# Patient Record
Sex: MALE | Race: BLACK OR AFRICAN AMERICAN | NOT HISPANIC OR LATINO | Employment: FULL TIME | URBAN - METROPOLITAN AREA
[De-identification: names, ages, dates, MRNs, and addresses within clinical notes are randomized per-mention and may not be internally consistent; named-entity substitution may affect disease eponyms.]

---

## 2022-09-20 ENCOUNTER — ANESTHESIA EVENT (OUTPATIENT)
Dept: SURGERY | Facility: HOSPITAL | Age: 32
DRG: 025 | End: 2022-09-20
Payer: COMMERCIAL

## 2022-09-20 ENCOUNTER — ANESTHESIA (OUTPATIENT)
Dept: SURGERY | Facility: HOSPITAL | Age: 32
DRG: 025 | End: 2022-09-20
Payer: COMMERCIAL

## 2022-09-20 ENCOUNTER — HOSPITAL ENCOUNTER (INPATIENT)
Facility: HOSPITAL | Age: 32
LOS: 4 days | Discharge: HOME | DRG: 025 | End: 2022-09-24
Attending: EMERGENCY MEDICINE | Admitting: INTERNAL MEDICINE
Payer: COMMERCIAL

## 2022-09-20 DIAGNOSIS — I61.9 BRAIN BLEED: ICD-10-CM

## 2022-09-20 DIAGNOSIS — G93.89 BRAIN MASS: Primary | ICD-10-CM

## 2022-09-20 DIAGNOSIS — I61.9 INTRAPARENCHYMAL HEMORRHAGE OF BRAIN: ICD-10-CM

## 2022-09-20 LAB
ALBUMIN SERPL-MCNC: 4.4 GM/DL (ref 3.5–5)
ALBUMIN/GLOB SERPL: 1.3 RATIO (ref 1.1–2)
ALP SERPL-CCNC: 49 UNIT/L (ref 40–150)
ALT SERPL-CCNC: 11 UNIT/L (ref 0–55)
APPEARANCE UR: CLEAR
APTT PPP: 29.9 SECONDS (ref 23.2–33.7)
AST SERPL-CCNC: 16 UNIT/L (ref 5–34)
BACTERIA #/AREA URNS AUTO: NORMAL /HPF
BASOPHILS # BLD AUTO: 0.03 X10(3)/MCL (ref 0–0.2)
BASOPHILS NFR BLD AUTO: 0.2 %
BILIRUB UR QL STRIP.AUTO: NEGATIVE MG/DL
BILIRUBIN DIRECT+TOT PNL SERPL-MCNC: 1 MG/DL
BUN SERPL-MCNC: 12.5 MG/DL (ref 8.9–20.6)
CALCIUM SERPL-MCNC: 10.4 MG/DL (ref 8.4–10.2)
CHLORIDE SERPL-SCNC: 107 MMOL/L (ref 98–107)
CO2 SERPL-SCNC: 23 MMOL/L (ref 22–29)
COLOR UR AUTO: YELLOW
CREAT SERPL-MCNC: 0.78 MG/DL (ref 0.73–1.18)
EOSINOPHIL # BLD AUTO: 0 X10(3)/MCL (ref 0–0.9)
EOSINOPHIL NFR BLD AUTO: 0 %
ERYTHROCYTE [DISTWIDTH] IN BLOOD BY AUTOMATED COUNT: 12.9 % (ref 11.5–17)
GFR SERPLBLD CREATININE-BSD FMLA CKD-EPI: >60 MLS/MIN/1.73/M2
GLOBULIN SER-MCNC: 3.4 GM/DL (ref 2.4–3.5)
GLUCOSE SERPL-MCNC: 142 MG/DL (ref 74–100)
GLUCOSE UR QL STRIP.AUTO: NEGATIVE MG/DL
GROUP & RH: NORMAL
HCT VFR BLD AUTO: 42.8 % (ref 42–52)
HGB BLD-MCNC: 13.8 GM/DL (ref 14–18)
IMM GRANULOCYTES # BLD AUTO: 0.07 X10(3)/MCL (ref 0–0.04)
IMM GRANULOCYTES NFR BLD AUTO: 0.5 %
INDIRECT COOMBS GEL: NORMAL
INR BLD: 1.09 (ref 0–1.3)
KETONES UR QL STRIP.AUTO: NEGATIVE MG/DL
LEUKOCYTE ESTERASE UR QL STRIP.AUTO: NEGATIVE UNIT/L
LYMPHOCYTES # BLD AUTO: 0.5 X10(3)/MCL (ref 0.6–4.6)
LYMPHOCYTES NFR BLD AUTO: 3.2 %
MCH RBC QN AUTO: 27.5 PG (ref 27–31)
MCHC RBC AUTO-ENTMCNC: 32.2 MG/DL (ref 33–36)
MCV RBC AUTO: 85.3 FL (ref 80–94)
MONOCYTES # BLD AUTO: 0.25 X10(3)/MCL (ref 0.1–1.3)
MONOCYTES NFR BLD AUTO: 1.6 %
NEUTROPHILS # BLD AUTO: 14.7 X10(3)/MCL (ref 2.1–9.2)
NEUTROPHILS NFR BLD AUTO: 94.5 %
NITRITE UR QL STRIP.AUTO: NEGATIVE
NRBC BLD AUTO-RTO: 0 %
PCO2 BLDA: 35 MMHG
PCO2 BLDA: 35 MMHG
PH SMN: 7.5 [PH] (ref 7.35–7.45)
PH SMN: 7.51 [PH] (ref 7.35–7.45)
PH UR STRIP.AUTO: 7.5 [PH]
PLATELET # BLD AUTO: 409 X10(3)/MCL (ref 130–400)
PMV BLD AUTO: 10.8 FL (ref 7.4–10.4)
PO2 BLDA: 185 MMHG
PO2 BLDA: 205 MMHG
POC BASE DEFICIT: 4.2 MMOL/L
POC BASE DEFICIT: 4.9 MMOL/L
POC HCO3: 27.3 MMOL/L
POC HCO3: 27.9 MMOL/L
POC IONIZED CALCIUM: 1.15 MMOL/L
POC IONIZED CALCIUM: 1.16 MMOL/L
POC SATURATED O2: 100 %
POC SATURATED O2: 100 %
POC TEMPERATURE: 37 C
POC TEMPERATURE: 37 C
POTASSIUM BLD-SCNC: 3.4 MMOL/L
POTASSIUM BLD-SCNC: 3.5 MMOL/L
POTASSIUM SERPL-SCNC: 4.3 MMOL/L (ref 3.5–5.1)
PROT SERPL-MCNC: 7.8 GM/DL (ref 6.4–8.3)
PROT UR QL STRIP.AUTO: NEGATIVE MG/DL
PROTHROMBIN TIME: 14 SECONDS (ref 12.5–14.5)
RBC # BLD AUTO: 5.02 X10(6)/MCL (ref 4.7–6.1)
RBC #/AREA URNS AUTO: <5 /HPF
RBC UR QL AUTO: NEGATIVE UNIT/L
SODIUM BLD-SCNC: 140 MMOL/L
SODIUM BLD-SCNC: 140 MMOL/L
SODIUM SERPL-SCNC: 142 MMOL/L (ref 136–145)
SP GR UR STRIP.AUTO: 1.02 (ref 1–1.03)
SPECIMEN SOURCE: ABNORMAL
SPECIMEN SOURCE: ABNORMAL
SQUAMOUS #/AREA URNS AUTO: <5 /HPF
UROBILINOGEN UR STRIP-ACNC: 1 MG/DL
WBC # SPEC AUTO: 15.5 X10(3)/MCL (ref 4.5–11.5)
WBC #/AREA URNS AUTO: <5 /HPF

## 2022-09-20 PROCEDURE — 37000009 HC ANESTHESIA EA ADD 15 MINS: Performed by: NEUROLOGICAL SURGERY

## 2022-09-20 PROCEDURE — 27000221 HC OXYGEN, UP TO 24 HOURS

## 2022-09-20 PROCEDURE — 96375 TX/PRO/DX INJ NEW DRUG ADDON: CPT

## 2022-09-20 PROCEDURE — 36000713 HC OR TIME LEV V EA ADD 15 MIN: Performed by: NEUROLOGICAL SURGERY

## 2022-09-20 PROCEDURE — 96374 THER/PROPH/DIAG INJ IV PUSH: CPT

## 2022-09-20 PROCEDURE — 25000003 PHARM REV CODE 250: Performed by: NURSE PRACTITIONER

## 2022-09-20 PROCEDURE — 61510 PR EXCIS SUPRATENT BRAIN TUMOR: ICD-10-PCS | Mod: ,,, | Performed by: NEUROLOGICAL SURGERY

## 2022-09-20 PROCEDURE — 25000003 PHARM REV CODE 250

## 2022-09-20 PROCEDURE — 85025 COMPLETE CBC W/AUTO DIFF WBC: CPT | Performed by: EMERGENCY MEDICINE

## 2022-09-20 PROCEDURE — 61510 CRNEC TREPH EXC BRN TUM STTL: CPT | Mod: AS,,, | Performed by: NURSE PRACTITIONER

## 2022-09-20 PROCEDURE — 36415 COLL VENOUS BLD VENIPUNCTURE: CPT | Performed by: NEUROLOGICAL SURGERY

## 2022-09-20 PROCEDURE — 81287 MGMT GENE PRMTR MTHYLTN ALYS: CPT

## 2022-09-20 PROCEDURE — 86901 BLOOD TYPING SEROLOGIC RH(D): CPT | Performed by: NEUROLOGICAL SURGERY

## 2022-09-20 PROCEDURE — 63600175 PHARM REV CODE 636 W HCPCS: Performed by: NEUROLOGICAL SURGERY

## 2022-09-20 PROCEDURE — C9248 INJ, CLEVIDIPINE BUTYRATE: HCPCS | Mod: JG

## 2022-09-20 PROCEDURE — 63600175 PHARM REV CODE 636 W HCPCS: Mod: JG

## 2022-09-20 PROCEDURE — 94761 N-INVAS EAR/PLS OXIMETRY MLT: CPT

## 2022-09-20 PROCEDURE — 82803 BLOOD GASES ANY COMBINATION: CPT

## 2022-09-20 PROCEDURE — 81001 URINALYSIS AUTO W/SCOPE: CPT | Performed by: EMERGENCY MEDICINE

## 2022-09-20 PROCEDURE — 37000008 HC ANESTHESIA 1ST 15 MINUTES: Performed by: NEUROLOGICAL SURGERY

## 2022-09-20 PROCEDURE — 80053 COMPREHEN METABOLIC PANEL: CPT | Performed by: EMERGENCY MEDICINE

## 2022-09-20 PROCEDURE — 36415 COLL VENOUS BLD VENIPUNCTURE: CPT | Performed by: EMERGENCY MEDICINE

## 2022-09-20 PROCEDURE — 25000003 PHARM REV CODE 250: Performed by: INTERNAL MEDICINE

## 2022-09-20 PROCEDURE — 85730 THROMBOPLASTIN TIME PARTIAL: CPT | Performed by: EMERGENCY MEDICINE

## 2022-09-20 PROCEDURE — 27201423 OPTIME MED/SURG SUP & DEVICES STERILE SUPPLY: Performed by: NEUROLOGICAL SURGERY

## 2022-09-20 PROCEDURE — 27200966 HC CLOSED SUCTION SYSTEM

## 2022-09-20 PROCEDURE — 63600175 PHARM REV CODE 636 W HCPCS: Performed by: EMERGENCY MEDICINE

## 2022-09-20 PROCEDURE — 69990 PR MICROSURG TECHNIQUES,REQ OPER MICROSCOPE: ICD-10-PCS | Mod: 59,,, | Performed by: NEUROLOGICAL SURGERY

## 2022-09-20 PROCEDURE — 85610 PROTHROMBIN TIME: CPT | Performed by: EMERGENCY MEDICINE

## 2022-09-20 PROCEDURE — 37799 UNLISTED PX VASCULAR SURGERY: CPT

## 2022-09-20 PROCEDURE — 99900031 HC PATIENT EDUCATION (STAT)

## 2022-09-20 PROCEDURE — 81455 SO/HL 51/>GSAP DNA/DNA&RNA: CPT

## 2022-09-20 PROCEDURE — 99900035 HC TECH TIME PER 15 MIN (STAT)

## 2022-09-20 PROCEDURE — C1729 CATH, DRAINAGE: HCPCS | Performed by: NEUROLOGICAL SURGERY

## 2022-09-20 PROCEDURE — 61510 PR EXCIS SUPRATENT BRAIN TUMOR: ICD-10-PCS | Mod: AS,,, | Performed by: NURSE PRACTITIONER

## 2022-09-20 PROCEDURE — 36000712 HC OR TIME LEV V 1ST 15 MIN: Performed by: NEUROLOGICAL SURGERY

## 2022-09-20 PROCEDURE — 25000003 PHARM REV CODE 250: Performed by: NEUROLOGICAL SURGERY

## 2022-09-20 PROCEDURE — 88381 MICRODISSECTION MANUAL: CPT

## 2022-09-20 PROCEDURE — 61781 PR STEREOTACTIC COMP ASSIST PROC,CRANIAL,INTRADURAL: ICD-10-PCS | Mod: ,,, | Performed by: NEUROLOGICAL SURGERY

## 2022-09-20 PROCEDURE — 63600175 PHARM REV CODE 636 W HCPCS: Performed by: STUDENT IN AN ORGANIZED HEALTH CARE EDUCATION/TRAINING PROGRAM

## 2022-09-20 PROCEDURE — 20000000 HC ICU ROOM

## 2022-09-20 PROCEDURE — 61781 SCAN PROC CRANIAL INTRA: CPT | Mod: ,,, | Performed by: NEUROLOGICAL SURGERY

## 2022-09-20 PROCEDURE — 63600175 PHARM REV CODE 636 W HCPCS: Performed by: INTERNAL MEDICINE

## 2022-09-20 PROCEDURE — 96365 THER/PROPH/DIAG IV INF INIT: CPT

## 2022-09-20 PROCEDURE — 25000003 PHARM REV CODE 250: Performed by: STUDENT IN AN ORGANIZED HEALTH CARE EDUCATION/TRAINING PROGRAM

## 2022-09-20 PROCEDURE — C1713 ANCHOR/SCREW BN/BN,TIS/BN: HCPCS | Performed by: NEUROLOGICAL SURGERY

## 2022-09-20 PROCEDURE — 94002 VENT MGMT INPAT INIT DAY: CPT

## 2022-09-20 PROCEDURE — 99233 PR SUBSEQUENT HOSPITAL CARE,LEVL III: ICD-10-PCS | Mod: FS,57,, | Performed by: NEUROLOGICAL SURGERY

## 2022-09-20 PROCEDURE — 61510 CRNEC TREPH EXC BRN TUM STTL: CPT | Mod: ,,, | Performed by: NEUROLOGICAL SURGERY

## 2022-09-20 PROCEDURE — 99233 SBSQ HOSP IP/OBS HIGH 50: CPT | Mod: FS,57,, | Performed by: NEUROLOGICAL SURGERY

## 2022-09-20 PROCEDURE — 69990 MICROSURGERY ADD-ON: CPT | Mod: 59,,, | Performed by: NEUROLOGICAL SURGERY

## 2022-09-20 PROCEDURE — 63600175 PHARM REV CODE 636 W HCPCS: Performed by: NURSE PRACTITIONER

## 2022-09-20 PROCEDURE — 99291 CRITICAL CARE FIRST HOUR: CPT | Mod: 25

## 2022-09-20 DEVICE — COVER UN3 BURRHOLE 14MM TAB: Type: IMPLANTABLE DEVICE | Site: SCALP | Status: FUNCTIONAL

## 2022-09-20 DEVICE — SCREW SD 1.5X4MM TI CROSS PIN: Type: IMPLANTABLE DEVICE | Site: SCALP | Status: FUNCTIONAL

## 2022-09-20 RX ORDER — FUROSEMIDE 10 MG/ML
40 INJECTION INTRAMUSCULAR; INTRAVENOUS
Status: COMPLETED | OUTPATIENT
Start: 2022-09-20 | End: 2022-09-20

## 2022-09-20 RX ORDER — SUCCINYLCHOLINE CHLORIDE 20 MG/ML
INJECTION INTRAMUSCULAR; INTRAVENOUS
Status: DISCONTINUED | OUTPATIENT
Start: 2022-09-20 | End: 2022-09-20

## 2022-09-20 RX ORDER — ATROPINE SULFATE 0.1 MG/ML
INJECTION INTRAVENOUS
Status: DISPENSED
Start: 2022-09-20 | End: 2022-09-21

## 2022-09-20 RX ORDER — HYDROCODONE BITARTRATE AND ACETAMINOPHEN 5; 325 MG/1; MG/1
1 TABLET ORAL EVERY 4 HOURS PRN
Status: DISCONTINUED | OUTPATIENT
Start: 2022-09-20 | End: 2022-09-24 | Stop reason: HOSPADM

## 2022-09-20 RX ORDER — HYDRALAZINE HYDROCHLORIDE 20 MG/ML
10 INJECTION INTRAMUSCULAR; INTRAVENOUS EVERY 4 HOURS PRN
Status: DISCONTINUED | OUTPATIENT
Start: 2022-09-20 | End: 2022-09-20

## 2022-09-20 RX ORDER — MIDAZOLAM HYDROCHLORIDE 1 MG/ML
INJECTION INTRAMUSCULAR; INTRAVENOUS
Status: DISCONTINUED
Start: 2022-09-20 | End: 2022-09-20 | Stop reason: WASHOUT

## 2022-09-20 RX ORDER — LEVETIRACETAM 500 MG/5ML
INJECTION, SOLUTION, CONCENTRATE INTRAVENOUS
Status: DISCONTINUED | OUTPATIENT
Start: 2022-09-20 | End: 2022-09-20

## 2022-09-20 RX ORDER — PROPOFOL 10 MG/ML
VIAL (ML) INTRAVENOUS
Status: DISCONTINUED | OUTPATIENT
Start: 2022-09-20 | End: 2022-09-20

## 2022-09-20 RX ORDER — HYDRALAZINE HYDROCHLORIDE 20 MG/ML
10 INJECTION INTRAMUSCULAR; INTRAVENOUS EVERY 6 HOURS PRN
Status: DISCONTINUED | OUTPATIENT
Start: 2022-09-20 | End: 2022-09-24 | Stop reason: HOSPADM

## 2022-09-20 RX ORDER — FENTANYL CITRATE 50 UG/ML
INJECTION, SOLUTION INTRAMUSCULAR; INTRAVENOUS
Status: DISCONTINUED | OUTPATIENT
Start: 2022-09-20 | End: 2022-09-20

## 2022-09-20 RX ORDER — DEXMEDETOMIDINE HYDROCHLORIDE 4 UG/ML
0-1.4 INJECTION, SOLUTION INTRAVENOUS CONTINUOUS
Status: DISCONTINUED | OUTPATIENT
Start: 2022-09-20 | End: 2022-09-24 | Stop reason: HOSPADM

## 2022-09-20 RX ORDER — GLYCOPYRROLATE 0.2 MG/ML
INJECTION INTRAMUSCULAR; INTRAVENOUS
Status: DISCONTINUED | OUTPATIENT
Start: 2022-09-20 | End: 2022-09-20

## 2022-09-20 RX ORDER — PAPAVERINE HYDROCHLORIDE 30 MG/ML
INJECTION INTRAMUSCULAR; INTRAVENOUS
Status: DISCONTINUED | OUTPATIENT
Start: 2022-09-20 | End: 2022-09-20 | Stop reason: HOSPADM

## 2022-09-20 RX ORDER — BACITRACIN ZINC 500 [USP'U]/G
OINTMENT TOPICAL
Status: DISCONTINUED | OUTPATIENT
Start: 2022-09-20 | End: 2022-09-20 | Stop reason: HOSPADM

## 2022-09-20 RX ORDER — ROCURONIUM BROMIDE 10 MG/ML
INJECTION, SOLUTION INTRAVENOUS
Status: DISCONTINUED | OUTPATIENT
Start: 2022-09-20 | End: 2022-09-20

## 2022-09-20 RX ORDER — DEXAMETHASONE SODIUM PHOSPHATE 4 MG/ML
INJECTION, SOLUTION INTRA-ARTICULAR; INTRALESIONAL; INTRAMUSCULAR; INTRAVENOUS; SOFT TISSUE
Status: DISCONTINUED | OUTPATIENT
Start: 2022-09-20 | End: 2022-09-20

## 2022-09-20 RX ORDER — MORPHINE SULFATE 4 MG/ML
INJECTION, SOLUTION INTRAMUSCULAR; INTRAVENOUS
Status: DISCONTINUED
Start: 2022-09-20 | End: 2022-09-20 | Stop reason: WASHOUT

## 2022-09-20 RX ORDER — SODIUM CHLORIDE 9 MG/ML
INJECTION, SOLUTION INTRAVENOUS CONTINUOUS
Status: DISCONTINUED | OUTPATIENT
Start: 2022-09-20 | End: 2022-09-24 | Stop reason: HOSPADM

## 2022-09-20 RX ORDER — ONDANSETRON 2 MG/ML
4 INJECTION INTRAMUSCULAR; INTRAVENOUS EVERY 4 HOURS PRN
Status: DISCONTINUED | OUTPATIENT
Start: 2022-09-20 | End: 2022-09-24 | Stop reason: HOSPADM

## 2022-09-20 RX ORDER — HYDRALAZINE HYDROCHLORIDE 20 MG/ML
10 INJECTION INTRAMUSCULAR; INTRAVENOUS
Status: COMPLETED | OUTPATIENT
Start: 2022-09-20 | End: 2022-09-20

## 2022-09-20 RX ORDER — MANNITOL 250 MG/ML
50 INJECTION, SOLUTION INTRAVENOUS
Status: COMPLETED | OUTPATIENT
Start: 2022-09-20 | End: 2022-09-20

## 2022-09-20 RX ORDER — MANNITOL 250 MG/ML
INJECTION, SOLUTION INTRAVENOUS
Status: DISPENSED
Start: 2022-09-20 | End: 2022-09-20

## 2022-09-20 RX ORDER — CEFAZOLIN SODIUM 1 G/3ML
INJECTION, POWDER, FOR SOLUTION INTRAMUSCULAR; INTRAVENOUS
Status: DISCONTINUED | OUTPATIENT
Start: 2022-09-20 | End: 2022-09-20 | Stop reason: HOSPADM

## 2022-09-20 RX ORDER — ACETAMINOPHEN 325 MG/1
650 TABLET ORAL EVERY 4 HOURS PRN
Status: DISCONTINUED | OUTPATIENT
Start: 2022-09-20 | End: 2022-09-24 | Stop reason: HOSPADM

## 2022-09-20 RX ORDER — FENTANYL CITRATE 50 UG/ML
50 INJECTION, SOLUTION INTRAMUSCULAR; INTRAVENOUS
Status: DISCONTINUED | OUTPATIENT
Start: 2022-09-20 | End: 2022-09-22

## 2022-09-20 RX ORDER — LABETALOL HYDROCHLORIDE 5 MG/ML
10 INJECTION, SOLUTION INTRAVENOUS EVERY 4 HOURS PRN
Status: DISCONTINUED | OUTPATIENT
Start: 2022-09-20 | End: 2022-09-24 | Stop reason: HOSPADM

## 2022-09-20 RX ORDER — BUPIVACAINE HYDROCHLORIDE AND EPINEPHRINE 5; 5 MG/ML; UG/ML
INJECTION, SOLUTION EPIDURAL; INTRACAUDAL; PERINEURAL
Status: DISCONTINUED | OUTPATIENT
Start: 2022-09-20 | End: 2022-09-20 | Stop reason: HOSPADM

## 2022-09-20 RX ORDER — LIDOCAINE HYDROCHLORIDE AND EPINEPHRINE 5; 5 MG/ML; UG/ML
INJECTION, SOLUTION INFILTRATION; PERINEURAL
Status: DISCONTINUED | OUTPATIENT
Start: 2022-09-20 | End: 2022-09-20 | Stop reason: HOSPADM

## 2022-09-20 RX ORDER — ONDANSETRON 2 MG/ML
INJECTION INTRAMUSCULAR; INTRAVENOUS
Status: DISCONTINUED | OUTPATIENT
Start: 2022-09-20 | End: 2022-09-20

## 2022-09-20 RX ORDER — LEVETIRACETAM 500 MG/5ML
500 INJECTION, SOLUTION, CONCENTRATE INTRAVENOUS EVERY 12 HOURS
Status: DISCONTINUED | OUTPATIENT
Start: 2022-09-20 | End: 2022-09-20

## 2022-09-20 RX ORDER — ACETAMINOPHEN 10 MG/ML
INJECTION, SOLUTION INTRAVENOUS
Status: DISCONTINUED | OUTPATIENT
Start: 2022-09-20 | End: 2022-09-20

## 2022-09-20 RX ORDER — PHENYLEPHRINE HYDROCHLORIDE 10 MG/ML
INJECTION INTRAVENOUS
Status: DISCONTINUED | OUTPATIENT
Start: 2022-09-20 | End: 2022-09-20

## 2022-09-20 RX ORDER — LEVETIRACETAM 500 MG/1
500 TABLET ORAL EVERY 12 HOURS
Status: DISCONTINUED | OUTPATIENT
Start: 2022-09-20 | End: 2022-09-20

## 2022-09-20 RX ORDER — DEXAMETHASONE SODIUM PHOSPHATE 4 MG/ML
4 INJECTION, SOLUTION INTRA-ARTICULAR; INTRALESIONAL; INTRAMUSCULAR; INTRAVENOUS; SOFT TISSUE EVERY 6 HOURS
Status: DISCONTINUED | OUTPATIENT
Start: 2022-09-20 | End: 2022-09-22

## 2022-09-20 RX ORDER — DEXMEDETOMIDINE HYDROCHLORIDE 4 UG/ML
INJECTION, SOLUTION INTRAVENOUS
Status: COMPLETED
Start: 2022-09-20 | End: 2022-09-20

## 2022-09-20 RX ORDER — CEFAZOLIN SODIUM IN 0.9 % NACL 2 G/100 ML
PLASTIC BAG, INJECTION (ML) INTRAVENOUS
Status: DISCONTINUED | OUTPATIENT
Start: 2022-09-20 | End: 2022-09-20

## 2022-09-20 RX ADMIN — MANNITOL 50 G: 12.5 INJECTION, SOLUTION INTRAVENOUS at 06:09

## 2022-09-20 RX ADMIN — PHENYLEPHRINE HYDROCHLORIDE 0.1 MCG/KG/MIN: 10 INJECTION INTRAVENOUS at 09:09

## 2022-09-20 RX ADMIN — PHENYLEPHRINE HYDROCHLORIDE 100 MCG: 10 INJECTION INTRAVENOUS at 09:09

## 2022-09-20 RX ADMIN — PHENYLEPHRINE HYDROCHLORIDE 100 MCG: 10 INJECTION INTRAVENOUS at 08:09

## 2022-09-20 RX ADMIN — DEXAMETHASONE SODIUM PHOSPHATE 10 MG: 4 INJECTION, SOLUTION INTRA-ARTICULAR; INTRALESIONAL; INTRAMUSCULAR; INTRAVENOUS; SOFT TISSUE at 08:09

## 2022-09-20 RX ADMIN — FUROSEMIDE 40 MG: 10 INJECTION, SOLUTION INTRAMUSCULAR; INTRAVENOUS at 07:09

## 2022-09-20 RX ADMIN — PROPOFOL 160 MG: 10 INJECTION, EMULSION INTRAVENOUS at 07:09

## 2022-09-20 RX ADMIN — LEVETIRACETAM 500 MG: 100 INJECTION, SOLUTION INTRAVENOUS at 08:09

## 2022-09-20 RX ADMIN — ROCURONIUM BROMIDE 30 MG: 10 INJECTION, SOLUTION INTRAVENOUS at 08:09

## 2022-09-20 RX ADMIN — PROPOFOL 50 MG: 10 INJECTION, EMULSION INTRAVENOUS at 08:09

## 2022-09-20 RX ADMIN — HYDRALAZINE HYDROCHLORIDE 10 MG: 20 INJECTION INTRAMUSCULAR; INTRAVENOUS at 06:09

## 2022-09-20 RX ADMIN — ROCURONIUM BROMIDE 45 MG: 10 INJECTION, SOLUTION INTRAVENOUS at 07:09

## 2022-09-20 RX ADMIN — ONDANSETRON 4 MG: 2 INJECTION INTRAMUSCULAR; INTRAVENOUS at 10:09

## 2022-09-20 RX ADMIN — Medication 2 G: at 08:09

## 2022-09-20 RX ADMIN — SUCCINYLCHOLINE CHLORIDE 170 MG: 20 INJECTION, SOLUTION INTRAMUSCULAR; INTRAVENOUS at 07:09

## 2022-09-20 RX ADMIN — DEXMEDETOMIDINE HYDROCHLORIDE 0.2 MCG/KG/HR: 400 INJECTION INTRAVENOUS at 01:09

## 2022-09-20 RX ADMIN — ROCURONIUM BROMIDE 20 MG: 10 INJECTION, SOLUTION INTRAVENOUS at 09:09

## 2022-09-20 RX ADMIN — PHENYLEPHRINE HYDROCHLORIDE 200 MCG: 10 INJECTION INTRAVENOUS at 08:09

## 2022-09-20 RX ADMIN — PROPOFOL 100 MG: 10 INJECTION, EMULSION INTRAVENOUS at 08:09

## 2022-09-20 RX ADMIN — ACETAMINOPHEN 1000 MG: 10 INJECTION, SOLUTION INTRAVENOUS at 11:09

## 2022-09-20 RX ADMIN — GLYCOPYRROLATE 0.2 MG: 0.2 INJECTION INTRAMUSCULAR; INTRAVENOUS at 08:09

## 2022-09-20 RX ADMIN — DEXTROSE MONOHYDRATE 1 G: 5 INJECTION INTRAVENOUS at 12:09

## 2022-09-20 RX ADMIN — DEXAMETHASONE SODIUM PHOSPHATE 4 MG: 4 INJECTION, SOLUTION INTRA-ARTICULAR; INTRALESIONAL; INTRAMUSCULAR; INTRAVENOUS; SOFT TISSUE at 06:09

## 2022-09-20 RX ADMIN — CLEVIPIDINE 4 MG/HR: 0.5 EMULSION INTRAVENOUS at 06:09

## 2022-09-20 RX ADMIN — FENTANYL CITRATE 100 MCG: 50 INJECTION, SOLUTION INTRAMUSCULAR; INTRAVENOUS at 08:09

## 2022-09-20 RX ADMIN — LABETALOL HYDROCHLORIDE 10 MG: 5 INJECTION, SOLUTION INTRAVENOUS at 12:09

## 2022-09-20 RX ADMIN — FENTANYL CITRATE 100 MCG: 50 INJECTION, SOLUTION INTRAMUSCULAR; INTRAVENOUS at 07:09

## 2022-09-20 RX ADMIN — CLEVIPIDINE 25 MG: 0.5 EMULSION INTRAVENOUS at 06:09

## 2022-09-20 RX ADMIN — DEXAMETHASONE SODIUM PHOSPHATE 4 MG: 4 INJECTION, SOLUTION INTRA-ARTICULAR; INTRALESIONAL; INTRAMUSCULAR; INTRAVENOUS; SOFT TISSUE at 12:09

## 2022-09-20 RX ADMIN — DEXTROSE MONOHYDRATE 1 G: 5 INJECTION INTRAVENOUS at 08:09

## 2022-09-20 RX ADMIN — DEXMEDETOMIDINE HYDROCHLORIDE 0.2 MCG: 400 INJECTION INTRAVENOUS at 01:09

## 2022-09-20 RX ADMIN — ROCURONIUM BROMIDE 5 MG: 10 INJECTION, SOLUTION INTRAVENOUS at 07:09

## 2022-09-20 RX ADMIN — LEVETIRACETAM 500 MG: 100 INJECTION, SOLUTION INTRAVENOUS at 10:09

## 2022-09-20 RX ADMIN — PROPOFOL 40 MG: 10 INJECTION, EMULSION INTRAVENOUS at 10:09

## 2022-09-20 RX ADMIN — SODIUM CHLORIDE: 9 INJECTION, SOLUTION INTRAVENOUS at 07:09

## 2022-09-20 RX ADMIN — CLEVIPIDINE 25 MG: 0.5 EMULSION INTRAVENOUS at 12:09

## 2022-09-20 RX ADMIN — SODIUM CHLORIDE: 9 INJECTION, SOLUTION INTRAVENOUS at 12:09

## 2022-09-20 NOTE — BRIEF OP NOTE
[facility] - 7th Floor ICU  Brief Operative Note    SUMMARY     Surgery Date: 9/20/2022     Surgeon(s) and Role:     * Duglas Fowler MD - Primary    Assistant: Cecily Duffy NP    Pre-op Diagnosis:  Hemorrhagic brain mass    Post-op Diagnosis:  Post-Op Diagnosis Codes:     * Brain bleed [I61.9]  Hemorrhagic brain mass    Procedure(s) (LRB):  CRANIOTOMY, WITH NEOPLASM EXCISION USING COMPUTER-ASSISTED NAVIGATION (Right)  VENTRICULOSTOMY (Left)  -right craniotomy, partial frontal lobectomy and excision of hemorrhagic mass  -stealth    Anesthesia: General    Operative Findings: Patient tolerated procedure well and was transferred to PACU.     Estimated Blood Loss: 400ml    Estimated Blood Loss has been documented.         Specimens:   Specimen (24h ago, onward)       Start     Ordered    09/20/22 0929  Specimen to Pathology  RELEASE UPON ORDERING        References:    Click here for ordering Quick Tip   Question:  Release to patient  Answer:  Immediate    09/20/22 0924                    [id number]

## 2022-09-20 NOTE — H&P
[facility] - 7th Floor ICU  Pulmonary Critical Care Note    Patient Name: Michael Anthony  MRN: [id number]  Admission Date: 9/20/2022  Hospital Length of Stay: 0 days  Code Status: No Order  Attending Provider: Byron Maki MD  Primary Care Provider: No primary care provider on file.     Subjective:     HPI:   32-year-old male transfer from [facility] for ICH with possible mass.   Patient presented to ED with complaints of headaches.  He decompensated en route to MRI and was taken to OR. Craniotomy with excision performed by Dr. Fowler. Patient intubated, then transferred to ICU for q1h neuro checks and blood pressure monitoring.    No personal or family history of migraines. No previous TBI.      History reviewed. No pertinent past medical history.    Social History     Socioeconomic History    Marital status: Unknown           Objective:   No current outpatient medications    Current Inpatient Medications   ceFAZolin (ANCEF) IVPB  1 g Intravenous Q8H    dexamethasone  4 mg Intravenous Q6H    levETIRAcetam  500 mg Oral Q12H    mannitol 25%               Intake/Output Summary (Last 24 hours) at 9/20/2022 1327  Last data filed at 9/20/2022 1314  Gross per 24 hour   Intake 1000 ml   Output 2700 ml   Net -1700 ml       Review of Systems   Unable to perform ROS: Intubated      Vital Signs (Most Recent):  Temp: 97.5 °F (36.4 °C) (09/20/22 1230)  Pulse: 90 (09/20/22 1315)  Resp: 20 (09/20/22 1315)  BP: 121/72 (09/20/22 1300)  SpO2: 100 % (09/20/22 1315)  Body mass index is 19.43 kg/m².  Weight: 63.2 kg (139 lb 5.3 oz) Vital Signs (24h Range):  Temp:  [97.5 °F (36.4 °C)] 97.5 °F (36.4 °C)  Pulse:  [] 90  Resp:  [20-32] 20  SpO2:  [80 %-100 %] 100 %  BP: (116-169)/() 121/72  Arterial Line BP: (123-176)/() 138/58     Physical Exam  Constitutional:       Interventions: He is intubated.   Eyes:      Pupils: Pupils are equal, round, and reactive to light.      Comments: Corneal  reflex normal.     Cardiovascular:      Rate and Rhythm: Normal rate and regular rhythm.      Pulses:           Radial pulses are 2+ on the right side and 2+ on the left side.        Dorsalis pedis pulses are 2+ on the right side and 2+ on the left side.        Posterior tibial pulses are 2+ on the right side and 2+ on the left side.      Heart sounds: Normal heart sounds.   Pulmonary:      Effort: Pulmonary effort is normal. He is intubated.      Breath sounds: Normal breath sounds.   Abdominal:      General: Abdomen is flat. Bowel sounds are normal.      Palpations: Abdomen is soft.   Musculoskeletal:      Right lower leg: No edema.      Left lower leg: No edema.   Neurological:      Sensory: Sensation is intact.      Comments: Patient intubated. Unable to assess strength or gait.         Mechanical ventilation support:  Vent Mode: SIMV (09/20/22 1230)  Ventilator Initiated: Yes (09/20/22 1150)  Set Rate: 12 BPM (09/20/22 1230)  Vt Set: 500 mL (09/20/22 1230)  PEEP/CPAP: 5 cmH20 (09/20/22 1230)  Oxygen Concentration (%): 100 (09/20/22 1150)  Peak Airway Pressure: 16 cmH2O (09/20/22 1150)  Total Ve: 11.6 mL (09/20/22 1150)  F/VT Ratio<105 (RSBI): (!) 36.56 (09/20/22 1150)    Lines/Drains/Airways       Drain  Duration                  Urethral Catheter 09/20/22 0710 Silicone 16 Fr. <1 day              Airway  Duration                  Airway - Non-Surgical 09/20/22 0749 <1 day              Arterial Line  Duration             Arterial Line 09/20/22 0756 Left Radial <1 day              Peripheral Intravenous Line  Duration                  Peripheral IV - Single Lumen 09/20/22 0649 18 G Anterior;Left;Proximal Forearm <1 day         Peripheral IV - Single Lumen 09/20/22 0709 20 G Anterior;Right Forearm <1 day                    Significant Labs:    Lab Results   Component Value Date    WBC 15.5 (H) 09/20/2022    HGB 13.8 (L) 09/20/2022    HCT 42.8 09/20/2022    MCV 85.3 09/20/2022     (H) 09/20/2022          BMP  Lab Results   Component Value Date     09/20/2022    K 4.3 09/20/2022    CO2 23 09/20/2022    BUN 12.5 09/20/2022    CREATININE 0.78 09/20/2022    CALCIUM 10.4 (H) 09/20/2022       ABG  No results for input(s): PH, PO2, PCO2, HCO3, BE in the last 168 hours.        Significant Imaging:  CT HEAD STEALTH W/O CONTRAST     CLINICAL HISTORY:  ich with mass;     TECHNIQUE:  Stealth protocol axial scans were obtained from skull base to the vertex with contrast.     Coronal and sagittal reconstructions obtained from the axial data.     Automatic exposure control was utilized to limit radiation dose.     Radiation Dose:     Total DLP: 1061 mGy*cm     COMPARISON:  Outside head CT dated 09/20/2022    My read:  Large right frontal lobe mass with surrounding edema.  Leftward midline shift with ventricular obliteration.      Assessment/Plan:     Assessment  Hemorrhagic Brain Mass s/p craniotomy and excision      Plan  Continue vent management. Wean as tolerated.  Maintain systolic blood pressure <150, per surgery recommendations. Continue Cleviprex.  Q1h neuro checks.  Precedex for cough suppression.    DVT Prophylaxis:  GI Prophylaxis:     Greater than 30 minutes of critical care was time spent personally by me on the following activities: development of treatment plan with patient or surrogate and bedside caregivers, discussions with consultants, evaluation of patient's response to treatment, examination of patient, ordering and performing treatments and interventions, ordering and review of laboratory studies, ordering and review of radiographic studies, pulse oximetry, re-evaluation of patient's condition.  This critical care time did not overlap with that of any other provider or involve time for any procedures.     David Lucas MD  Pulmonary Critical Care Medicine  [facility] - 7th Floor ICU

## 2022-09-20 NOTE — ANESTHESIA PREPROCEDURE EVALUATION
"                                                                                                             09/20/2022  Michael Anthony is a 32 y.o., male.    "32-year-old male transfer from [facility] for ICH with possible mass.  The initial evaluating EDs note is not yet available and has not yet been sent.  Nor has the CT report.  The outside image was loaded on a disc it was performed at 4:40 a.m. this morning.  It was sent with the patient.  Patient states headache has not been chronic may be a few months with some nausea.  His head hurts now it hurts behind the right eye.  Denies a history of migraines.  He does not take any regular medications.  His father arrived shortly after the patient states that he has not told him about any headaches prior to last night.  He says his son has no known medical problems and takes no medications."    Pre-op Assessment    I have reviewed the Patient Summary Reports.     I have reviewed the Nursing Notes. I have reviewed the NPO Status.   I have reviewed the Medications.     Review of Systems  Anesthesia Hx:   Denies Personal Hx of Anesthesia complications.   Cardiovascular:  Cardiovascular Normal     Pulmonary:  Pulmonary Normal    Renal/:  Renal/ Normal     Hepatic/GI:  Hepatic/GI Normal    Neurological:   CVA intraparenchymal hemorrhage       Physical Exam  General: Unconscious        Anesthesia Plan  Type of Anesthesia, risks & benefits discussed:    Anesthesia Type: Gen ETT  Intra-op Monitoring Plan: Standard ASA Monitors and Art Line  Post Op Pain Control Plan: multimodal analgesia  Induction:  IV  Informed Consent: Informed consent signed with the Patient and all parties understand the risks and agree with anesthesia plan.  All questions answered.   ASA Score: 5 Emergent  Day of Surgery Review of History & Physical: H&P Update referred to the surgeon/provider.  Anesthesia Plan Notes:   Emergent case for acute intraparenchymal hemorrhage of brain   Pt " has received mannitol, keppra, and decadron  Discussed patient's history with father  No significant medical hx; no drug allergies  13.8/42/409k  Other labs pending; type and screen sent   K 4.3    Radha ALBRIGHT       Ready For Surgery From Anesthesia Perspective.     .

## 2022-09-20 NOTE — NURSING
Nurses Note -- 4 Eyes      9/20/2022   2:10 PM      Skin assessed during: Admit      [x] No Pressure Injuries Present    []Prevention Measures Documented      [] Yes- Altered Skin Integrity Present or Discovered   [] LDA Added if Not in Epic (Describe Wound)   [] New Altered Skin Integrity was Present on Admit and Documented in LDA   [] Wound Image Taken    Wound Care Consulted? No    Attending Nurse:  Yeny Alvarado RN     Second RN/Staff Member:  YANDY Lieberman

## 2022-09-20 NOTE — ANESTHESIA POSTPROCEDURE EVALUATION
Anesthesia Post Evaluation    Patient: Michael Anthony    Procedure(s) Performed: Procedure(s) (LRB):  CRANIOTOMY, WITH NEOPLASM EXCISION USING COMPUTER-ASSISTED NAVIGATION (Right)  VENTRICULOSTOMY (Left)    Final Anesthesia Type: general      Patient location during evaluation: ICU  Patient participation: No - Unable to Participate, Sedation  Level of consciousness: sedated  Post-procedure vital signs: reviewed and stable  Pain management: adequate  Airway patency: patent    PONV status at discharge: No PONV  Anesthetic complications: no      Cardiovascular status: hemodynamically stable  Respiratory status: ETT  Hydration status: euvolemic  Follow-up not needed.          Vitals Value Taken Time   /94 09/20/22 1215   Temp 36.4 °C (97.5 °F) 09/20/22 1230   Pulse 88 09/20/22 1300   Resp 27 09/20/22 1300   SpO2 100 % 09/20/22 1300   Vitals shown include unvalidated device data.      No case tracking events are documented in the log.      Pain/Doyle Score: No data recorded

## 2022-09-20 NOTE — ANESTHESIA PROCEDURE NOTES
Intubation    Date/Time: 9/20/2022 7:49 AM  Performed by: Aguila Rosario CRNA  Authorized by: Demarcus Garcia MD     Intubation:     Induction:  Intravenous    Intubated:  Postinduction    Mask Ventilation:  Easy mask    Attempts:  1    Attempted By:  CRNA    Method of Intubation:  Video laryngoscopy    Blade:  Pineda 3    Laryngeal View Grade: Grade I - full view of cords      Difficult Airway Encountered?: No      Complications:  None    Airway Device:  Oral endotracheal tube    Airway Device Size:  7.5    Style/Cuff Inflation:  Cuffed (inflated to minimal occlusive pressure)    Tube secured:  22    Secured at:  The lips    Placement Verified By:  Capnometry    Complicating Factors:  None    Findings Post-Intubation:  BS equal bilateral and atraumatic/condition of teeth unchanged

## 2022-09-20 NOTE — ED PROVIDER NOTES
Encounter Date: 9/20/2022       History     Chief Complaint   Patient presents with    transfer     [facility] [place] d/t R parietal IPH. with severe right sided HA and N/V. Gcs 14     32-year-old male transfer from [facility] for ICH with possible mass.  The initial evaluating EDs note is not yet available and has not yet been sent.  Nor has the CT report.  The outside image was loaded on a disc it was performed at 4:40 a.m. this morning.  It was sent with the patient.  Patient states headache has not been chronic may be a few months with some nausea.  His head hurts now it hurts behind the right eye.  Denies a history of migraines.  He does not take any regular medications.  His father arrived shortly after the patient states that he has not told him about any headaches prior to last night.  He says his son has no known medical problems and takes no medications      Headache   This is a new problem. The current episode started yesterday. The problem occurs constantly. The problem has been unchanged. Associated symptoms include nausea and vomiting. Pertinent negatives include no abdominal pain, coughing, fever or neck pain.   Review of patient's allergies indicates:  No Known Allergies  No past medical history on file.  No past surgical history on file.  No family history on file.     Review of Systems   Constitutional:  Negative for fever.   Respiratory:  Negative for cough, chest tightness and shortness of breath.    Cardiovascular:  Negative for chest pain.   Gastrointestinal:  Positive for nausea and vomiting. Negative for abdominal pain.   Musculoskeletal:  Negative for myalgias and neck pain.   Neurological:  Positive for headaches.   All other systems reviewed and are negative.    Physical Exam     Initial Vitals [09/20/22 0539]   BP Pulse Resp Temp SpO2   (!) 147/82 84 (!) 24 -- 97 %      MAP       --         Physical Exam    Nursing note and vitals reviewed.  Constitutional:  He appears well-developed and well-nourished. He appears distressed.   HENT:   Head: Atraumatic.   Eyes: Conjunctivae are normal.   Left pupil is reactive right pupil is sluggish.  Extraocular movements are intact except he is unable to gaze to the right and does have a nystagmus with a fast which to the left when attempting a rightward gaze   Neck: Neck supple.   Cardiovascular:  Normal rate.           Pulmonary/Chest: No respiratory distress. He has no wheezes. He has no rhonchi. He exhibits no tenderness.   Abdominal: Abdomen is soft. He exhibits no distension. There is no abdominal tenderness. There is no rebound and no guarding.   Musculoskeletal:         General: Normal range of motion.      Cervical back: Neck supple.     Neurological: He has normal strength.   Patient is lethargic but answers questions and follows commands occasionally requires some redirection.  He has ataxia in the bilateral upper extremities.  There is no hyperreflexia in the upper extremities.  He elevates each leg independently Face smiles symmetrically eye exam is documented above   Skin: Skin is warm and dry.       ED Course   Critical Care    Date/Time: 9/20/2022 6:17 AM  Performed by: Graham Mendez MD  Authorized by: Graham Mendez MD   Total critical care time (exclusive of procedural time) : 34 minutes  Critical care was necessary to treat or prevent imminent or life-threatening deterioration of the following conditions: CNS failure or compromise.  Critical care was time spent personally by me on the following activities: discussions with consultants, evaluation of patient's response to treatment, examination of patient, obtaining history from patient or surrogate, ordering and performing treatments and interventions, ordering and review of laboratory studies, ordering and review of radiographic studies and re-evaluation of patient's condition.      Labs Reviewed   APTT   PROTIME-INR   CBC W/ AUTO DIFFERENTIAL    Narrative:      The following orders were created for panel order CBC auto differential.  Procedure                               Abnormality         Status                     ---------                               -----------         ------                     CBC with Differential[[id number]]                                                         Please view results for these tests on the individual orders.   COMPREHENSIVE METABOLIC PANEL   URINALYSIS, REFLEX TO URINE CULTURE   CBC WITH DIFFERENTIAL          Imaging Results    None          Medications   clevidipine (CLEVIPREX) 25 mg/50 mL infusion (has no administration in time range)   mannitol 25% injection 50 g (50 g Intravenous New Bag 9/20/22 0615)   mannitol 25% 25 % injection (has no administration in time range)   hydrALAZINE injection 10 mg (10 mg Intravenous Given 9/20/22 0602)   clevidipine (CLEVIPREX) 25 mg/50 mL infusion (25 mg  Given 9/20/22 0618)                 ED Course as of 09/20/22 0627   Tue Sep 20, 2022   0581 Dr Malcolm squires, reviewed CT while on the phone with me.  He recommends mannitol 50 g.  Keppra and Decadron have already been given.  He recommends ICU admission patient will require surgical intervention.  He is ordering MRI for further evaluation [LF]   0618 Paged ICU [MM]   0621 Discussed with ICU, will admit [LF]      ED Course User Index  [LF] Graham Mendez MD  [MM] Kierra Moss                   Clinical Impression:   Final diagnoses:  [I61.9] Intraparenchymal hemorrhage of brain      ED Disposition Condition    Admit Stable                Graham Mendez MD  09/20/22 0627

## 2022-09-20 NOTE — ANESTHESIA PROCEDURE NOTES
Arterial    Diagnosis: intraparenchymal hemorrhage    Patient location during procedure: done in OR  Procedure start time: 9/20/2022 7:56 AM  Timeout: 9/20/2022 7:55 AM  Procedure end time: 9/20/2022 7:56 AM    Staffing  Authorizing Provider: Demarcus Garcia MD  Performing Provider: Aguila Rosario CRNA    Anesthesiologist was present at the time of the procedure.    Preanesthetic Checklist  Completed: patient identified, IV checked, site marked, risks and benefits discussed, surgical consent, monitors and equipment checked, pre-op evaluation, timeout performed and anesthesia consent givenArterial  Skin Prep: alcohol swabs  Orientation: left  Location: radial    Catheter Size: 20 G  Catheter placement by Ultrasound guidance. Heme positive aspiration all ports.   Vessel Caliber: medium, not patent, patent, compressibility normal  Vascular Doppler:  flow caudad, not done  Needle advanced into vessel with real time Ultrasound guidance.  Guidewire confirmed in vessel.Insertion Attempts: 1  Assessment  Dressing: secured with tape and tegaderm

## 2022-09-21 LAB
ANION GAP SERPL CALC-SCNC: 8 MEQ/L
BASOPHILS # BLD AUTO: 0.02 X10(3)/MCL (ref 0–0.2)
BASOPHILS NFR BLD AUTO: 0.1 %
BUN SERPL-MCNC: 14.1 MG/DL (ref 8.9–20.6)
CALCIUM SERPL-MCNC: 9.5 MG/DL (ref 8.4–10.2)
CHLORIDE SERPL-SCNC: 111 MMOL/L (ref 98–107)
CO2 SERPL-SCNC: 25 MMOL/L (ref 22–29)
CREAT SERPL-MCNC: 0.94 MG/DL (ref 0.73–1.18)
CREAT/UREA NIT SERPL: 15
EOSINOPHIL # BLD AUTO: 0 X10(3)/MCL (ref 0–0.9)
EOSINOPHIL NFR BLD AUTO: 0 %
ERYTHROCYTE [DISTWIDTH] IN BLOOD BY AUTOMATED COUNT: 13 % (ref 11.5–17)
GFR SERPLBLD CREATININE-BSD FMLA CKD-EPI: >60 MLS/MIN/1.73/M2
GLUCOSE SERPL-MCNC: 161 MG/DL (ref 74–100)
HCT VFR BLD AUTO: 36.8 % (ref 42–52)
HGB BLD-MCNC: 11.8 GM/DL (ref 14–18)
IMM GRANULOCYTES # BLD AUTO: 0.09 X10(3)/MCL (ref 0–0.04)
IMM GRANULOCYTES NFR BLD AUTO: 0.5 %
LYMPHOCYTES # BLD AUTO: 0.75 X10(3)/MCL (ref 0.6–4.6)
LYMPHOCYTES NFR BLD AUTO: 4.2 %
MCH RBC QN AUTO: 27.3 PG (ref 27–31)
MCHC RBC AUTO-ENTMCNC: 32.1 MG/DL (ref 33–36)
MCV RBC AUTO: 85.2 FL (ref 80–94)
MONOCYTES # BLD AUTO: 1.85 X10(3)/MCL (ref 0.1–1.3)
MONOCYTES NFR BLD AUTO: 10.3 %
NEUTROPHILS # BLD AUTO: 15.2 X10(3)/MCL (ref 2.1–9.2)
NEUTROPHILS NFR BLD AUTO: 84.9 %
NRBC BLD AUTO-RTO: 0 %
PCO2 BLDA: 44 MMHG
PH SMN: 7.46 [PH] (ref 7.35–7.45)
PLATELET # BLD AUTO: 417 X10(3)/MCL (ref 130–400)
PMV BLD AUTO: 10.1 FL (ref 7.4–10.4)
PO2 BLDA: 142 MMHG
POC BASE DEFICIT: 6.6 MMOL/L
POC HCO3: 31.3 MMOL/L
POC IONIZED CALCIUM: 1.12 MMOL/L
POC SATURATED O2: 99 %
POC TEMPERATURE: 37 C
POTASSIUM BLD-SCNC: 3.5 MMOL/L
POTASSIUM SERPL-SCNC: 3.7 MMOL/L (ref 3.5–5.1)
RBC # BLD AUTO: 4.32 X10(6)/MCL (ref 4.7–6.1)
SODIUM BLD-SCNC: 144 MMOL/L
SODIUM SERPL-SCNC: 144 MMOL/L (ref 136–145)
SPECIMEN SOURCE: ABNORMAL
WBC # SPEC AUTO: 17.9 X10(3)/MCL (ref 4.5–11.5)

## 2022-09-21 PROCEDURE — 99900031 HC PATIENT EDUCATION (STAT)

## 2022-09-21 PROCEDURE — 63600175 PHARM REV CODE 636 W HCPCS: Performed by: NURSE PRACTITIONER

## 2022-09-21 PROCEDURE — 63600175 PHARM REV CODE 636 W HCPCS: Performed by: INTERNAL MEDICINE

## 2022-09-21 PROCEDURE — 36415 COLL VENOUS BLD VENIPUNCTURE: CPT | Performed by: NURSE PRACTITIONER

## 2022-09-21 PROCEDURE — 85025 COMPLETE CBC W/AUTO DIFF WBC: CPT | Performed by: NURSE PRACTITIONER

## 2022-09-21 PROCEDURE — 25000003 PHARM REV CODE 250: Performed by: NURSE PRACTITIONER

## 2022-09-21 PROCEDURE — A9577 INJ MULTIHANCE: HCPCS | Performed by: INTERNAL MEDICINE

## 2022-09-21 PROCEDURE — 99024 POSTOP FOLLOW-UP VISIT: CPT | Mod: ,,, | Performed by: NEUROLOGICAL SURGERY

## 2022-09-21 PROCEDURE — 37799 UNLISTED PX VASCULAR SURGERY: CPT

## 2022-09-21 PROCEDURE — 99900035 HC TECH TIME PER 15 MIN (STAT)

## 2022-09-21 PROCEDURE — 27000221 HC OXYGEN, UP TO 24 HOURS

## 2022-09-21 PROCEDURE — 11000001 HC ACUTE MED/SURG PRIVATE ROOM

## 2022-09-21 PROCEDURE — 21400001 HC TELEMETRY ROOM

## 2022-09-21 PROCEDURE — 25500020 PHARM REV CODE 255: Performed by: INTERNAL MEDICINE

## 2022-09-21 PROCEDURE — 80048 BASIC METABOLIC PNL TOTAL CA: CPT | Performed by: NURSE PRACTITIONER

## 2022-09-21 PROCEDURE — 99024 PR POST-OP FOLLOW-UP VISIT: ICD-10-PCS | Mod: ,,, | Performed by: NEUROLOGICAL SURGERY

## 2022-09-21 PROCEDURE — 82803 BLOOD GASES ANY COMBINATION: CPT

## 2022-09-21 PROCEDURE — 25000003 PHARM REV CODE 250: Performed by: INTERNAL MEDICINE

## 2022-09-21 PROCEDURE — 94003 VENT MGMT INPAT SUBQ DAY: CPT

## 2022-09-21 PROCEDURE — 94761 N-INVAS EAR/PLS OXIMETRY MLT: CPT

## 2022-09-21 RX ADMIN — DEXTROSE MONOHYDRATE 1 G: 5 INJECTION INTRAVENOUS at 05:09

## 2022-09-21 RX ADMIN — GADOBENATE DIMEGLUMINE 15 ML: 529 INJECTION, SOLUTION INTRAVENOUS at 03:09

## 2022-09-21 RX ADMIN — DEXAMETHASONE SODIUM PHOSPHATE 4 MG: 4 INJECTION, SOLUTION INTRA-ARTICULAR; INTRALESIONAL; INTRAMUSCULAR; INTRAVENOUS; SOFT TISSUE at 05:09

## 2022-09-21 RX ADMIN — LEVETIRACETAM 500 MG: 100 INJECTION, SOLUTION INTRAVENOUS at 08:09

## 2022-09-21 RX ADMIN — DEXAMETHASONE SODIUM PHOSPHATE 4 MG: 4 INJECTION, SOLUTION INTRA-ARTICULAR; INTRALESIONAL; INTRAMUSCULAR; INTRAVENOUS; SOFT TISSUE at 12:09

## 2022-09-21 RX ADMIN — HYDRALAZINE HYDROCHLORIDE 10 MG: 20 INJECTION INTRAMUSCULAR; INTRAVENOUS at 05:09

## 2022-09-21 RX ADMIN — DEXAMETHASONE SODIUM PHOSPHATE 4 MG: 4 INJECTION, SOLUTION INTRA-ARTICULAR; INTRALESIONAL; INTRAMUSCULAR; INTRAVENOUS; SOFT TISSUE at 11:09

## 2022-09-21 NOTE — PROGRESS NOTES
"Inpatient Nutrition Evaluation    Admit Date: 9/20/2022   Total duration of encounter: 1 day    Nutrition Recommendation/Prescription     Advance diet as medically feasible/tolerated. Goal diet: regular     Prosource TID. Provides 15 g protein and 60 kcals per serving.     Nutrition Assessment     Chart Review    Reason Seen: malnutrition screening tool "unsure" weight loss     Diagnosis:  Hemorrhagic Brain Mass, S/P Craniotomy & Mass Excision     Relevant Medical History: no spmhx     Nutrition-Related Medications: Dexamethasone     Nutrition-Related Labs:  9/21: WBC 17.9, Cl 111, Glu 161     Diet Order: Diet clear liquid  Oral Supplement Order: none at this time  Appetite/Oral Intake: fair/0-25% of meals  Factors Affecting Nutritional Intake: impaired cognitive status/motor control  Food/Islam/Cultural Preferences: none reported       Wound(s):   surgical wounds to head     Comments    9/21/22 Pt confused on visit, he does communicate clearly but does not remember why he is here, states UBW is 130 lbs and that he had been eating well @ home. Nurse reports that he has only consumed water today despite being on clear liquid diet. Encouraged po intake, he agrees to prosource.    Anthropometrics    Height: 5' 11" (180.3 cm) Height Method: Measured  Last Weight: 63.2 kg (139 lb 5.3 oz) (09/20/22 1230) Weight Method: Standard Scale  BMI (Calculated): 19.4  BMI Classification: normal (BMI 18.5-24.9)        Ideal Body Weight (IBW), Male: 172 lb     % Ideal Body Weight, Male (lb): 81.01 %                          Usual Weight Provided By: patient (130 lbs)    Wt Readings from Last 5 Encounters:   09/20/22 63.2 kg (139 lb 5.3 oz)     Weight Change(s) Since Admission:  Admit Weight: 63.2 kg (139 lb 5.3 oz) (09/20/22 0539)      Patient Education    Not applicable.    Monitoring & Evaluation     Dietitian will monitor food and beverage intake and weight.  Nutrition Risk/Follow-Up: low (follow-up in 5-7 days)  Patients " assigned 'low nutrition risk' status do not qualify for a full nutritional assessment but will be monitored and re-evaluated in a 5-7 day time period.

## 2022-09-21 NOTE — PROGRESS NOTES
[facility] - 7th Floor ICU  Pulmonary Critical Care Note    Patient Name: Michael Anthony  MRN: [id number]  Admission Date: 9/20/2022  Hospital Length of Stay: 1 days  Code Status: No Order  Attending Provider: Byron Maki MD  Primary Care Provider: No primary care provider on file.     Subjective:     HPI:   This is a 32 year old male with no prior past medical history who was experiencing headaches for about 1 month. He then had vision changes on 9/20/22 and presented to an outside ED. Imaging revealed a large right frontal lobe intraparenchymal  hemorrhage with underlying mass and a 15mm midline shift with hydrocephalus. He was transferred here and was taken to the OR for right craniotomy with partial frontal lobectomy and excision of hemorrhagic mass. Pathology is pending. He was sent to ICU post operatively. Extubated this AM and currently on room air. He is neuro intact, answers all questions and follows commands with all extremities but states he feels sleepy.       No current outpatient medications    Current Inpatient Medications   atropine        dexamethasone  4 mg Intravenous Q6H    levetiracetam (KEPPRA) 500 mg/100 mL in D5W IVPB (MB+)  500 mg Intravenous Q12H       Current Intravenous Infusions   sodium chloride 0.9% 75 mL/hr at 09/20/22 1830    clevidipine Stopped (09/21/22 0145)    dexmedetomidine (PRECEDEX) infusion Stopped (09/21/22 0400)         Review of Systems   Neurological:         Drowsiness        Objective:       Intake/Output Summary (Last 24 hours) at 9/21/2022 0854  Last data filed at 9/21/2022 0805  Gross per 24 hour   Intake 2637.5 ml   Output 2420 ml   Net 217.5 ml         Vital Signs (Most Recent):  Temp: 99 °F (37.2 °C) (09/21/22 0800)  Pulse: (!) 55 (09/21/22 0800)  Resp: 15 (09/21/22 0800)  BP: 136/69 (09/21/22 0800)  SpO2: 100 % (09/21/22 0800)    Body mass index is 19.43 kg/m².  Weight: 63.2 kg (139 lb 5.3 oz) Vital Signs (24h Range):  Temp:  [97.5 °F (36.4  °C)-99 °F (37.2 °C)] 99 °F (37.2 °C)  Pulse:  [] 55  Resp:  [10-27] 15  SpO2:  [96 %-100 %] 100 %  BP: (104-159)/() 136/69  Arterial Line BP: (108-176)/() 146/51     Physical Exam  Vitals reviewed.   Constitutional:       Appearance: Normal appearance.   HENT:      Head:      Comments: Kerlix dressing around head  Cardiovascular:      Rate and Rhythm: Normal rate.      Heart sounds: Normal heart sounds.   Pulmonary:      Effort: Pulmonary effort is normal.      Breath sounds: Normal breath sounds.   Abdominal:      Palpations: Abdomen is soft.   Musculoskeletal:         General: Normal range of motion.      Cervical back: Neck supple.      Comments: Strength 5/5 in all extremities    Skin:     General: Skin is warm and dry.   Neurological:      General: No focal deficit present.      Mental Status: He is oriented to person, place, and time.         Lines/Drains/Airways       Drain  Duration                  Urethral Catheter 09/20/22 0710 Silicone 16 Fr. 1 day              Arterial Line  Duration             Arterial Line 09/20/22 0756 Left Radial 1 day              Peripheral Intravenous Line  Duration                  Peripheral IV - Single Lumen 09/20/22 0649 18 G Anterior;Left;Proximal Forearm 1 day         Peripheral IV - Single Lumen 09/20/22 0709 20 G Anterior;Right Forearm 1 day                    Significant Labs:    Lab Results   Component Value Date    WBC 17.9 (H) 09/21/2022    HGB 11.8 (L) 09/21/2022    HCT 36.8 (L) 09/21/2022    MCV 85.2 09/21/2022     (H) 09/21/2022         BMP  Lab Results   Component Value Date     09/21/2022    K 3.7 09/21/2022    CO2 25 09/21/2022    BUN 14.1 09/21/2022    CREATININE 0.94 09/21/2022    CALCIUM 9.5 09/21/2022       ABG  Recent Labs   Lab 09/21/22  0552   PH 7.46*   PO2 142*   PCO2 44   HCO3 31.3         Significant Imaging:  CT Head Without Contrast  EXAMINATION  CT HEAD WITHOUT CONTRAST    CLINICAL HISTORY  Craniotomy, post-op;  follow-up    TECHNIQUE  Axial non-contrast CT images of the head were acquired and multiplanar reconstructions accomplished by a CT technologist at a separate workstation, pushed to PACS for physician review.    COMPARISON  20 September 2022    FINDINGS  Images were reviewed in subdural, brain, soft tissue, and bone windows.    Exam quality: Motion/streak artifact limits assessment of the posterior fossa.    Interval changes of right frontoparietal craniotomy are noted, with prominent volume of residual intracranial air.  Changes consistent with excision/evacuation of the large mixed density intraparenchymal hemorrhagic lesion are also noted, with small amount of residual surgical bed extra-axial hemorrhage and newly visualized hyperdense blood within the right lateral ventricle.  There is linear hyperdensity extending through the left frontal lobe parenchyma into the ventricular system consistent with ventriculostomy tubing tract; no remaining tubing component is visualized at this time.    There is markedly improved mass effect, with approximately 11 mm residual leftward midline shift.  No new or worsening sites of intracranial hemorrhage or other focal abnormality appreciated.  Effacement of the right lateral ventricle is improved.  There is similar dilated appearance of the posterior left ventricular components.  Basal cisterns are preserved.    IMPRESSION  1. Postoperative changes with improved regional mass effect and midline shift.  2. Small amount of residual extra-axial hemorrhage through the surgical bed, as well as newly visualized postoperative intraventricular blood.  3. Findings suggestive of small volume blood within left frontal approach ventriculostomy tract.    RADIATION DOSE  Automated tube current modulation, weight-based exposure dosing, and/or iterative reconstruction technique utilized to reach lowest reasonably achievable exposure rate.    DLP: 2381 mGy*cm    Electronically signed by: Iftikhar  Bridgette  Date:    09/21/2022  Time:    06:57          Assessment/Plan:     Assessment  Hemorrhagic brain mass s/p right craniotomy with partial frontal lobectomy and excision of hemorrhagic mass, pathology pending   Intermittent bradycardia      Plan  Continue dexamethasone and keppra per neurosurgery  Plans for PT/OT/ST today  Pathology of brain mass is still pending  Will continue close monitoring in the ICU until cleared by neurosurgery       Lexie Sun, MARIYAP  Pulmonary Critical Care Medicine  [facility] - 7th Floor ICU

## 2022-09-21 NOTE — PLAN OF CARE
Problem: Adult Inpatient Plan of Care  Goal: Plan of Care Review  Outcome: Ongoing, Progressing  Goal: Patient-Specific Goal (Individualized)  Outcome: Ongoing, Progressing  Goal: Absence of Hospital-Acquired Illness or Injury  Outcome: Ongoing, Progressing  Goal: Optimal Comfort and Wellbeing  Outcome: Ongoing, Progressing  Goal: Readiness for Transition of Care  Outcome: Ongoing, Progressing     Problem: Infection  Goal: Absence of Infection Signs and Symptoms  Outcome: Ongoing, Progressing     Problem: Communication Impairment (Mechanical Ventilation, Invasive)  Goal: Effective Communication  Outcome: Met     Problem: Device-Related Complication Risk (Mechanical Ventilation, Invasive)  Goal: Optimal Device Function  Outcome: Met     Problem: Inability to Wean (Mechanical Ventilation, Invasive)  Goal: Mechanical Ventilation Liberation  Outcome: Met     Problem: Nutrition Impairment (Mechanical Ventilation, Invasive)  Goal: Optimal Nutrition Delivery  Outcome: Met     Problem: Skin and Tissue Injury (Mechanical Ventilation, Invasive)  Goal: Absence of Device-Related Skin and Tissue Injury  Outcome: Met

## 2022-09-21 NOTE — PLAN OF CARE
09/21/22 1433   Discharge Assessment   Assessment Type Discharge Planning Assessment   Confirmed/corrected address, phone number and insurance Yes   Confirmed Demographics Correct on Facesheet   Source of Information patient   Reason For Admission brain mass   Lives With parent(s)  (Pt reports he lives with his fatherMichael in a mobile home with 5 steps to enter and rails on both sides)   Facility Arrived From:    Do you expect to return to your current living situation? Yes   Prior to hospitilization cognitive status: Alert/Oriented   Current cognitive status: Alert/Oriented   Walking or Climbing Stairs Difficulty none   Dressing/Bathing Difficulty none   Equipment Currently Used at Home none   Patient currently being followed by outpatient case management? No   Do you currently have service(s) that help you manage your care at home? No   Who is going to help you get home at discharge? pt's fatherMichael   How do you get to doctors appointments? car, drives self   Are you on dialysis? No   Discharge Plan A   (home with home health)   Discharge Plan B   (home)   DME Needed Upon Discharge  other (see comments)  (TBD)   Discharge Plan discussed with: Patient   Discharge Barriers Identified None   Relationship/Environment   Name(s) of Who Lives With Patient pt's Michael reveles     Pt does not have a PCP. His  is his father, Michael ([phone]). He uses any local pharmacy in [place]. Follow for dc needs.

## 2022-09-22 LAB
ALBUMIN SERPL-MCNC: 3.7 GM/DL (ref 3.5–5)
ALBUMIN/GLOB SERPL: 1 RATIO (ref 1.1–2)
ALP SERPL-CCNC: 45 UNIT/L (ref 40–150)
ALT SERPL-CCNC: 12 UNIT/L (ref 0–55)
AST SERPL-CCNC: 29 UNIT/L (ref 5–34)
BASOPHILS # BLD AUTO: 0.02 X10(3)/MCL (ref 0–0.2)
BASOPHILS NFR BLD AUTO: 0.1 %
BILIRUBIN DIRECT+TOT PNL SERPL-MCNC: 1.5 MG/DL
BUN SERPL-MCNC: 10.7 MG/DL (ref 8.9–20.6)
CALCIUM SERPL-MCNC: 9.7 MG/DL (ref 8.4–10.2)
CHLORIDE SERPL-SCNC: 108 MMOL/L (ref 98–107)
CO2 SERPL-SCNC: 24 MMOL/L (ref 22–29)
CREAT SERPL-MCNC: 0.83 MG/DL (ref 0.73–1.18)
EOSINOPHIL # BLD AUTO: 0 X10(3)/MCL (ref 0–0.9)
EOSINOPHIL NFR BLD AUTO: 0 %
ERYTHROCYTE [DISTWIDTH] IN BLOOD BY AUTOMATED COUNT: 13 % (ref 11.5–17)
GFR SERPLBLD CREATININE-BSD FMLA CKD-EPI: >60 MLS/MIN/1.73/M2
GLOBULIN SER-MCNC: 3.6 GM/DL (ref 2.4–3.5)
GLUCOSE SERPL-MCNC: 121 MG/DL (ref 74–100)
HCT VFR BLD AUTO: 39.2 % (ref 42–52)
HGB BLD-MCNC: 12.4 GM/DL (ref 14–18)
IMM GRANULOCYTES # BLD AUTO: 0.07 X10(3)/MCL (ref 0–0.04)
IMM GRANULOCYTES NFR BLD AUTO: 0.4 %
LYMPHOCYTES # BLD AUTO: 0.84 X10(3)/MCL (ref 0.6–4.6)
LYMPHOCYTES NFR BLD AUTO: 5.2 %
MCH RBC QN AUTO: 27.4 PG (ref 27–31)
MCHC RBC AUTO-ENTMCNC: 31.6 MG/DL (ref 33–36)
MCV RBC AUTO: 86.5 FL (ref 80–94)
MONOCYTES # BLD AUTO: 1.4 X10(3)/MCL (ref 0.1–1.3)
MONOCYTES NFR BLD AUTO: 8.6 %
NEUTROPHILS # BLD AUTO: 14 X10(3)/MCL (ref 2.1–9.2)
NEUTROPHILS NFR BLD AUTO: 85.7 %
NRBC BLD AUTO-RTO: 0 %
PLATELET # BLD AUTO: 397 X10(3)/MCL (ref 130–400)
PMV BLD AUTO: 10.4 FL (ref 7.4–10.4)
POTASSIUM SERPL-SCNC: 3.8 MMOL/L (ref 3.5–5.1)
PROT SERPL-MCNC: 7.3 GM/DL (ref 6.4–8.3)
RBC # BLD AUTO: 4.53 X10(6)/MCL (ref 4.7–6.1)
SODIUM SERPL-SCNC: 141 MMOL/L (ref 136–145)
WBC # SPEC AUTO: 16.3 X10(3)/MCL (ref 4.5–11.5)

## 2022-09-22 PROCEDURE — 63600175 PHARM REV CODE 636 W HCPCS: Performed by: NURSE PRACTITIONER

## 2022-09-22 PROCEDURE — 25000003 PHARM REV CODE 250: Performed by: PHYSICIAN ASSISTANT

## 2022-09-22 PROCEDURE — 25000003 PHARM REV CODE 250: Performed by: NURSE PRACTITIONER

## 2022-09-22 PROCEDURE — 21400001 HC TELEMETRY ROOM

## 2022-09-22 PROCEDURE — 97166 OT EVAL MOD COMPLEX 45 MIN: CPT

## 2022-09-22 PROCEDURE — 25000003 PHARM REV CODE 250: Performed by: INTERNAL MEDICINE

## 2022-09-22 PROCEDURE — 99024 PR POST-OP FOLLOW-UP VISIT: ICD-10-PCS | Mod: ,,, | Performed by: NEUROLOGICAL SURGERY

## 2022-09-22 PROCEDURE — 80053 COMPREHEN METABOLIC PANEL: CPT | Performed by: NURSE PRACTITIONER

## 2022-09-22 PROCEDURE — 97162 PT EVAL MOD COMPLEX 30 MIN: CPT

## 2022-09-22 PROCEDURE — 63600175 PHARM REV CODE 636 W HCPCS: Performed by: INTERNAL MEDICINE

## 2022-09-22 PROCEDURE — 36415 COLL VENOUS BLD VENIPUNCTURE: CPT | Performed by: NURSE PRACTITIONER

## 2022-09-22 PROCEDURE — 99024 POSTOP FOLLOW-UP VISIT: CPT | Mod: ,,, | Performed by: NEUROLOGICAL SURGERY

## 2022-09-22 PROCEDURE — 85025 COMPLETE CBC W/AUTO DIFF WBC: CPT | Performed by: NURSE PRACTITIONER

## 2022-09-22 PROCEDURE — 63600175 PHARM REV CODE 636 W HCPCS: Performed by: PHYSICIAN ASSISTANT

## 2022-09-22 RX ORDER — MORPHINE SULFATE 4 MG/ML
2 INJECTION, SOLUTION INTRAMUSCULAR; INTRAVENOUS EVERY 4 HOURS PRN
Status: DISCONTINUED | OUTPATIENT
Start: 2022-09-22 | End: 2022-09-24 | Stop reason: HOSPADM

## 2022-09-22 RX ORDER — LEVETIRACETAM 500 MG/1
500 TABLET ORAL 2 TIMES DAILY
Status: DISCONTINUED | OUTPATIENT
Start: 2022-09-22 | End: 2022-09-24 | Stop reason: HOSPADM

## 2022-09-22 RX ORDER — DEXAMETHASONE 4 MG/1
4 TABLET ORAL EVERY 6 HOURS
Status: DISCONTINUED | OUTPATIENT
Start: 2022-09-22 | End: 2022-09-24 | Stop reason: HOSPADM

## 2022-09-22 RX ORDER — ENOXAPARIN SODIUM 100 MG/ML
40 INJECTION SUBCUTANEOUS EVERY 24 HOURS
Status: DISCONTINUED | OUTPATIENT
Start: 2022-09-22 | End: 2022-09-24 | Stop reason: HOSPADM

## 2022-09-22 RX ADMIN — DEXAMETHASONE 4 MG: 4 TABLET ORAL at 11:09

## 2022-09-22 RX ADMIN — HYDROCODONE BITARTRATE AND ACETAMINOPHEN 1 TABLET: 5; 325 TABLET ORAL at 08:09

## 2022-09-22 RX ADMIN — HYDROCODONE BITARTRATE AND ACETAMINOPHEN 1 TABLET: 5; 325 TABLET ORAL at 12:09

## 2022-09-22 RX ADMIN — SODIUM CHLORIDE: 9 INJECTION, SOLUTION INTRAVENOUS at 05:09

## 2022-09-22 RX ADMIN — HYDROCODONE BITARTRATE AND ACETAMINOPHEN 1 TABLET: 5; 325 TABLET ORAL at 09:09

## 2022-09-22 RX ADMIN — LEVETIRACETAM 500 MG: 500 TABLET, FILM COATED ORAL at 11:09

## 2022-09-22 RX ADMIN — DEXAMETHASONE SODIUM PHOSPHATE 4 MG: 4 INJECTION, SOLUTION INTRA-ARTICULAR; INTRALESIONAL; INTRAMUSCULAR; INTRAVENOUS; SOFT TISSUE at 12:09

## 2022-09-22 RX ADMIN — DEXAMETHASONE 4 MG: 4 TABLET ORAL at 05:09

## 2022-09-22 RX ADMIN — DEXAMETHASONE SODIUM PHOSPHATE 4 MG: 4 INJECTION, SOLUTION INTRA-ARTICULAR; INTRALESIONAL; INTRAMUSCULAR; INTRAVENOUS; SOFT TISSUE at 05:09

## 2022-09-22 RX ADMIN — LEVETIRACETAM 500 MG: 100 INJECTION, SOLUTION INTRAVENOUS at 09:09

## 2022-09-22 RX ADMIN — LEVETIRACETAM 500 MG: 500 TABLET, FILM COATED ORAL at 08:09

## 2022-09-22 NOTE — PLAN OF CARE
Problem: Occupational Therapy  Goal: Occupational Therapy Goal  Description: Goals to be met by: 10/20/22     Patient will increase functional independence with ADLs by performing:    UE Dressing with Modified [place].  LE Dressing with Modified [place].  Grooming while standing with Modified [place].  Toileting from toilet with Modified [place] for hygiene and clothing management.   Toilet transfer to toilet with Modified [place].    Outcome: Ongoing, Progressing

## 2022-09-22 NOTE — PLAN OF CARE
Goals to be met by: 10/22/2022     Patient will increase functional independence with mobility by performin. Sit to stand transfer with [place]  2. Gait  x 500 feet with Modified [place] using LRAD.   3. Ascend/descend 4 stair with bilateral Handrails Modified [place] using LRAD.

## 2022-09-22 NOTE — H&P
[facility] Hospital Medicine History & Physical Examination       Patient Name: Michael Anthony  MRN: [id number]  Patient Class: IP- Inpatient   Admission Date: 9/20/2022  5:42 AM  Length of Stay: 1  Admitting Service: Hospital Medicine   Attending Physician: Hayden Bender MD   Primary Care Provider: No primary care provider on file.  History source: EMR, patient and/or patient's family    CHIEF COMPLAINT   Headache    HISTORY OF PRESENT ILLNESS:   Patient is a 32-year-old male who [place] [facility] [place] with complaints of chronic headache over the last few months and had imaging that showed a hemorrhagic brain mass.  He was transferred to this facility and seen by Neurosurgery for which she underwent craniotomy with neoplasm excision and partial frontal lobectomy 09/20/2022.  He was monitored overnight in the ICU and is being downgraded to the floor for further management.  At bedside he has no complaints and is tolerating an oral diet.  Neurology suspect this is GBM.    PAST MEDICAL HISTORY:   None reported    PAST SURGICAL HISTORY:   None reported    ALLERGIES:   Patient has no known allergies.    FAMILY HISTORY:   Reviewed and non-contributory     SOCIAL HISTORY:     Social History     Tobacco Use    Smoking status: Not on file    Smokeless tobacco: Not on file   Substance Use Topics    Alcohol use: Not on file        HOME MEDICATIONS:     Prior to Admission medications    Not on File       REVIEW OF SYSTEMS:   Except as documented, all other systems reviewed and negative     PHYSICAL EXAM:   T 97.9 °F (36.6 °C)   /78   P 66   RR 16   O2 98 %  GENERAL: awake, alert, oriented and in no acute distress, non-toxic appearing   HEENT: normocephalic; skull bandaged  NECK: supple   LUNGS: Clear bilaterally, no wheezing or rales, no accessory muscle use   CVS: Regular rate and rhythm, normal peripheral perfusion  ABD: Soft, non-tender, non-distended, bowel sounds  present  EXTREMITIES: no clubbing or cyanosis  SKIN: Warm, dry.   NEURO: alert and oriented, grossly without focal deficits   PSYCHIATRIC: Cooperative    LABS AND IMAGING:     Recent Labs     09/20/22  0610 09/21/22  0148   WBC 15.5* 17.9*   RBC 5.02 4.32*   HGB 13.8* 11.8*   HCT 42.8 36.8*   MCV 85.3 85.2   MCH 27.5 27.3   MCHC 32.2* 32.1*   RDW 12.9 13.0   * 417*     No results for input(s): LACTIC in the last 72 hours.  Recent Labs     09/20/22  0608   INR 1.09     No results for input(s): HGBA1C, CHOL, TRIG, LDL, VLDL, HDL in the last 72 hours.   Recent Labs     09/20/22  0610 09/21/22  0148    144   K 4.3 3.7   CHLORIDE 107 111*   CO2 23 25   BUN 12.5 14.1   CREATININE 0.78 0.94   GLUCOSE 142* 161*   CALCIUM 10.4* 9.5   ALBUMIN 4.4  --    GLOBULIN 3.4  --    ALKPHOS 49  --    ALT 11  --    AST 16  --    BILITOT 1.0  --      No results for input(s): BNP, CPK, TROPONINI in the last 72 hours.       MRI BRAIN W WO CONTRAST  Impression: Findings seen consistent with recent craniotomy in the right frontal region for resection of a hemorrhagic mass with some persistent enhancement seen along the mesial margin of the surgical bed suggesting some residual mass. Fluid seen in the surgical bed with subdural hematoma seen on the right side as well with air seen at the craniotomy site.  Midline shift from right to left  Intraventricular hemorrhage seen in the right lateral ventricle  Electronically signed by: Lokesh Mason  Date:    09/21/2022  Time:    16:05    CT Head Without Contrast  IMPRESSION  1. Postoperative changes with improved regional mass effect and midline shift.  2. Small amount of residual extra-axial hemorrhage through the surgical bed, as well as newly visualized postoperative intraventricular blood.  3. Findings suggestive of small volume blood within left frontal approach ventriculostomy tract.  RADIATION DOSE  Automated tube current modulation, weight-based exposure dosing, and/or  iterative reconstruction technique utilized to reach lowest reasonably achievable exposure rate.  DLP: 2381 mGy*cm  Electronically signed by: Iftikhar Angeles  Date:    09/21/2022  Time:    06:57      ASSESSMENT & PLAN:   Hemorrhagic brain mass, likely GBM, status post craniotomy/excision 9/20    - downgrade to floor  - nsgy following, awaiting path   - PT/OT   - keep SBP <160     DVT prophylaxis: SCDs  Code status: full     If patient was admitted under observational status it is with my approval/permission.     At least 55 min was spent on this history and physical.  Time seen: 11PM   Hayden Bender MD

## 2022-09-22 NOTE — PROGRESS NOTES
Looks great  No HA  No focal weakness  MRI shows no significant residual enhancing tissue  Path pending (but still likely GBM)  DC delatorre, cleviprex, a-line  PT/OT  Transfer to floor  Home in next 1-2 days once path available and med onc/rad onc consults or appts arranged

## 2022-09-22 NOTE — PHYSICIAN QUERY
PT Name: Michael Anthony  MR #: [id number]     DOCUMENTATION CLARIFICATION     CDS/: Shamir Grimes RN              Contact information: [email or web address]  This form is a permanent document in the medical record.    Query Date: September 22, 2022    By submitting this query, we are merely seeking further clarification of documentation.  Please utilize your independent clinical judgment when addressing the question(s) below.    The Medical Record contains the following:  Indicators Supporting Clinical Findings Location in Medical Record   x Decreased LOC, neurological deficits Patient is lethargic but answers questions and follows commands occasionally requires some redirection.  He has ataxia in the bilateral upper extremities.  There is no hyperreflexia in the upper extremities.  He elevates each leg independently   ED Provider 09/20                   x Theresa Coma Scale (GCS) IMC transfer for neurosurgery services d/t R parietal IPH. with severe right sided HA and N/V. Gcs 14   ED Provider 09/20    Traumatic Injury     x Acute/Chronic Conditions This is a 32 year old male with no prior past medical history who was experiencing headaches for about 1 month. He then had vision changes on 9/20/22 and presented to an outside ED. Imaging revealed a large right frontal lobe intraparenchymal  hemorrhage with underlying mass and a 15mm midline shift with hydrocephalus.   Critical Care Medicine PN 09/21   x Radiology IMPRESSION  1. Postoperative changes with improved regional mass effect and midline shift.  2. Small amount of residual extra-axial hemorrhage through the surgical bed, as well as newly visualized postoperative intraventricular blood.  3. Findings suggestive of small volume blood within left frontal approach ventriculostomy tract    Impression:     Findings seen consistent with recent craniotomy in the right frontal region for resection of a hemorrhagic mass with some persistent enhancement seen along the  "mesial margin of the surgical bed suggesting some residual mass.     Fluid seen in the surgical bed with subdural hematoma seen on the right side as well with air seen at the craniotomy site.     Midline shift from right to left     Intraventricular hemorrhage seen in the right lateral ventricle   CT head without contrast 09/21                        MRI Brain W WO contrast 09/21   x Treatment (i.e. IV Steroids, Mannitol, Surgery) Dr Malcolm squires, reviewed CT while on the phone with me.  He recommends mannitol 50 g.  Keppra and Decadron have already been given.  He recommends ICU admission patient will require surgical intervention   ED Provider 09/20                  Other         Provider, please clarify the documentation associated with "midline shift" associated with the above clinical findings.   [ X  ] Non-Traumatic Brain Compression      [  X ] Other neurological diagnosis (please specify): spontaneous hemorrhage within a previously undiagnosed GBM causing herniation and emergency surgery     [  ] Clinically Undetermined     Please document in your progress notes daily for the duration of treatment until resolved and include in your discharge summary.    Form No. 72925      "

## 2022-09-22 NOTE — PROGRESS NOTES
POD#2 s/p right crani, partial frontal lobectomy for excision of hemorrhagic mass  He is resting at time of rounds, NAD  He easily arouses  He currently denies HA, N/V and blurred vision  No acute events overnight    AFVSS  PERRL, EOMI  Alex well, no deficits appreciated  Turban dressing in place  Right periorbital swelling noted    Plan:   Continue current treatment  Continue PT/OT  Continue Keppra and decadron  Path pending at time of rounds  Awaiting path report for oncology consult  SCDs for DVT prophylaxis; ok for lovenox

## 2022-09-22 NOTE — PT/OT/SLP EVAL
Physical Therapy Evaluation    Patient Name:  Michael Anthony   MRN:  [id number]    Recommendations:     Discharge Recommendations:  home with home health vs rehab pending progress  Discharge Equipment Recommendations:  (TBD)   Barriers to discharge:  acuity of illness    Assessment:     Michael Anthony is a 32 y.o. male admitted with a medical diagnosis of Brain mass, s/p craniotomy, and partial frontal lobe lobectomy.  He presents with the following impairments/functional limitations:  weakness, impaired endurance, impaired self care skills, impaired functional mobility, gait instability, decreased coordination, pain.    Rehab Prognosis: Good; patient would benefit from acute skilled PT services to address these deficits and reach maximum level of function.    Recent Surgery: Procedure(s) (LRB):  CRANIOTOMY, WITH NEOPLASM EXCISION USING COMPUTER-ASSISTED NAVIGATION (Right)  VENTRICULOSTOMY (Left) 2 Days Post-Op    Plan:     During this hospitalization, patient to be seen daily (/BID) to address the identified rehab impairments via gait training, therapeutic activities, therapeutic exercises, neuromuscular re-education and progress toward the following goals:    Plan of Care Expires:  10/22/22    Subjective     Chief Complaint: pain  Patient/Family Comments/goals: to go home  Pain/Comfort:  Pain Rating 1: 4/10  Location 1: head    Patients cultural, spiritual, Judaism conflicts given the current situation: no    Living Environment:  Pt lives with father in a mobile home with 4 steps to enter with bilateral handrails.   Prior to admission, patients level of function was independent.  Equipment used at home: none.  DME owned (not currently used): none.  Upon discharge, patient will have assistance from father.    Objective:     Communicated with RN prior to session.  Patient found HOB elevated with peripheral IV, telemetry  upon PT entry to room.    General Precautions: Standard,  (SBP <160 mmHg)   Orthopedic  Precautions:N/A   Braces: N/A  Respiratory Status: Room air    BP prior to mobility: 145/79 mmHg  BP sitting EOB: 151/85 mmHg    Exams:  Cognitive Exam:  Patient is oriented to Person, Place, Time, and Situation  RLE ROM: WFL  RLE Strength: WFL except ankle DF 3-/5  LLE ROM: WFL  LLE Strength: WFL except ankle DF 3-/5    Functional Mobility:  Bed Mobility:     Supine to Sit: SBA  Sit to Supine: SBA  Transfers:     Sit to Stand: SBA with no AD  Gait: pt ambulated 15 ft with a steppage gt pattern with no AD and [place].       AM-PAC 6 CLICK MOBILITY  Total Score:21     Patient left HOB elevated with all lines intact, call button in reach, and RN notified.    GOALS:   Multidisciplinary Problems       Physical Therapy Goals          Problem: Physical Therapy    Goal Priority Disciplines Outcome Goal Variances Interventions   Physical Therapy Goal     PT, PT/OT      Description: Goals to be met by: 10/22/2022     Patient will increase functional independence with mobility by performin. Sit to stand transfer with [place]  2. Gait  x 500 feet with Modified [place] using LRAD.   3. Ascend/descend 4 stair with bilateral Handrails Modified [place] using LRAD.                          History:     History reviewed. No pertinent past medical history.    History reviewed. No pertinent surgical history.    Time Tracking:     PT Received On: 22  PT Start Time: 1031     PT Stop Time: 1045  PT Total Time (min): 14 min     Billable Minutes: Evaluation , moderate complexity      2022

## 2022-09-22 NOTE — PROGRESS NOTES
[facility] [place]  Hospital Medicine Progress Note        Chief Complaint: Inpatient Follow-up for brain mass    HPI:   32-year-old male who presented to the ER at [facility] on 09/20/22 with complaints of chronic headache over the last few months and had imaging that showed a hemorrhagic brain mass.  He was transferred to this facility and seen by Neurosurgery for which she underwent craniotomy with neoplasm excision and partial frontal lobectomy 09/20/2022.  He was monitored overnight in the ICU and is being downgraded to the floor for further management.  At bedside he has no complaints and is tolerating an oral diet.  Neurology suspect this is GBM.    Interval Hx:  Patient resting comfortably in bed.  He did is ambulatory.  Denies headache.  Only complaint is that he is hungry.    Objective/physical exam:  General: In no acute distress, afebrile  Chest: Clear to auscultation bilaterally  Heart: RRR, +S1, S2, no appreciable murmur  Abdomen: Soft, nontender, BS +  MSK: Warm, no lower extremity edema, no clubbing or cyanosis  Neurologic: Alert and oriented x4, Cranial nerve II-XII intact, Strength 5/5 in all 4 extremities    VITAL SIGNS: 24 HRS MIN & MAX LAST   Temp  Min: 97.9 °F (36.6 °C)  Max: 99 °F (37.2 °C) 98.2 °F (36.8 °C)   BP  Min: 116/73  Max: 148/85 121/60   Pulse  Min: 39  Max: 84  (!) 55     Resp  Min: 12  Max: 20 18   SpO2  Min: 97 %  Max: 100 % 98 %       Recent Labs   Lab 09/20/22  0610 09/21/22  0148 09/22/22  0149   WBC 15.5* 17.9* 16.3*   RBC 5.02 4.32* 4.53*   HGB 13.8* 11.8* 12.4*   HCT 42.8 36.8* 39.2*   MCV 85.3 85.2 86.5   MCH 27.5 27.3 27.4   MCHC 32.2* 32.1* 31.6*   RDW 12.9 13.0 13.0   * 417* 397   MPV 10.8* 10.1 10.4       Recent Labs   Lab 09/20/22  0610 09/20/22  1451 09/20/22  1812 09/21/22  0148 09/21/22  0552 09/22/22  0149     --   --  144  --  141   K 4.3  --   --  3.7  --  3.8   CO2 23  --   --  25  --  24   BUN 12.5  --   --  14.1  --  10.7    CREATININE 0.78  --   --  0.94  --  0.83   CALCIUM 10.4*  --   --  9.5  --  9.7   PH  --  7.50* 7.51*  --  7.46*  --    ALBUMIN 4.4  --   --   --   --  3.7   ALKPHOS 49  --   --   --   --  45   ALT 11  --   --   --   --  12   AST 16  --   --   --   --  29   BILITOT 1.0  --   --   --   --  1.5          Microbiology Results (last 7 days)       ** No results found for the last 168 hours. **             See below for Radiology    Scheduled Med:   dexamethasone  4 mg Intravenous Q6H    levetiracetam (KEPPRA) 500 mg/100 mL in D5W IVPB (MB+)  500 mg Intravenous Q12H        Continuous Infusions:   sodium chloride 0.9% 75 mL/hr at 09/20/22 1830    dexmedetomidine (PRECEDEX) infusion Stopped (09/21/22 0400)        PRN Meds:  acetaminophen, hydrALAZINE, HYDROcodone-acetaminophen, labetaloL, morphine, ondansetron       Assessment/Plan:   Hemorrhagic brain mass, likely GBM, status post craniotomy/excision 9/20     Pathology still pending as of this morning.    he does have a significant leukocytosis but is also on dexamethasone.    continue Keppra for seizure prophylaxis in the setting of brain mass status post resection.    neurosurgery following along.    plan to consult Oncology once we have some pathology back.  continue PT and OT.    Patient condition:  Stable    Anticipated discharge and Disposition: TBD      All diagnosis and differential diagnosis have been reviewed; assessment and plan has been documented; I have personally reviewed the labs and test results that are presently available; I have reviewed the patients medication list; I have reviewed the consulting providers response and recommendations. I have reviewed or attempted to review medical records based upon their availability    All of the patient's questions have been  addressed and answered. Patient's is agreeable to the above stated plan. I will continue to monitor closely and make adjustments to medical management as  needed.  _____________________________________________________________________      Radiology:  MRI BRAIN W WO CONTRAST  Narrative: EXAMINATION:  MRI BRAIN W WO CONTRAST    CLINICAL HISTORY:  brain mass, s/p resection;    TECHNIQUE:  Multiplanar multisequence MR imaging of the brain was performed without and with contrast.    COMPARISON:  CT scan from 09/20/2022 and 09/21/2022    FINDINGS:  The patient has undergone recent craniotomy for resection of a large mass in the right frontal lobe.  There is air seen in the surgical site and a subdural hematoma seen in the right frontal parietal region.  Maximum dimension is subdural hematoma is 12 mm.  Subdural blood was seen on the recent CT scan as well but appears slightly more prominent.  There is fluid seen in the surgical site consistent with recent surgery.  Some hemorrhage is also seen at the surgical site.  There is some significant edema seen with mass effect of on the left lateral ventricle and shift of the midline to the left by proximally 15 mm.  There is blood seen within the lateral ventricle on the right side.  There is blood seen along what appears to be a ventriculostomy tract on the left side..    There is some persistent enhancement seen along the margin of the surgical bed suggesting residual mass.  Impression: Findings seen consistent with recent craniotomy in the right frontal region for resection of a hemorrhagic mass with some persistent enhancement seen along the mesial margin of the surgical bed suggesting some residual mass.    Fluid seen in the surgical bed with subdural hematoma seen on the right side as well with air seen at the craniotomy site.    Midline shift from right to left    Intraventricular hemorrhage seen in the right lateral ventricle    Electronically signed by: Lokesh Mason  Date:    09/21/2022  Time:    16:05  CT Head Without Contrast  EXAMINATION  CT HEAD WITHOUT CONTRAST    CLINICAL HISTORY  Craniotomy, post-op;  follow-up    TECHNIQUE  Axial non-contrast CT images of the head were acquired and multiplanar reconstructions accomplished by a CT technologist at a separate workstation, pushed to PACS for physician review.    COMPARISON  20 September 2022    FINDINGS  Images were reviewed in subdural, brain, soft tissue, and bone windows.    Exam quality: Motion/streak artifact limits assessment of the posterior fossa.    Interval changes of right frontoparietal craniotomy are noted, with prominent volume of residual intracranial air.  Changes consistent with excision/evacuation of the large mixed density intraparenchymal hemorrhagic lesion are also noted, with small amount of residual surgical bed extra-axial hemorrhage and newly visualized hyperdense blood within the right lateral ventricle.  There is linear hyperdensity extending through the left frontal lobe parenchyma into the ventricular system consistent with ventriculostomy tubing tract; no remaining tubing component is visualized at this time.    There is markedly improved mass effect, with approximately 11 mm residual leftward midline shift.  No new or worsening sites of intracranial hemorrhage or other focal abnormality appreciated.  Effacement of the right lateral ventricle is improved.  There is similar dilated appearance of the posterior left ventricular components.  Basal cisterns are preserved.    IMPRESSION  1. Postoperative changes with improved regional mass effect and midline shift.  2. Small amount of residual extra-axial hemorrhage through the surgical bed, as well as newly visualized postoperative intraventricular blood.  3. Findings suggestive of small volume blood within left frontal approach ventriculostomy tract.    RADIATION DOSE  Automated tube current modulation, weight-based exposure dosing, and/or iterative reconstruction technique utilized to reach lowest reasonably achievable exposure rate.    DLP: 2381 mGy*cm    Electronically signed by: Iftikhar  Bridgette  Date:    09/21/2022  Time:    06:57      Moses Hunter MD   09/22/2022

## 2022-09-22 NOTE — NURSING
Nurses Note -- 4 Eyes      9/22/2022   3:27 AM      Skin assessed during: Transfer      [x] No Pressure Injuries Present    []Prevention Measures Documented      [] Yes- Altered Skin Integrity Present or Discovered   [] LDA Added if Not in Epic (Describe Wound)   [] New Altered Skin Integrity was Present on Admit and Documented in LDA   [] Wound Image Taken    Wound Care Consulted? No    Attending Nurse:  Bernard Billings RN     Second RN/Staff Member:  TUSHAR Grimes RN

## 2022-09-22 NOTE — PT/OT/SLP EVAL
Occupational Therapy   Evaluation    Name: Michael Anthony  MRN: [id number]  Admitting Diagnosis:  Brain mass  Recent Surgery: Procedure(s) (LRB):  CRANIOTOMY, WITH NEOPLASM EXCISION USING COMPUTER-ASSISTED NAVIGATION (Right)  VENTRICULOSTOMY (Left) 2 Days Post-Op    Recommendations:     Discharge Recommendations: home with home health, rehabilitation facility (pending progress)  Discharge Equipment Recommendations:  other (see comments) (Pending progress)  Barriers to discharge:       Assessment:     Michael Anthony is a 32 y.o. male with a medical diagnosis of Brain mass.  He presents with a headache, fatigue, and impulsivity. Pt was willing to participate in OT session despite fatigue, and desires to return home Performance deficits affecting function: impaired functional mobility, impaired self care skills, gait instability, impaired cognition, pain, impaired coordination, decreased coordination, other (comment), decreased safety awareness (possible deficits related to insight, saftey, and impulsivness - unable to determine 2/2 unknown cognitive baseline, further assessment needed to determine).      Rehab Prognosis: Good; patient would benefit from acute skilled OT services to address these deficits and reach maximum level of function.       Plan:     Patient to be seen 5 x/week, 3 x/week to address the above listed problems via self-care/home management, therapeutic exercises, therapeutic activities  Plan of Care Expires: 10/20/22  Plan of Care Reviewed with: patient    Subjective     Chief Complaint: Headache/Pain  Patient/Family Comments/goals: Pt desires to return home.     Occupational Profile:  Living Environment: Lives with Father in mobile home, 4 steps to enter w/ rails on both sides  Previous level of function: Independent  Roles and Routines: Works at [employer]   Equipment Used at Home:  none  Assistance upon Discharge: Pt reports his father works during the day and that he's usually home alone - further  information needed to determine     Pain/Comfort:  Pain Rating 1: 4/10  Location 1: head  Pain Addressed 1: Reposition, Distraction    Patients cultural, spiritual, Faith conflicts given the current situation: no    Objective:     Communicated with: nsg and PT prior to session.  Patient found left sidelying with peripheral IV, telemetry upon OT entry to room.    General Precautions: Standard,     Orthopedic Precautions:    Braces:    Respiratory Status: Room air    Occupational Performance:    Bed Mobility:    Patient completed Supine to Sit with stand by assistance    Functional Mobility/Transfers:  Pt completed Sit <> Supine Transfer with SBA with no AD  Pt ambulated to bathroom with Min A and no AD   Functional Mobility: Pt able to ambulate from one side of the bed to the other with SBA and no AD.     Activities of Daily Living:  LE Dressing - Pt donned and doffed socks @ EOB with Min A /SBA - dynamic sitting balance momentarily unsteady throughout  Toileting - Pt able to complete toileting in standing with SBA/Min A with no AD    Cognitive/Visual Perceptual:  F > F - L UE slightly delayed with possible deficits in precision and coordination  F > N - L UE slightly delayed with possible deficits in precision and coordination  - Presence of possible deficits reguarding insight  - Unable to determine 2/2 unknown baseline for personality and/or cognition, further observation/assessment required to determine.     Physical Exam:  B UE strength and ROM WFL  - Possible sensation deficits in L UE observed when donning socks @ EOB, pt reports normal sensation    Treatment & Education:  Pt educated on POC and mobility safety precautions     Patient left left sidelying with all lines intact, call button in reach, and bed alarm on    GOALS:   Multidisciplinary Problems       Occupational Therapy Goals          Problem: Occupational Therapy    Goal Priority Disciplines Outcome Interventions   Occupational Therapy Goal      OT, PT/OT Ongoing, Progressing    Description: Goals to be met by: 10/20/22     Patient will increase functional independence with ADLs by performing:    UE Dressing with Modified [place].  LE Dressing with Modified [place].  Grooming while standing with Modified [place].  Toileting from toilet with Modified [place] for hygiene and clothing management.   Toilet transfer to toilet with Modified [place].                         History:     History reviewed. No pertinent past medical history.    History reviewed. No pertinent surgical history.    Time Tracking:     OT Date of Treatment:    OT Start Time: 1115  OT Stop Time: 1130  OT Total Time (min): 15 min    Billable Minutes:Evaluation Mod Complexity - 15 min    9/22/2022

## 2022-09-23 PROCEDURE — 99024 POSTOP FOLLOW-UP VISIT: CPT | Mod: ,,, | Performed by: NURSE PRACTITIONER

## 2022-09-23 PROCEDURE — 97530 THERAPEUTIC ACTIVITIES: CPT

## 2022-09-23 PROCEDURE — 21400001 HC TELEMETRY ROOM

## 2022-09-23 PROCEDURE — 99024 PR POST-OP FOLLOW-UP VISIT: ICD-10-PCS | Mod: ,,, | Performed by: NURSE PRACTITIONER

## 2022-09-23 PROCEDURE — 25000003 PHARM REV CODE 250: Performed by: PHYSICIAN ASSISTANT

## 2022-09-23 PROCEDURE — 63600175 PHARM REV CODE 636 W HCPCS: Performed by: PHYSICIAN ASSISTANT

## 2022-09-23 PROCEDURE — 63600175 PHARM REV CODE 636 W HCPCS: Performed by: HOSPITALIST

## 2022-09-23 RX ORDER — DEXAMETHASONE 2 MG/1
2 TABLET ORAL 2 TIMES DAILY
Qty: 60 TABLET | Refills: 1 | Status: SHIPPED | OUTPATIENT
Start: 2022-09-23 | End: 2022-10-23

## 2022-09-23 RX ORDER — LEVETIRACETAM 500 MG/1
500 TABLET ORAL 2 TIMES DAILY
Qty: 60 TABLET | Refills: 11 | Status: SHIPPED | OUTPATIENT
Start: 2022-09-23 | End: 2022-11-01 | Stop reason: SDUPTHER

## 2022-09-23 RX ORDER — HYDROCODONE BITARTRATE AND ACETAMINOPHEN 5; 325 MG/1; MG/1
1 TABLET ORAL EVERY 6 HOURS PRN
Qty: 28 TABLET | Refills: 0 | Status: SHIPPED | OUTPATIENT
Start: 2022-09-23 | End: 2023-04-12

## 2022-09-23 RX ADMIN — ENOXAPARIN SODIUM 40 MG: 40 INJECTION SUBCUTANEOUS at 05:09

## 2022-09-23 RX ADMIN — DEXAMETHASONE 4 MG: 4 TABLET ORAL at 11:09

## 2022-09-23 RX ADMIN — LEVETIRACETAM 500 MG: 500 TABLET, FILM COATED ORAL at 08:09

## 2022-09-23 RX ADMIN — LEVETIRACETAM 500 MG: 500 TABLET, FILM COATED ORAL at 09:09

## 2022-09-23 RX ADMIN — DEXAMETHASONE 4 MG: 4 TABLET ORAL at 06:09

## 2022-09-23 RX ADMIN — DEXAMETHASONE 4 MG: 4 TABLET ORAL at 05:09

## 2022-09-23 RX ADMIN — DEXAMETHASONE 4 MG: 4 TABLET ORAL at 12:09

## 2022-09-23 NOTE — PT/OT/SLP PROGRESS
Physical Therapy Treatment    Patient Name:  Michael Anthony   MRN:  [id number]    Recommendations:     Discharge Recommendations:  rehabilitation facility, home with home health   Discharge Equipment Recommendations:  (TBD)   Barriers to discharge:  acuity of illness    Assessment:     Michael Anthony is a 32 y.o. male admitted with a medical diagnosis of Brain mass.  He presents with the following impairments/functional limitations:  weakness, impaired endurance, impaired self care skills, impaired functional mobility, gait instability, pain. PT spoke to pt's grandfather and expressed the need to have someone with the pt 24/7 upon discharge and the grandfather verbalized understanding.     Rehab Prognosis: Good; patient would benefit from acute skilled PT services to address these deficits and reach maximum level of function.    Recent Surgery: Procedure(s) (LRB):  CRANIOTOMY, WITH NEOPLASM EXCISION USING COMPUTER-ASSISTED NAVIGATION (Right)  VENTRICULOSTOMY (Left) 3 Days Post-Op    Plan:     During this hospitalization, patient to be seen daily to address the identified rehab impairments via gait training, therapeutic activities, therapeutic exercises, neuromuscular re-education and progress toward the following goals:    Plan of Care Expires:  10/23/22    Subjective     Chief Complaint: pain   Patient/Family Comments/goals: to go home  Pain/Comfort:  Pain Rating 1: 4/10  Location 1: head      Objective:     Communicated with RN prior to session.  Patient found HOB elevated with peripheral IV upon PT entry to room.     General Precautions: Standard,  (SBP <160 mmHg)   Orthopedic Precautions:N/A   Braces: N/A  Respiratory Status: Room air     BP prior to mobility: 153/76 mmHg   BP post mobility: 149/92 mmHg    Functional Mobility:  Bed Mobility:     Supine to Sit: modified independence  Sit to Supine: modified independence  Transfers:     Sit to Stand:  modified independence with no AD  Gait: pt ambulated 400 ft with  a steady gt pattern with no AD and SBA. Pt demo'd no LOB with cognitive tasks during ambulation.  Stairs:  Pt ascended/descended 4 stair(s) with No Assistive Device with right handrail with Contact Guard Assistance. Pt demo'd a slight lateral veer when turning around on steps but did not experience any LOB.        AM-PAC 6 CLICK MOBILITY  Turning over in bed (including adjusting bedclothes, sheets and blankets)?: 4  Sitting down on and standing up from a chair with arms (e.g., wheelchair, bedside commode, etc.): 4  Moving from lying on back to sitting on the side of the bed?: 4  Moving to and from a bed to a chair (including a wheelchair)?: 3  Need to walk in hospital room?: 3  Climbing 3-5 steps with a railing?: 3  Basic Mobility Total Score: 21       Patient left HOB elevated with all lines intact, call button in reach, RN notified, and grandfather present..    GOALS:   Multidisciplinary Problems       Physical Therapy Goals          Problem: Physical Therapy    Goal Priority Disciplines Outcome Goal Variances Interventions   Physical Therapy Goal     PT, PT/OT      Description: Goals to be met by: 10/22/2022     Patient will increase functional independence with mobility by performin. Sit to stand transfer with [place]  2. Gait  x 500 feet with Modified [place] using LRAD.   3. Ascend/descend 4 stair with bilateral Handrails Modified [place] using LRAD.                          Time Tracking:     PT Received On: 22  PT Start Time: 0834     PT Stop Time: 0848  PT Total Time (min): 14 min     Billable Minutes: Therapeutic Activity 14 minutes     Treatment Type: Treatment  PT/PTA: PT     PTA Visit Number: 1     2022

## 2022-09-23 NOTE — PLAN OF CARE
Problem: Adult Inpatient Plan of Care  Goal: Plan of Care Review  Outcome: Ongoing, Progressing  Goal: Patient-Specific Goal (Individualized)  Outcome: Ongoing, Progressing  Goal: Absence of Hospital-Acquired Illness or Injury  Outcome: Ongoing, Progressing  Goal: Optimal Comfort and Wellbeing  Outcome: Ongoing, Progressing  Goal: Readiness for Transition of Care  Outcome: Ongoing, Progressing     Problem: Infection  Goal: Absence of Infection Signs and Symptoms  Outcome: Ongoing, Progressing     Problem: Infection  Goal: Absence of Infection Signs and Symptoms  Outcome: Ongoing, Progressing     Problem: Ventilator-Induced Lung Injury (Mechanical Ventilation, Invasive)  Goal: Absence of Ventilator-Induced Lung Injury  Outcome: Ongoing, Progressing

## 2022-09-23 NOTE — OP NOTE
DATE OF OPERATION:   September 20, 2022     PREOPERATIVE DIAGNOSIS:   1. Right frontal hemorrhagic mass with herniation      POSTOPERATIVE DIAGNOSIS:   1. Right frontal hemorrhagic mass with herniation; likely glioblastoma multiforme      SURGEON:  Duglas Fowler M.D.    ASSISTANT: MANUEL Franklin     PROCEDURE:   1. Right frontoparietal craniotomy with near total resection of hemorrhagic malignant glioma  2.  Insertion of right frontal ventriculostomy via separate twist drill hole  2.  Stereotactic volumetric resection with the Stealth neuro-navigation system   3.  Microdissection for intracranial procedure     ANESTHESIA:   General endotracheal     BLOOD LOSS:   300 cc     SPECIMEN(s):   Tumor for touch prep/frozen and permanent pathology     COMPLICATIONS:   None     DRAINS:   None    HISTORY:   The patient is a 32-year-old previously healthy gentleman with no neurologic complaints prior to a couple of days before admission.  He began with a fairly sudden onset of severe headache and this progressed to the point where his father brought him into the emergency room.  CT scan of the brain showed a large right frontal mass with some internal hemorrhage and surrounding edema.  He was reportedly a little drowsy, but otherwise not in extremis.  However, when I arrived about 30 minutes later, he was showing signs of extensor posturing although he would say his name in between episodes suggesting herniation fits in any event he was given 1 grams/kilogram of mannitol, 40 mg of Lasix, taken to CT for a stereotactic scan and then emergently to surgery.  This was discussed with his father who understood and accepted the nature of the surgery as well as its attendant risks..     FINDINGS:   His prior to performing the craniotomy, a right frontal ventriculostomy was placed to temporize and remove as much CSF as possible.  Surprisingly, the pressure was not particularly elevated.  The patient had urinated  approximately 1500 cc due to the diuretics by that time.  Once the craniotomy was completed, the dura was very full, but not particularly tense.  There was a large right frontal hemorrhagic mass and frozen section/squash prep was consistent with high-grade glioma.  An anterior right frontal lobectomy was completed.  Tumor was extending in the subarachnoid space into the sylvian fissure and this was removed.  There was no obvious spasm in the exposed middle cerebral vessels, but papaverine was placed after the procedure and then irrigated out after a minute.  The contralateral ventriculostomy catheter was seen in the left lateral ventricle.  I felt that we had a gross total resection, that is removal of all the grossly abnormal tissue with corresponding anatomical confirmation with the navigation system.  The ventriculostomy catheter was removed at the completion of procedure due to the complete lack of mass effect at closing.  PROCEDURE IN DETAIL:   Prior to surgery, the patient underwent CT with contrast scanning with fiducial markers in place.? Then the image data was transferred to the computer and a 3D model was built..? The patient was then brought to the holding area where he was seen immediately by Anesthesiology and taken to the operating room for intubation.  operating room.? After endotracheal intubation and induction of general anesthesia the patient was positioned to optimize the venous drainage and location of the tumor resection. ?The patient received intravenous antibiotics prior to the start of the procedure and, if necessary, at regular intervals throughout the surgery.? The head was shaved, prepped and draped in the usual fashion after outlining the tumor on the skin surface and an appropriate incision was marked out overlying the proposed site of tumor resection.  A stab incision in the midpupillary line at the right coronal suture was made after infiltrating with local anesthetic containing  epinephrine.  The twist drill was used to enter the cranium and then the dura was perforated.  A ventriculostomy catheter was inserted into the frontal horn of the lateral ventricle without difficulty with some blood-tinged CSF, but not under significantly elevated pressure.  This was tunneled and then connected to a drainage system.  The entry hole was secured with 2-0 silk.  The craniotomy incision was infiltrated with 0.5% xylocaine containing 1:200,000 epinephrine.? The incision was carried down through the skin and subcutaneous tissues with the knife and then Chikis clips were applied to the scalp edges.? The flap was reflected and held in place with blunt fishhooks? The air-driven  was used to enter the cranium circumferentially around the area of the tumor. The craniotome was then used to elevate a flap.    Then the area of the tumor was then identified with the neuronavigation system, and then the dura was opened circumferentially around this.  Then the operating microscope was brought into place.? There was no hemorrhage at the brain surface, but there was obvious tumor extending into the subarachnoid space at the sylvian fissure.  The right frontal lobe was entered as all the sulci were compressed and lost definition.  There was immediate release of cyst fluid from an anterior frontal area and then hemorrhagic tissue more posteriorly.  Circumferential microdissection was carried out after centrally debulking the tumor.  A right frontal pole lobectomy superomedially was completed and then further dissection with suction and bipolar cautery was used to remove all of the grossly abnormal tissue.  There was excellent decompression of the brain.  The lobectomy was carried down to the sylvian fissure skeletonizing the middle cerebral vessels and removing all abnormal subarachnoid tumor.  Some papaverine was placed in the sylvian fissure at the completion although there was not any gross spasm noted.   The tumor was sent for frozen/touch prep, as well as permanent section.? The brain surface was then layered with fibrillar Surgicel after achieving hemostasis with bipolar cautery where necessary.? Then dural margins were tacked up circumferentially and fibrillar Surgicel cigarettes were used.? Then the bone flap was replaced over a layer of Gelfoam and secured with the titanium cranial plating system.     The wound was irrigated copiously with antibiotic irrigation and then closed with 2-0 Vicryl for the galea and staples for the skin edges.? The ventriculostomy catheter was removed and the exit hole secured with 2-0 silk.  A full neurosurgical head dressing) was applied and the patient was taken to postanesthesia care unit in satisfactory condition with correct sponge and needle counts.

## 2022-09-23 NOTE — CONSULTS
[facility] - 4th Floor Medical Telemetry  Radiation Oncology  Consult Note    Patient Name: Michael Anthony  MRN: [id number]  Admission Date: 9/20/2022  Hospital Length of Stay: 3 days  Code Status: No Order   Attending Provider: Moses Hunter MD  Consulting Provider: JANE Miramontes; Dr Annie Lee  Primary Care Physician: No primary care provider on file.  Principal Problem:Brain mass    Consults  Subjective:     HPI: Patient is a 32 year old male who [place] [facility] with complaints of chronic headache over the last few months. Imaging at that time showed hemorrhagic mass and he was subsequently transferred to [facility] to be seen by Neurosurgery. He underwent craniotomy with excision and partial frontal lobectomy on 9/20/22. Surgical pathology confirmed GBM, grade 4. Radiation oncology and medical oncology were then consulted for evaluation.     Patient seen on rounds- he is awake and alert lying in bed with family member present. Surgical incision is healing well with staples in place. He is able to answer questions appropriately with only mild physical deficits noted. We discussed overall plan of care moving forward regarding radiotherapy- including tentative treatment plan as well as short and long term potential side effects. Patient and family voiced understanding.     Oncology Treatment Plan:   [No matching plan found]    Current Facility-Administered Medications   Medication    0.9%  NaCl infusion    acetaminophen tablet 650 mg    dexAMETHasone tablet 4 mg    dexmedetomidine (PRECEDEX) 400mcg/100mL 0.9% NaCL infusion    enoxaparin injection 40 mg    hydrALAZINE injection 10 mg    HYDROcodone-acetaminophen 5-325 mg per tablet 1 tablet    labetaloL injection 10 mg    levETIRAcetam tablet 500 mg    morphine injection 2 mg    ondansetron injection 4 mg       Review of patient's allergies indicates:  No Known Allergies     History reviewed. No pertinent past medical  history.  History reviewed. No pertinent surgical history.  Family History       Family history is unknown by patient.          Tobacco Use    Smoking status: Not on file    Smokeless tobacco: Not on file   Substance and Sexual Activity    Alcohol use: Not on file    Drug use: Not on file    Sexual activity: Not on file       Review of Systems   Constitutional:  Positive for activity change.   HENT:          Right sided facial drooping    Respiratory: Negative.     Cardiovascular: Negative.    Gastrointestinal: Negative.    Musculoskeletal:  Positive for gait problem.   Skin:         Craniotomy incision healing without issue   Neurological:  Positive for facial asymmetry and weakness.   Objective:     Vital Signs (Most Recent):  Temp: 98.5 °F (36.9 °C) (09/23/22 0750)  Pulse: 64 (09/23/22 0750)  Resp: 18 (09/23/22 0057)  BP: 137/85 (09/23/22 0750)  SpO2: 100 % (09/23/22 0750) Vital Signs (24h Range):  Temp:  [98 °F (36.7 °C)-98.5 °F (36.9 °C)] 98.5 °F (36.9 °C)  Pulse:  [52-75] 64  Resp:  [18-20] 18  SpO2:  [98 %-100 %] 100 %  BP: (112-153)/(48-85) 137/85     Weight: 63.2 kg (139 lb 5.3 oz)  Body mass index is 19.43 kg/m².  Body surface area is 1.78 meters squared.    Physical Exam  HENT:      Head:      Comments: Craniotomy incision with surgical incisions in place- khadra with no apparent drainage  Eyes:      Pupils: Pupils are equal, round, and reactive to light.      Comments: Right sided facial asymmetry    Cardiovascular:      Rate and Rhythm: Tachycardia present.   Pulmonary:      Effort: Pulmonary effort is normal.      Breath sounds: Normal breath sounds.   Abdominal:      General: Abdomen is flat. Bowel sounds are normal.   Skin:     General: Skin is warm and dry.   Neurological:      Mental Status: He is alert and oriented to person, place, and time.      Motor: Weakness present.      Gait: Gait abnormal.           Diagnostic Results:  MRI: No results found in the last 24 hours.    Assessment/Plan:   Plan of  care discussed in detail with Dr Lee prior to examination- plan for 6 weeks of radiation to surgical bed in conjunction with oral Temodar (per medical oncology)   We will plan for outpatient follow up in approximately 2 weeks to reassess clinical status and obtain CT simulation for treatment planning   All questions answered to the satisfaction of patient and family   Call with questions should they arise         Thank you for your consult.     JANE Miramontes  Radiation Oncology  [facility] - 4th Floor Medical Telemetry

## 2022-09-23 NOTE — DISCHARGE INSTRUCTIONS
Okay to shower and get incision wet.  Do not soak incision under water.  Do not apply any ointment/dressing to incision.  Leave incision open to air.

## 2022-09-23 NOTE — PROGRESS NOTES
POD 3. s/p right cranio for brain tumor     Patient is doing well.  Denies any headaches.  Reports he is moving around well without any issues  He does not have any focal deficits motor examination.    He is awake alert and oriented   There is some right temporal swelling as expected postoperatively.    Incision looks good.    PLAN  Okay for discharge home from Neurosurgery standpoint after patient was evaluated by med onc/rad onc  Continue Decadron 2 mg twice a day at home.  Decadron, Keppra, pain medicines sent to pharmacy downstairs.    He will follow up in the office at 7 days postop for staple removal.  Okay to shower and get incision wet.  Do not soak incision under water.  Do not apply any ointment/dressing to incision.  Leave incision open to air.

## 2022-09-24 VITALS
HEART RATE: 43 BPM | OXYGEN SATURATION: 97 % | DIASTOLIC BLOOD PRESSURE: 67 MMHG | RESPIRATION RATE: 18 BRPM | WEIGHT: 139.31 LBS | TEMPERATURE: 99 F | BODY MASS INDEX: 19.5 KG/M2 | HEIGHT: 71 IN | SYSTOLIC BLOOD PRESSURE: 136 MMHG

## 2022-09-24 PROCEDURE — 99222 1ST HOSP IP/OBS MODERATE 55: CPT | Mod: ,,, | Performed by: INTERNAL MEDICINE

## 2022-09-24 PROCEDURE — 25000003 PHARM REV CODE 250: Performed by: PHYSICIAN ASSISTANT

## 2022-09-24 PROCEDURE — 99222 PR INITIAL HOSPITAL CARE,LEVL II: ICD-10-PCS | Mod: ,,, | Performed by: INTERNAL MEDICINE

## 2022-09-24 PROCEDURE — 63600175 PHARM REV CODE 636 W HCPCS: Performed by: PHYSICIAN ASSISTANT

## 2022-09-24 RX ADMIN — DEXAMETHASONE 4 MG: 4 TABLET ORAL at 12:09

## 2022-09-24 RX ADMIN — DEXAMETHASONE 4 MG: 4 TABLET ORAL at 06:09

## 2022-09-24 RX ADMIN — LEVETIRACETAM 500 MG: 500 TABLET, FILM COATED ORAL at 08:09

## 2022-09-24 NOTE — PROGRESS NOTES
POD#5 s/p right crani, partial frontal lobectomy for excision of hemorrhagic mass  He is sitting up in bed time of rounds, NAD  He currently denies HA, N/V and blurred vision  No acute events overnight  Path results reveal high grade glioma  Patient is ready to go home    AFVSS  PERRL, EOMI  Alex well, no deficits appreciated  Incision c/d/I  Staples intact  Right periorbital swelling much improved    Plan:   Patient seen by Dr. Sauceda this am and started on radiation tx  [facility]  He is stable for dc from our standpoint  F/u with Dr. Fowler scheduled for staple removal

## 2022-09-24 NOTE — PROGRESS NOTES
[facility]  Hospital Medicine Progress Note        Chief Complaint: Inpatient Follow-up for brain mass    HPI:   32-year-old male who presented to the ER at [facility] on 09/20/22 with complaints of chronic headache over the last few months and had imaging that showed a hemorrhagic brain mass.  He was transferred to this facility and seen by Neurosurgery for which she underwent craniotomy with neoplasm excision and partial frontal lobectomy 09/20/2022.  He was monitored overnight in the ICU and is being downgraded to the floor for further management.  At bedside he has no complaints and is tolerating an oral diet.  Neurology suspect this is GBM.    Interval Hx:  Seen and examined the apt. He does not have any neurological deficits, healing well. Pending Hem-onc eval.     Objective/physical exam:  General: In no acute distress, afebrile  Chest: Clear to auscultation bilaterally  Heart: RRR, +S1, S2, no appreciable murmur  Abdomen: Soft, nontender, BS +  MSK: Warm, no lower extremity edema, no clubbing or cyanosis  Neurologic: Alert and oriented x4, Cranial nerve II-XII intact, Strength 5/5 in all 4 extremities    VITAL SIGNS: 24 HRS MIN & MAX LAST   Temp  Min: 97.8 °F (36.6 °C)  Max: 99.4 °F (37.4 °C) 99.4 °F (37.4 °C)   BP  Min: 112/69  Max: 146/75 136/67   Pulse  Min: 42  Max: 62  (!) 43     Resp  Min: 18  Max: 20 18   SpO2  Min: 97 %  Max: 100 % 97 %       Recent Labs   Lab 09/20/22  0610 09/21/22  0148 09/22/22  0149   WBC 15.5* 17.9* 16.3*   RBC 5.02 4.32* 4.53*   HGB 13.8* 11.8* 12.4*   HCT 42.8 36.8* 39.2*   MCV 85.3 85.2 86.5   MCH 27.5 27.3 27.4   MCHC 32.2* 32.1* 31.6*   RDW 12.9 13.0 13.0   * 417* 397   MPV 10.8* 10.1 10.4         Recent Labs   Lab 09/20/22  0610 09/20/22  1451 09/20/22  1812 09/21/22  0148 09/21/22  0552 09/22/22  0149     --   --  144  --  141   K 4.3  --   --  3.7  --  3.8   CO2 23  --   --  25  --  24   BUN 12.5  --   --  14.1  --  10.7   CREATININE  0.78  --   --  0.94  --  0.83   CALCIUM 10.4*  --   --  9.5  --  9.7   PH  --  7.50* 7.51*  --  7.46*  --    ALBUMIN 4.4  --   --   --   --  3.7   ALKPHOS 49  --   --   --   --  45   ALT 11  --   --   --   --  12   AST 16  --   --   --   --  29   BILITOT 1.0  --   --   --   --  1.5            Microbiology Results (last 7 days)       ** No results found for the last 168 hours. **             See below for Radiology    Scheduled Med:   dexAMETHasone  4 mg Oral Q6H    enoxaparin  40 mg Subcutaneous Daily    levETIRAcetam  500 mg Oral BID        Continuous Infusions:   sodium chloride 0.9% 75 mL/hr at 09/22/22 1719    dexmedetomidine (PRECEDEX) infusion Stopped (09/21/22 0400)        PRN Meds:  acetaminophen, hydrALAZINE, HYDROcodone-acetaminophen, labetaloL, morphine, ondansetron       Assessment/Plan:   Hemorrhagic brain mass, likely GBM, status post craniotomy/excision 9/20     Path showed grade 4 glioma, pending onc evaluation.   he does have a significant leukocytosis but is also on dexamethasone.    continue Keppra for seizure prophylaxis in the setting of brain mass status post resection.    neurosurgery following along.    plan to consult Oncology once we have some pathology back.  continue PT and OT.    Patient condition:  Stable    Anticipated discharge and Disposition: TBD      All diagnosis and differential diagnosis have been reviewed; assessment and plan has been documented; I have personally reviewed the labs and test results that are presently available; I have reviewed the patients medication list; I have reviewed the consulting providers response and recommendations. I have reviewed or attempted to review medical records based upon their availability    All of the patient's questions have been  addressed and answered. Patient's is agreeable to the above stated plan. I will continue to monitor closely and make adjustments to medical management as  needed.  _____________________________________________________________________      Radiology:  MRI BRAIN W WO CONTRAST  Narrative: EXAMINATION:  MRI BRAIN W WO CONTRAST    CLINICAL HISTORY:  brain mass, s/p resection;    TECHNIQUE:  Multiplanar multisequence MR imaging of the brain was performed without and with contrast.    COMPARISON:  CT scan from 09/20/2022 and 09/21/2022    FINDINGS:  The patient has undergone recent craniotomy for resection of a large mass in the right frontal lobe.  There is air seen in the surgical site and a subdural hematoma seen in the right frontal parietal region.  Maximum dimension is subdural hematoma is 12 mm.  Subdural blood was seen on the recent CT scan as well but appears slightly more prominent.  There is fluid seen in the surgical site consistent with recent surgery.  Some hemorrhage is also seen at the surgical site.  There is some significant edema seen with mass effect of on the left lateral ventricle and shift of the midline to the left by proximally 15 mm.  There is blood seen within the lateral ventricle on the right side.  There is blood seen along what appears to be a ventriculostomy tract on the left side..    There is some persistent enhancement seen along the margin of the surgical bed suggesting residual mass.  Impression: Findings seen consistent with recent craniotomy in the right frontal region for resection of a hemorrhagic mass with some persistent enhancement seen along the mesial margin of the surgical bed suggesting some residual mass.    Fluid seen in the surgical bed with subdural hematoma seen on the right side as well with air seen at the craniotomy site.    Midline shift from right to left    Intraventricular hemorrhage seen in the right lateral ventricle    Electronically signed by: Lokesh Mason  Date:    09/21/2022  Time:    16:05  CT Head Without Contrast  EXAMINATION  CT HEAD WITHOUT CONTRAST    CLINICAL HISTORY  Craniotomy, post-op;  follow-up    TECHNIQUE  Axial non-contrast CT images of the head were acquired and multiplanar reconstructions accomplished by a CT technologist at a separate workstation, pushed to PACS for physician review.    COMPARISON  20 September 2022    FINDINGS  Images were reviewed in subdural, brain, soft tissue, and bone windows.    Exam quality: Motion/streak artifact limits assessment of the posterior fossa.    Interval changes of right frontoparietal craniotomy are noted, with prominent volume of residual intracranial air.  Changes consistent with excision/evacuation of the large mixed density intraparenchymal hemorrhagic lesion are also noted, with small amount of residual surgical bed extra-axial hemorrhage and newly visualized hyperdense blood within the right lateral ventricle.  There is linear hyperdensity extending through the left frontal lobe parenchyma into the ventricular system consistent with ventriculostomy tubing tract; no remaining tubing component is visualized at this time.    There is markedly improved mass effect, with approximately 11 mm residual leftward midline shift.  No new or worsening sites of intracranial hemorrhage or other focal abnormality appreciated.  Effacement of the right lateral ventricle is improved.  There is similar dilated appearance of the posterior left ventricular components.  Basal cisterns are preserved.    IMPRESSION  1. Postoperative changes with improved regional mass effect and midline shift.  2. Small amount of residual extra-axial hemorrhage through the surgical bed, as well as newly visualized postoperative intraventricular blood.  3. Findings suggestive of small volume blood within left frontal approach ventriculostomy tract.    RADIATION DOSE  Automated tube current modulation, weight-based exposure dosing, and/or iterative reconstruction technique utilized to reach lowest reasonably achievable exposure rate.    DLP: 2381 mGy*cm    Electronically signed by: Iftikhar  Bridgette  Date:    09/21/2022  Time:    06:57      Olvin Villavicencio MD   09/24/2022

## 2022-09-24 NOTE — PLAN OF CARE
Problem: Adult Inpatient Plan of Care  Goal: Plan of Care Review  Outcome: Ongoing, Progressing  Goal: Patient-Specific Goal (Individualized)  Outcome: Ongoing, Progressing  Goal: Absence of Hospital-Acquired Illness or Injury  Outcome: Ongoing, Progressing  Goal: Optimal Comfort and Wellbeing  Outcome: Ongoing, Progressing  Goal: Readiness for Transition of Care  Outcome: Ongoing, Progressing     Problem: Infection  Goal: Absence of Infection Signs and Symptoms  Outcome: Ongoing, Progressing     Problem: Ventilator-Induced Lung Injury (Mechanical Ventilation, Invasive)  Goal: Absence of Ventilator-Induced Lung Injury  Outcome: Ongoing, Progressing

## 2022-09-24 NOTE — CONSULTS
[facility][1] General - Oncology Acute  Hematology/Oncology  Consult Note    Patient Name: Michael Anthony  MRN: [id number]  Admission Date: 9/20/2022  Hospital Length of Stay: 4 days  Attending Provider: Moses Hunter MD  Consulting Provider: Ramon Sauceda MD  Principal Problem:Brain mass    Inpatient consult to Oncology  Consult performed by: Ramon Sauceda MD  Consult ordered by: Duglas Fowler MD      Subjective:     HPI:  32-year-old black male with no significant past medical history presented to the [facility] ER 9/20/22 with headaches and intractable nausea and vomiting.  CT of the head showed a large right frontal lobe parenchymal hemorrhage with surrounding edema and mass effect.  He was transferred to [facility] for neurosurgery evaluation.  He underwent a right frontoparietal craniotomy with near total resection of a hemorrhagic malignant glioma and placement of a right frontal ventriculostomy.  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  Postoperative MRI showed significant improvement post resection with some persistent enhancement along the medial margin of the surgical bed.  Medical oncology and radiation oncology were consulted to arrange adjuvant therapy.    Patient is awake and alert at the time of my evaluation, his father is at the bedside.  He denies any headaches or blurry vision.  No history of seizures.  No focal motor weakness or ataxia.      Medications:  Continuous Infusions:   sodium chloride 0.9% 75 mL/hr at 09/22/22 1719    dexmedetomidine (PRECEDEX) infusion Stopped (09/21/22 0400)     Scheduled Meds:   dexAMETHasone  4 mg Oral Q6H    enoxaparin  40 mg Subcutaneous Daily    levETIRAcetam  500 mg Oral BID     PRN Meds:acetaminophen, hydrALAZINE, HYDROcodone-acetaminophen, labetaloL, morphine, ondansetron     Review of patient's allergies indicates:  No Known Allergies     History reviewed. No pertinent past medical history.  History  reviewed. No pertinent surgical history.  Family History       Family history is unknown by patient.          Tobacco Use    Smoking status: Not on file    Smokeless tobacco: Not on file   Substance and Sexual Activity    Alcohol use: Not on file    Drug use: Not on file    Sexual activity: Not on file       Review of Systems   Constitutional:  Negative for activity change, appetite change, fatigue and fever.   HENT:  Negative for hearing loss, mouth sores, sore throat and trouble swallowing.    Eyes: Negative.    Respiratory:  Negative for cough, shortness of breath and wheezing.    Cardiovascular:  Negative for chest pain, palpitations and leg swelling.   Gastrointestinal:  Negative for abdominal pain, blood in stool, constipation, diarrhea, nausea and vomiting.   Genitourinary:  Negative for dysuria, frequency, hematuria and urgency.   Musculoskeletal:  Negative for arthralgias and back pain.   Skin:  Negative for pallor and rash.   Neurological:  Negative for dizziness, weakness, numbness and headaches.   Hematological:  Negative for adenopathy. Does not bruise/bleed easily.   Psychiatric/Behavioral: Negative.       Objective:     Vital Signs (Most Recent):  Temp: 99.4 °F (37.4 °C) (09/24/22 0752)  Pulse: (!) 43 (09/24/22 0752)  Resp: 18 (09/24/22 0752)  BP: 136/67 (09/24/22 0752)  SpO2: 97 % (09/24/22 0752)   Vital Signs (24h Range):  Temp:  [97.4 °F (36.3 °C)-99.4 °F (37.4 °C)] 99.4 °F (37.4 °C)  Pulse:  [42-62] 43  Resp:  [18-20] 18  SpO2:  [97 %-100 %] 97 %  BP: (112-146)/(53-75) 136/67     Weight: 63.2 kg (139 lb 5.3 oz)  Body mass index is 19.43 kg/m².  Body surface area is 1.78 meters squared.      Physical Exam  Constitutional:       Appearance: Normal appearance.   HENT:      Head: Normocephalic.      Comments: Right frontoparietal craniotomy incision with staples in place.  No erythema or drainage.     Nose: Nose normal.      Mouth/Throat:      Mouth: Mucous membranes are moist.      Pharynx:  Oropharynx is clear. No posterior oropharyngeal erythema.   Eyes:      Extraocular Movements: Extraocular movements intact.      Conjunctiva/sclera: Conjunctivae normal.      Pupils: Pupils are equal, round, and reactive to light.   Cardiovascular:      Rate and Rhythm: Normal rate and regular rhythm.      Heart sounds: No murmur heard.  Pulmonary:      Comments: Lungs clear to auscultation  Abdominal:      General: Abdomen is flat. Bowel sounds are normal. There is no distension.      Palpations: Abdomen is soft. There is no mass.      Tenderness: There is no abdominal tenderness.   Musculoskeletal:         General: No swelling or tenderness. Normal range of motion.      Cervical back: Normal range of motion and neck supple. No tenderness.   Lymphadenopathy:      Cervical: No cervical adenopathy.   Skin:     General: Skin is warm and dry.      Findings: No lesion or rash.   Neurological:      General: No focal deficit present.      Mental Status: He is alert and oriented to person, place, and time.      Cranial Nerves: No cranial nerve deficit.      Motor: No weakness.      Gait: Gait normal.   Psychiatric:         Mood and Affect: Mood normal.         Behavior: Behavior normal.       Significant Labs:    Latest Reference Range & Units 09/22/22 01:49   WBC 4.5 - 11.5 x10(3)/mcL 16.3 (H)   RBC 4.70 - 6.10 x10(6)/mcL 4.53 (L)   Hemoglobin 14.0 - 18.0 gm/dL 12.4 (L)   Hematocrit 42.0 - 52.0 % 39.2 (L)   MCV 80.0 - 94.0 fL 86.5   MCH 27.0 - 31.0 pg 27.4   MCHC 33.0 - 36.0 mg/dL 31.6 (L)   RDW 11.5 - 17.0 % 13.0   Platelets 130 - 400 x10(3)/mcL 397   MPV 7.4 - 10.4 fL 10.4   Neut % % 85.7   LYMPH % % 5.2   Mono % % 8.6      Latest Reference Range & Units 09/22/22 01:49   Sodium 136 - 145 mmol/L 141   Potassium 3.5 - 5.1 mmol/L 3.8   Chloride 98 - 107 mmol/L 108 (H)   CO2 22 - 29 mmol/L 24   BUN 8.9 - 20.6 mg/dL 10.7   Creatinine 0.73 - 1.18 mg/dL 0.83   eGFR mls/min/1.73/m2 >60   Glucose 74 - 100 mg/dL 121 (H)   Calcium  8.4 - 10.2 mg/dL 9.7   Alkaline Phosphatase 40 - 150 unit/L 45   PROTEIN TOTAL 6.4 - 8.3 gm/dL 7.3   Albumin 3.5 - 5.0 gm/dL 3.7   Albumin/Globulin Ratio 1.1 - 2.0 ratio 1.0 (L)   BILIRUBIN TOTAL <=1.5 mg/dL 1.5   AST 5 - 34 unit/L 29   ALT 0 - 55 unit/L 12   Globulin, Total 2.4 - 3.5 gm/dL 3.6 (H)       Diagnostic Results:  CT and MRI images reviewed by me personally.    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  Adjuvant treatment recommended with radiation therapy in combination with oral Temodar 75 mg/m2 daily until completion of radiation.  Benefits, risks, toxicities and side effects of Temodar were discussed and reviewed in detail. All questions were answered. Literature regarding the treatment plan and specific drug information was also provided.   Will arrange outpatient follow-up at [facility] for treatment planning.  He is stable for discharge from a medical oncology standpoint.    Thank you for the consult.       ANASTACIO SIMPSON MD  Hematology/Oncology

## 2022-09-24 NOTE — DISCHARGE SUMMARY
[facility] Hospital Medicine Discharge Summary    Admit Date: 9/20/2022  Discharge Date and Time: 9/24/2022  Discharging Physician: Olvin Villavicencio MD.  Consults: Hematology/Oncology and Neurosurgery    Discharge Diagnoses:  Hemorrhagic brain mass, likely GBM, status post craniotomy/excision 9/20    Hospital Course:   32-year-old male who presented to the ER at [facility] on 09/20/22 with complaints of chronic headache over the last few months and had imaging that showed a hemorrhagic brain mass.  He was transferred to this facility and seen by Neurosurgery for which she underwent craniotomy with neoplasm excision and partial frontal lobectomy 09/20/2022.  He was monitored overnight in the ICU and is being downgraded to the floor for further management.  At bedside he has no complaints and is tolerating an oral diet.  Neurology suspect this is GBM.    Path showed grade 4 glioma, pending onc evaluation.   he does have a significant leukocytosis but is also on dexamethasone.    continue Keppra for seizure prophylaxis in the setting of brain mass status post resection.    neurosurgery following along.      Unfortunately I was not able to see the patient examined the patient.  Patient seen by Neurosurgery and they cleared the patient Q scripts and told the patient that he can go home and patient was sent home before my knowledge and before my examination.  Sign I am placing this order of discharge and writing this note due to system requirements however I have not physically discharge the patient and seen him today.    Vitals:  VITAL SIGNS: 24 HRS MIN & MAX LAST   Temp  Min: 97.8 °F (36.6 °C)  Max: 99.4 °F (37.4 °C) 99.4 °F (37.4 °C)   BP  Min: 112/69  Max: 146/75 136/67   Pulse  Min: 42  Max: 62  (!) 43     Resp  Min: 18  Max: 20 18   SpO2  Min: 97 %  Max: 100 % 97 %         Procedures Performed: No admission procedures for hospital encounter.     Significant Diagnostic Studies: See Full reports for  all details    Recent Labs   Lab 09/20/22  0610 09/21/22  0148 09/22/22 0149   WBC 15.5* 17.9* 16.3*   RBC 5.02 4.32* 4.53*   HGB 13.8* 11.8* 12.4*   HCT 42.8 36.8* 39.2*   MCV 85.3 85.2 86.5   MCH 27.5 27.3 27.4   MCHC 32.2* 32.1* 31.6*   RDW 12.9 13.0 13.0   * 417* 397   MPV 10.8* 10.1 10.4       Recent Labs   Lab 09/20/22  0610 09/20/22  1451 09/20/22  1812 09/21/22 0148 09/21/22  0552 09/22/22 0149     --   --  144  --  141   K 4.3  --   --  3.7  --  3.8   CO2 23  --   --  25  --  24   BUN 12.5  --   --  14.1  --  10.7   CREATININE 0.78  --   --  0.94  --  0.83   CALCIUM 10.4*  --   --  9.5  --  9.7   PH  --  7.50* 7.51*  --  7.46*  --    ALBUMIN 4.4  --   --   --   --  3.7   ALKPHOS 49  --   --   --   --  45   ALT 11  --   --   --   --  12   AST 16  --   --   --   --  29   BILITOT 1.0  --   --   --   --  1.5        Microbiology Results (last 7 days)       ** No results found for the last 168 hours. **             MRI BRAIN W WO CONTRAST  Narrative: EXAMINATION:  MRI BRAIN W WO CONTRAST    CLINICAL HISTORY:  brain mass, s/p resection;    TECHNIQUE:  Multiplanar multisequence MR imaging of the brain was performed without and with contrast.    COMPARISON:  CT scan from 09/20/2022 and 09/21/2022    FINDINGS:  The patient has undergone recent craniotomy for resection of a large mass in the right frontal lobe.  There is air seen in the surgical site and a subdural hematoma seen in the right frontal parietal region.  Maximum dimension is subdural hematoma is 12 mm.  Subdural blood was seen on the recent CT scan as well but appears slightly more prominent.  There is fluid seen in the surgical site consistent with recent surgery.  Some hemorrhage is also seen at the surgical site.  There is some significant edema seen with mass effect of on the left lateral ventricle and shift of the midline to the left by proximally 15 mm.  There is blood seen within the lateral ventricle on the right side.  There is  blood seen along what appears to be a ventriculostomy tract on the left side..    There is some persistent enhancement seen along the margin of the surgical bed suggesting residual mass.  Impression: Findings seen consistent with recent craniotomy in the right frontal region for resection of a hemorrhagic mass with some persistent enhancement seen along the mesial margin of the surgical bed suggesting some residual mass.    Fluid seen in the surgical bed with subdural hematoma seen on the right side as well with air seen at the craniotomy site.    Midline shift from right to left    Intraventricular hemorrhage seen in the right lateral ventricle    Electronically signed by: Lokesh Mason  Date:    09/21/2022  Time:    16:05  CT Head Without Contrast  EXAMINATION  CT HEAD WITHOUT CONTRAST    CLINICAL HISTORY  Craniotomy, post-op; follow-up    TECHNIQUE  Axial non-contrast CT images of the head were acquired and multiplanar reconstructions accomplished by a CT technologist at a separate workstation, pushed to PACS for physician review.    COMPARISON  20 September 2022    FINDINGS  Images were reviewed in subdural, brain, soft tissue, and bone windows.    Exam quality: Motion/streak artifact limits assessment of the posterior fossa.    Interval changes of right frontoparietal craniotomy are noted, with prominent volume of residual intracranial air.  Changes consistent with excision/evacuation of the large mixed density intraparenchymal hemorrhagic lesion are also noted, with small amount of residual surgical bed extra-axial hemorrhage and newly visualized hyperdense blood within the right lateral ventricle.  There is linear hyperdensity extending through the left frontal lobe parenchyma into the ventricular system consistent with ventriculostomy tubing tract; no remaining tubing component is visualized at this time.    There is markedly improved mass effect, with approximately 11 mm residual leftward midline  shift.  No new or worsening sites of intracranial hemorrhage or other focal abnormality appreciated.  Effacement of the right lateral ventricle is improved.  There is similar dilated appearance of the posterior left ventricular components.  Basal cisterns are preserved.    IMPRESSION  1. Postoperative changes with improved regional mass effect and midline shift.  2. Small amount of residual extra-axial hemorrhage through the surgical bed, as well as newly visualized postoperative intraventricular blood.  3. Findings suggestive of small volume blood within left frontal approach ventriculostomy tract.    RADIATION DOSE  Automated tube current modulation, weight-based exposure dosing, and/or iterative reconstruction technique utilized to reach lowest reasonably achievable exposure rate.    DLP: 2381 mGy*cm    Electronically signed by: Iftikhar Angeles  Date:    09/21/2022  Time:    06:57         Medication List        START taking these medications      dexAMETHasone 2 MG tablet  Commonly known as: DECADRON  Take 1 tablet (2 mg total) by mouth 2 (two) times a day.     HYDROcodone-acetaminophen 5-325 mg per tablet  Commonly known as: NORCO  Take 1 tablet by mouth every 6 (six) hours as needed for Pain.     levETIRAcetam 500 MG Tab  Commonly known as: KEPPRA  Take 1 tablet (500 mg total) by mouth 2 (two) times daily.               Where to Get Your Medications        These medications were sent to [facility] - [address]  [address] [place] [address] [facility] [address]      Phone: [phone]   dexAMETHasone 2 MG tablet  HYDROcodone-acetaminophen 5-325 mg per tablet  levETIRAcetam 500 MG Tab          Explained in detail to the patient about the discharge plan, medications, and follow-up visits. Pt understands and agrees with the treatment plan  Discharge Disposition:     Discharged Condition: stable  Diet-   Dietary Orders (From admission, onward)        Start     Ordered    09/22/22 0719  Diet Adult Regular  Diet effective 0500         09/22/22 0718    09/21/22 1743  Dietary nutrition supplements Prosource No Carb; TID  Continuous        Question Answer Comment   Select PO Supplement: Prosource No Carb    Frequency: TID        09/21/22 1743                   Medications Per DC med rec  Activities as tolerated    For further questions contact hospitalist office    Discharge time 33 minutes    For worsening symptoms, chest pain, shortness of breath, increased abdominal pain, high grade fever, stroke or stroke like symptoms, immediately go to the nearest Emergency Room or call 911 as soon as possible.      Olvin Melgar M.D, on 9/24/2022. at 4:18 PM.

## 2022-09-24 NOTE — NURSING
All consults have discharged patient . Spoke with Md. 9/23/22 and was given a verbal order to discharge patient after Oncology and radiation oncology saw patient. Notified Md. That oncology was called 2 times and still has not rounded on patient. Md. Stated that we would wait to discharge in a.m. after oncology sees patient. Oncologist Dr. Sauceda did see the patient this morning and stated that the patient was ok to discharge.

## 2022-09-26 ENCOUNTER — PATIENT OUTREACH (OUTPATIENT)
Dept: ADMINISTRATIVE | Facility: CLINIC | Age: 32
End: 2022-09-26
Payer: COMMERCIAL

## 2022-09-26 NOTE — PROGRESS NOTES
C3 nurse spoke with Michael Anthony  for a TCC post hospital discharge follow up call. The patient has a scheduled HOSFU appointment with Dr. Fowler on 09/27/2022 @ 0930 am.

## 2022-09-27 ENCOUNTER — CLINICAL SUPPORT (OUTPATIENT)
Dept: NEUROSURGERY | Facility: CLINIC | Age: 32
End: 2022-09-27
Payer: COMMERCIAL

## 2022-09-27 DIAGNOSIS — G93.89 BRAIN MASS: Primary | ICD-10-CM

## 2022-09-27 NOTE — PROGRESS NOTES
[facility]  Neurosurgery    CHIEF COMPLAINT:    Post-operative wound check/staple removal    HPI:    Michael Anthony is a 32 y.o.-year-old male who presents today for staple removal.  He is s/p right crani, partial frontal lobectomy for excision of hemorrhagic mass that was done on 9/20/22.  He denies fevers, chills, night sweats or N/V.      Review of patient's allergies indicates:  No Known Allergies    Current Outpatient Medications   Medication Sig Dispense Refill    dexAMETHasone (DECADRON) 2 MG tablet Take 1 tablet (2 mg total) by mouth 2 (two) times a day. 60 tablet 1    HYDROcodone-acetaminophen (NORCO) 5-325 mg per tablet Take 1 tablet by mouth every 6 (six) hours as needed for Pain. 28 tablet 0    levETIRAcetam (KEPPRA) 500 MG Tab Take 1 tablet (500 mg total) by mouth 2 (two) times daily. 60 tablet 11     No current facility-administered medications for this visit.       No past medical history on file.  No past surgical history on file.  Family History       Family history is unknown by patient.          Social History     Socioeconomic History    Marital status: Unknown         Physical Exam:    General: well developed, well nourished, no distress  Neurologic: Alert and oriented. Thought content appropriate.   Cranial nerves: face symmetric, pupils equal, round, reactive to light with accomodation, EOMI.   Motor Strength: moves all extremities with good strength and tone      Scalp  incision:  C/D/I  Wound edges well-approximated  Staples intact, removed without difficulty    Gait:  normal        Assessment/Plan:     -Keep incision open to air   -Can shower and get incision wet, just pat dry and no vigorous scrubbing. Do not submerge incision for another 2 weeks.   -He is to follow up with Dr. Sauceda as instructed.  -Encouraged patient to call if they have any questions or concerns prior to next follow up appt      Shante Bender LPN

## 2022-09-29 ENCOUNTER — TELEPHONE (OUTPATIENT)
Dept: HEMATOLOGY/ONCOLOGY | Facility: CLINIC | Age: 32
End: 2022-09-29
Payer: COMMERCIAL

## 2022-09-29 NOTE — TELEPHONE ENCOUNTER
Patient will be a N.I. patient, but Dr. Sauceda saw him in the hospital and filled out paperwork for work leave. He states that the company, [employer], faxed over a document asking for more information. He is calling to check on the status of this. He can be reached @ [phone].

## 2022-10-04 NOTE — PROGRESS NOTES
Referring provider: Dr. Sauceda  Reason for consult:  High-grade glioma     Patient is a 32-year-old with no past medical history who presented to the ER at [facility] on 09/20/2022 with symptoms of nausea, vomiting and headaches.  He had a CT of his head done in the ER which showed a right frontal lobe mass concerning for malignancy.  He was transferred to [facility] for neurosurgical consultation.  He underwent a right frontoparietal craniotomy with near total resection of the frontal lobe mass, pathology from which revealed high-grade glioma pending further classification.  Patient was discharged from hospital soon after his resection without any complications.    He presented to clinic on 10/05/2022 to establish care to discuss further disease management.      Today, 10/05/2022, patient denies any acute concerns.  Particularly he denies any headaches, vision changes, tingling numbness, focal weakness, dizziness, nausea or vomiting.  He reports a fair appetite.  Patient remains compliant with his dexamethasone as prescribed by his neurosurgeon.      ECOG 0, worked in [employer] prior to his presentation, lives with his father, denies any family history of malignancy.  Former smoker, quit for many years, denies alcohol or recreational drug use.    Pathology   09/20/2022:  Right frontal hemorrhagic mass resection-high-grade glioma, grade 4 pending ID analysis.    Imaging   09/21/2022 MRI brain with and without contrast:  Findings consistent with recent craniotomy in the right frontal region for resection of a hemorrhagic mass with some persistent enhancement seen along with the mesial margin of the surgical bed suggesting some residual mass.  Fluid seen in the surgical bed with subdural hematoma seen on the right side as well as with air seen on craniotomy site.  Midline shift from right to left, intraventricular hemorrhage seen in the right lateral ventricle.      Past Medical History:   Diagnosis Date     Brain cancer        Past Surgical History:   Procedure Laterality Date    CRANIOTOMY FOR EXCISION OF INTRACRANIAL TUMOR         Family History   Family history unknown: Yes       Social History     Socioeconomic History    Marital status: Unknown   Tobacco Use    Smoking status: Never    Smokeless tobacco: Never   Substance and Sexual Activity    Alcohol use: Never    Drug use: Never       Current Outpatient Medications   Medication Sig Dispense Refill    dexAMETHasone (DECADRON) 2 MG tablet Take 1 tablet (2 mg total) by mouth 2 (two) times a day. 60 tablet 1    HYDROcodone-acetaminophen (NORCO) 5-325 mg per tablet Take 1 tablet by mouth every 6 (six) hours as needed for Pain. 28 tablet 0    levETIRAcetam (KEPPRA) 500 MG Tab Take 1 tablet (500 mg total) by mouth 2 (two) times daily. 60 tablet 11     No current facility-administered medications for this visit.       Review of patient's allergies indicates:  No Known Allergies    Review of system  CONSTITUTIONAL: no fevers, no chills, no weight loss, no fatigue, no weakness  HEMATOLOGIC: no abnormal bleeding, no abnormal bruising, no drenching night sweats  ONCOLOGIC: no new masses or lumps  HEENT: no vision loss, no tinnitus or hearing loss, no nose bleeding, no dysphagia, no odynophagia  CVS: no chest pain, no palpitations, no dyspnea on exertion  RESP: no shortness of breath, no hemoptysis, no cough  GI: no nausea, no vomiting, no diarrhea, no constipation, no melena, no hematochezia, no hematemesis, no abdominal pain, no increase in abdominal girth  : no dysuria, no hematuria, no hesitancy, no scrotal swelling, no discharge  INTEGUMENT: no rashes, no abnormal bruising, no nail pitting, no hyperpigmentation  NEURO: no falls, no memory loss, no paresthesias or dysesthesias, no urofecal incontinence or retention, no loss of strength on any extremity  MSK: no back pain, no new joint pain, no joint swelling  PSYCH: no suicidal or homicidal ideation, no depression, no  insomnia, no anhedonia  ENDOCRINE: no heat or cold intolerance, no polyuria, no polydipsia      Physical Exam:  Vitals:    10/05/22 1105   BP: (!) 152/92   Pulse: 64   Resp: 18   Temp: 98.2 °F (36.8 °C)       ECOG PS 0  GA: AAOx3, NAD  HEENT: NCAT, PERRLA, EOMI, good dentition, moist oral mucous membranes, well-healed incision over the right frontal region  LYMPH: no cervical, axillary or supraclavicular adenopathy  CVS: s1s2 RRR, no M/R/G  RESP: CTA b/l, no crackles, no wheezes or rhonchi  ABD: soft, NT, ND, BS+, no hepatosplenomegaly  EXT: no deformities, no pedal edema  SKIN: no rashes, warm and dry  NEURO: normal mentation, strength 5/5 on all 4 extremities, no sensory deficits, cranial nerves 2-12 grossly intact      Assessment and plan    # High-grade glioma, WHO grade 4, IDH mutation status pending  Status post gross total resection on 09/20/2022   Reviewed pathology report, noted IDH mutation status pending   Discussed with patient and his father the diagnosis, aggressive nature of disease, poor prognosis and treatment recommendations including adjuvant chemoradiation followed by chemotherapy with Temodar.    Discussed the option to explore clinical trials given patient's young age and good ECOG  Will request pathology to obtain MGMT methylation status on patient's pathology in addition to IDH mutation   Referred to Radiation Oncology   Will send prescription for Temodar 75 mg per m2 days 1-5 concurrently with radiation for insurance authorization  Patient was advised to hold off on starting Temodar until the start of radiation treatments and after follow-up with me.        Plan   Referred to Radiation Oncology   Request pathology to add mg MT methylation status to patient's pending report follow-up on IDH mutation status   Temodar 75 mg per m2 days 1-5 concurrently with radiation, prescription sent to pharmacy today   Plan to follow-up in 10 days with CBC and CMP to discuss radiation oncology  recommendations pathology report prior to initiating Temodar.    A total of  60 minutes were spent in review of records, interpretation of test, coordination of care, discussion and counseling with the patient.          Portions of the record may have been created with voice recognition software. Occasional wrong-word or sound-a-like substitutions may have occurred due to the inherent limitations of voice recognition software. Read the chart carefully and recognize, using context, where substitutions have occurred.

## 2022-10-05 ENCOUNTER — OFFICE VISIT (OUTPATIENT)
Dept: HEMATOLOGY/ONCOLOGY | Facility: CLINIC | Age: 32
End: 2022-10-05
Payer: COMMERCIAL

## 2022-10-05 ENCOUNTER — TELEPHONE (OUTPATIENT)
Dept: NEUROSURGERY | Facility: CLINIC | Age: 32
End: 2022-10-05
Payer: COMMERCIAL

## 2022-10-05 VITALS
DIASTOLIC BLOOD PRESSURE: 92 MMHG | BODY MASS INDEX: 19.88 KG/M2 | RESPIRATION RATE: 18 BRPM | TEMPERATURE: 98 F | HEART RATE: 64 BPM | SYSTOLIC BLOOD PRESSURE: 152 MMHG | OXYGEN SATURATION: 99 % | HEIGHT: 71 IN | WEIGHT: 142 LBS

## 2022-10-05 DIAGNOSIS — C71.9 HIGH GRADE GLIOMA NOT CLASSIFIABLE BY WHO CRITERIA: ICD-10-CM

## 2022-10-05 DIAGNOSIS — G93.89 BRAIN MASS: Primary | ICD-10-CM

## 2022-10-05 PROCEDURE — 1159F MED LIST DOCD IN RCRD: CPT | Mod: CPTII,,, | Performed by: STUDENT IN AN ORGANIZED HEALTH CARE EDUCATION/TRAINING PROGRAM

## 2022-10-05 PROCEDURE — 1159F PR MEDICATION LIST DOCUMENTED IN MEDICAL RECORD: ICD-10-PCS | Mod: CPTII,,, | Performed by: STUDENT IN AN ORGANIZED HEALTH CARE EDUCATION/TRAINING PROGRAM

## 2022-10-05 PROCEDURE — 99215 OFFICE O/P EST HI 40 MIN: CPT | Mod: ,,, | Performed by: STUDENT IN AN ORGANIZED HEALTH CARE EDUCATION/TRAINING PROGRAM

## 2022-10-05 PROCEDURE — 3077F SYST BP >= 140 MM HG: CPT | Mod: CPTII,,, | Performed by: STUDENT IN AN ORGANIZED HEALTH CARE EDUCATION/TRAINING PROGRAM

## 2022-10-05 PROCEDURE — 1111F DSCHRG MED/CURRENT MED MERGE: CPT | Mod: CPTII,,, | Performed by: STUDENT IN AN ORGANIZED HEALTH CARE EDUCATION/TRAINING PROGRAM

## 2022-10-05 PROCEDURE — 3008F PR BODY MASS INDEX (BMI) DOCUMENTED: ICD-10-PCS | Mod: CPTII,,, | Performed by: STUDENT IN AN ORGANIZED HEALTH CARE EDUCATION/TRAINING PROGRAM

## 2022-10-05 PROCEDURE — 1111F PR DISCHARGE MEDS RECONCILED W/ CURRENT OUTPATIENT MED LIST: ICD-10-PCS | Mod: CPTII,,, | Performed by: STUDENT IN AN ORGANIZED HEALTH CARE EDUCATION/TRAINING PROGRAM

## 2022-10-05 PROCEDURE — 3080F PR MOST RECENT DIASTOLIC BLOOD PRESSURE >= 90 MM HG: ICD-10-PCS | Mod: CPTII,,, | Performed by: STUDENT IN AN ORGANIZED HEALTH CARE EDUCATION/TRAINING PROGRAM

## 2022-10-05 PROCEDURE — 3077F PR MOST RECENT SYSTOLIC BLOOD PRESSURE >= 140 MM HG: ICD-10-PCS | Mod: CPTII,,, | Performed by: STUDENT IN AN ORGANIZED HEALTH CARE EDUCATION/TRAINING PROGRAM

## 2022-10-05 PROCEDURE — 3080F DIAST BP >= 90 MM HG: CPT | Mod: CPTII,,, | Performed by: STUDENT IN AN ORGANIZED HEALTH CARE EDUCATION/TRAINING PROGRAM

## 2022-10-05 PROCEDURE — 3008F BODY MASS INDEX DOCD: CPT | Mod: CPTII,,, | Performed by: STUDENT IN AN ORGANIZED HEALTH CARE EDUCATION/TRAINING PROGRAM

## 2022-10-05 PROCEDURE — 99215 PR OFFICE/OUTPT VISIT, EST, LEVL V, 40-54 MIN: ICD-10-PCS | Mod: ,,, | Performed by: STUDENT IN AN ORGANIZED HEALTH CARE EDUCATION/TRAINING PROGRAM

## 2022-10-05 RX ORDER — TEMOZOLOMIDE 100 MG/1
100 CAPSULE ORAL DAILY
Qty: 30 CAPSULE | Refills: 0 | Status: SHIPPED | OUTPATIENT
Start: 2022-10-05 | End: 2022-10-14 | Stop reason: DRUGHIGH

## 2022-10-05 RX ORDER — TEMOZOLOMIDE 20 MG/1
20 CAPSULE ORAL DAILY
Qty: 30 CAPSULE | Refills: 0 | Status: SHIPPED | OUTPATIENT
Start: 2022-10-05 | End: 2022-10-14 | Stop reason: DRUGHIGH

## 2022-10-05 RX ORDER — TEMOZOLOMIDE 5 MG/1
15 CAPSULE ORAL DAILY
Qty: 90 CAPSULE | Refills: 0 | Status: SHIPPED | OUTPATIENT
Start: 2022-10-05 | End: 2022-10-14 | Stop reason: DRUGHIGH

## 2022-10-05 NOTE — TELEPHONE ENCOUNTER
Patient's father called again. He would like to know what physical limitations his son has and for how long.

## 2022-10-07 ENCOUNTER — TELEPHONE (OUTPATIENT)
Dept: NEUROSURGERY | Facility: CLINIC | Age: 32
End: 2022-10-07
Payer: COMMERCIAL

## 2022-10-07 NOTE — TELEPHONE ENCOUNTER
The patient's dad returned my call.  He said the patient is no longer having headaches or any other pain.  He was concerned because he took his last Norco this morning.  I explained that he did not need to take the Norco if he was not hurting, that it was an as needed medication.  He was concerned that he would not have anything if the headaches returned.  I told him to call if the symptoms return.  I also let him know that he can take Tylenol if the headaches are mild, but to feel free to call with any questions or problems.  He has been seen by oncology and is scheduled to see Dr. Anthony with radiation oncology on Tuesday.  He is aware that we will be setting up a follow up here after his first post treatment MRI.  He will call with any concerns in the meantime.

## 2022-10-13 ENCOUNTER — TELEPHONE (OUTPATIENT)
Dept: NEUROSURGERY | Facility: CLINIC | Age: 32
End: 2022-10-13
Payer: COMMERCIAL

## 2022-10-13 NOTE — TELEPHONE ENCOUNTER
I spoke with the patient's dad and answered his questions.  While there is always a chance that any residual tumor could grow, it is not likely to progress in the time between the surgery and now.  He thinks the patient will be starting treatment with Temodar and radiation next week.  He was seen by Rad Onc on 10/11, note is under media tab.  I told him to call with any other questions or concerns.

## 2022-10-13 NOTE — PROGRESS NOTES
Referring provider: Dr. Sauceda  Reason for consult:  High-grade glioma     Patient is a 32-year-old with no past medical history who presented to the ER at [facility] on 09/20/2022 with symptoms of nausea, vomiting and headaches.  He had a CT of his head done in the ER which showed a right frontal lobe mass concerning for malignancy.  He was transferred to [facility] for neurosurgical consultation.  He underwent a right frontoparietal craniotomy with near total resection of the frontal lobe mass, pathology from which revealed high-grade glioma pending further classification.  Patient was discharged from hospital soon after his resection without any complications.    He presented to clinic on 10/05/2022 to establish care to discuss further disease management.      Today, 10/14/2022, patient denies any acute concerns.  He denies any headaches, vision changes or seizures.  He reports a stable gait and denies any falls.  Patient reports being tapered down on his dexamethasone by Radiation Oncology since his last visit with us.  He has an MRI scheduled for next week with plans of starting radiation therapy soon after.      ECOG 0, worked in [employer] prior to his presentation, lives with his father, denies any family history of malignancy.  Former smoker, quit for many years, denies alcohol or recreational drug use.    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    Imaging   09/21/2022 MRI brain with and without contrast:  Findings consistent with recent craniotomy in the right frontal region for resection of a hemorrhagic mass with some persistent enhancement seen along with the mesial margin of the surgical bed suggesting some residual mass.  Fluid seen in the surgical bed with subdural hematoma seen on the right side as well as with air seen on craniotomy site.  Midline shift from right to left, intraventricular hemorrhage seen in  the right lateral ventricle.      Past Medical History:   Diagnosis Date    Brain cancer        Past Surgical History:   Procedure Laterality Date    CRANIOTOMY FOR EXCISION OF INTRACRANIAL TUMOR      VENTRICULOSTOMY Left 9/20/2022    Procedure: VENTRICULOSTOMY;  Surgeon: Duglas Folwer MD;  Location: [facility];  Service: Neurosurgery;  Laterality: Left;       Family History   Family history unknown: Yes       Social History     Socioeconomic History    Marital status: Unknown   Tobacco Use    Smoking status: Never    Smokeless tobacco: Never   Substance and Sexual Activity    Alcohol use: Never    Drug use: Never       Current Outpatient Medications   Medication Sig Dispense Refill    dexAMETHasone (DECADRON) 2 MG tablet Take 1 tablet (2 mg total) by mouth 2 (two) times a day. 60 tablet 1    HYDROcodone-acetaminophen (NORCO) 5-325 mg per tablet Take 1 tablet by mouth every 6 (six) hours as needed for Pain. 28 tablet 0    levETIRAcetam (KEPPRA) 500 MG Tab Take 1 tablet (500 mg total) by mouth 2 (two) times daily. 60 tablet 11    temozolomide (TEMODAR) 100 MG capsule Take 1 capsule (100 mg total) by mouth once daily Take as directed days 1-5 every 7 days with radiation therapy. Take on an empty stomach. with 2 other temozolomide prescriptions for 135 mg total. 30 capsule 0    temozolomide (TEMODAR) 20 MG capsule Take 1 capsule (20 mg total) by mouth once daily Take as directed days 1-5 every 7 days with radiation therapy. Take on an empty stomach. with 2 other temozolomide prescriptions for 135 mg total. 30 capsule 0    temozolomide (TEMODAR) 5 MG capsule Take 3 capsules (15 mg total) by mouth once daily Take as directed days 1-5 every 7 days with radiation therapy. Take on an empty stomach. with 2 other temozolomide prescriptions for 135 mg total. 90 capsule 0     No current facility-administered medications for this visit.       Review of patient's allergies indicates:  No Known Allergies    Review of  system  CONSTITUTIONAL: no fevers, no chills, no weight loss, no fatigue, no weakness  HEMATOLOGIC: no abnormal bleeding, no abnormal bruising, no drenching night sweats  ONCOLOGIC: no new masses or lumps  HEENT: no vision loss, no tinnitus or hearing loss, no nose bleeding, no dysphagia, no odynophagia  CVS: no chest pain, no palpitations, no dyspnea on exertion  RESP: no shortness of breath, no hemoptysis, no cough  GI: no nausea, no vomiting, no diarrhea, no constipation, no melena, no hematochezia, no hematemesis, no abdominal pain, no increase in abdominal girth  : no dysuria, no hematuria, no hesitancy, no scrotal swelling, no discharge  INTEGUMENT: no rashes, no abnormal bruising, no nail pitting, no hyperpigmentation  NEURO: no falls, no memory loss, no paresthesias or dysesthesias, no urofecal incontinence or retention, no loss of strength on any extremity  MSK: no back pain, no new joint pain, no joint swelling  PSYCH: no suicidal or homicidal ideation, no depression, no insomnia, no anhedonia  ENDOCRINE: no heat or cold intolerance, no polyuria, no polydipsia      Physical Exam:  Vitals:    10/14/22 1134   BP: 139/88   Pulse: 79   Resp: 20       ECOG PS 0  GA: AAOx3, NAD  HEENT: NCAT, PERRLA, EOMI, good dentition, moist oral mucous membranes, well-healed incision over the right frontal region  LYMPH: no cervical, axillary or supraclavicular adenopathy  CVS: s1s2 RRR, no M/R/G  RESP: CTA b/l, no crackles, no wheezes or rhonchi  ABD: soft, NT, ND, BS+, no hepatosplenomegaly  EXT: no deformities, no pedal edema  SKIN: no rashes, warm and dry  NEURO: normal mentation, strength 5/5 on all 4 extremities, no sensory deficits, cranial nerves 2-12 grossly intact      Assessment and plan    # High-grade glioma, WHO grade 4, 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    Discussed the option to explore clinical trials given patient's young age and good ECOG  Will request pathology to obtain MGMT methylation status on patient's pathology in addition to IDH mutation.  I discussed this with pathology office, patient's report is pending from [place].    Patient has been seen by radiation oncology with plans for simulation and MRI prior to starting adjuvant concurrent chemo RT.  Will send prescription for Temodar 75 mg per m2 days 1-7 concurrently with radiation for insurance authorization  Patient was advised to hold off on starting Temodar until the start of radiation treatments and after follow-up with me.          Plan   Follow-up pending MGMT methylation and IDH mutation analysis.  Discussed case with pathology today   One double-strength Bactrim for PJP prophylaxis Monday Wednesday Friday sent to his pharmacy today to start with concurrent chemo RT  Temodar 75 mg per m2 days 1-7 concurrently with radiation, prescription sent to pharmacy today   Plan to follow-up on 24th October 2022 with CBC and CMP prior to starting concurrent chemoRT  With plan to refer patient to an academic center in the near future.     A total of  30 minutes were spent in review of records, interpretation of test, coordination of care, discussion and counseling with the patient.          Portions of the record may have been created with voice recognition software. Occasional wrong-word or sound-a-like substitutions may have occurred due to the inherent limitations of voice recognition software. Read the chart carefully and recognize, using context, where substitutions have occurred.

## 2022-10-14 ENCOUNTER — OFFICE VISIT (OUTPATIENT)
Dept: HEMATOLOGY/ONCOLOGY | Facility: CLINIC | Age: 32
End: 2022-10-14
Payer: COMMERCIAL

## 2022-10-14 VITALS
HEART RATE: 79 BPM | HEIGHT: 72 IN | RESPIRATION RATE: 20 BRPM | OXYGEN SATURATION: 99 % | BODY MASS INDEX: 19.82 KG/M2 | SYSTOLIC BLOOD PRESSURE: 139 MMHG | WEIGHT: 146.31 LBS | DIASTOLIC BLOOD PRESSURE: 88 MMHG

## 2022-10-14 DIAGNOSIS — C71.9 HIGH GRADE GLIOMA NOT CLASSIFIABLE BY WHO CRITERIA: ICD-10-CM

## 2022-10-14 DIAGNOSIS — G93.89 BRAIN MASS: ICD-10-CM

## 2022-10-14 PROCEDURE — 1111F DSCHRG MED/CURRENT MED MERGE: CPT | Mod: CPTII,,, | Performed by: STUDENT IN AN ORGANIZED HEALTH CARE EDUCATION/TRAINING PROGRAM

## 2022-10-14 PROCEDURE — 3079F DIAST BP 80-89 MM HG: CPT | Mod: CPTII,,, | Performed by: STUDENT IN AN ORGANIZED HEALTH CARE EDUCATION/TRAINING PROGRAM

## 2022-10-14 PROCEDURE — 3075F PR MOST RECENT SYSTOLIC BLOOD PRESS GE 130-139MM HG: ICD-10-PCS | Mod: CPTII,,, | Performed by: STUDENT IN AN ORGANIZED HEALTH CARE EDUCATION/TRAINING PROGRAM

## 2022-10-14 PROCEDURE — 1111F PR DISCHARGE MEDS RECONCILED W/ CURRENT OUTPATIENT MED LIST: ICD-10-PCS | Mod: CPTII,,, | Performed by: STUDENT IN AN ORGANIZED HEALTH CARE EDUCATION/TRAINING PROGRAM

## 2022-10-14 PROCEDURE — 3075F SYST BP GE 130 - 139MM HG: CPT | Mod: CPTII,,, | Performed by: STUDENT IN AN ORGANIZED HEALTH CARE EDUCATION/TRAINING PROGRAM

## 2022-10-14 PROCEDURE — 99214 PR OFFICE/OUTPT VISIT, EST, LEVL IV, 30-39 MIN: ICD-10-PCS | Mod: ,,, | Performed by: STUDENT IN AN ORGANIZED HEALTH CARE EDUCATION/TRAINING PROGRAM

## 2022-10-14 PROCEDURE — 1159F PR MEDICATION LIST DOCUMENTED IN MEDICAL RECORD: ICD-10-PCS | Mod: CPTII,,, | Performed by: STUDENT IN AN ORGANIZED HEALTH CARE EDUCATION/TRAINING PROGRAM

## 2022-10-14 PROCEDURE — 1159F MED LIST DOCD IN RCRD: CPT | Mod: CPTII,,, | Performed by: STUDENT IN AN ORGANIZED HEALTH CARE EDUCATION/TRAINING PROGRAM

## 2022-10-14 PROCEDURE — 3079F PR MOST RECENT DIASTOLIC BLOOD PRESSURE 80-89 MM HG: ICD-10-PCS | Mod: CPTII,,, | Performed by: STUDENT IN AN ORGANIZED HEALTH CARE EDUCATION/TRAINING PROGRAM

## 2022-10-14 PROCEDURE — 99214 OFFICE O/P EST MOD 30 MIN: CPT | Mod: ,,, | Performed by: STUDENT IN AN ORGANIZED HEALTH CARE EDUCATION/TRAINING PROGRAM

## 2022-10-14 RX ORDER — TEMOZOLOMIDE 5 MG/1
15 CAPSULE ORAL DAILY
Qty: 90 CAPSULE | Refills: 0 | Status: SHIPPED | OUTPATIENT
Start: 2022-10-14 | End: 2022-11-25

## 2022-10-14 RX ORDER — TEMOZOLOMIDE 20 MG/1
20 CAPSULE ORAL DAILY
Qty: 30 CAPSULE | Refills: 0 | Status: SHIPPED | OUTPATIENT
Start: 2022-10-14 | End: 2022-11-25

## 2022-10-14 RX ORDER — TEMOZOLOMIDE 100 MG/1
100 CAPSULE ORAL DAILY
Qty: 30 CAPSULE | Refills: 0 | Status: SHIPPED | OUTPATIENT
Start: 2022-10-14 | End: 2022-11-25

## 2022-10-14 RX ORDER — SULFAMETHOXAZOLE AND TRIMETHOPRIM 800; 160 MG/1; MG/1
1 TABLET ORAL
Qty: 20 TABLET | Refills: 0 | Status: SHIPPED | OUTPATIENT
Start: 2022-10-14 | End: 2022-12-28

## 2022-10-17 ENCOUNTER — OFFICE VISIT (OUTPATIENT)
Dept: HEMATOLOGY/ONCOLOGY | Facility: CLINIC | Age: 32
End: 2022-10-17
Payer: COMMERCIAL

## 2022-10-17 VITALS
RESPIRATION RATE: 18 BRPM | BODY MASS INDEX: 20.48 KG/M2 | OXYGEN SATURATION: 100 % | TEMPERATURE: 98 F | SYSTOLIC BLOOD PRESSURE: 126 MMHG | DIASTOLIC BLOOD PRESSURE: 82 MMHG | HEART RATE: 74 BPM | WEIGHT: 151.19 LBS | HEIGHT: 72 IN

## 2022-10-17 DIAGNOSIS — C71.9 HIGH GRADE GLIOMA NOT CLASSIFIABLE BY WHO CRITERIA: Primary | ICD-10-CM

## 2022-10-17 PROCEDURE — 1111F PR DISCHARGE MEDS RECONCILED W/ CURRENT OUTPATIENT MED LIST: ICD-10-PCS | Mod: CPTII,,,

## 2022-10-17 PROCEDURE — 1159F PR MEDICATION LIST DOCUMENTED IN MEDICAL RECORD: ICD-10-PCS | Mod: CPTII,,,

## 2022-10-17 PROCEDURE — 99214 PR OFFICE/OUTPT VISIT, EST, LEVL IV, 30-39 MIN: ICD-10-PCS | Mod: ,,,

## 2022-10-17 PROCEDURE — 1111F DSCHRG MED/CURRENT MED MERGE: CPT | Mod: CPTII,,,

## 2022-10-17 PROCEDURE — 99214 OFFICE O/P EST MOD 30 MIN: CPT | Mod: ,,,

## 2022-10-17 PROCEDURE — 3079F DIAST BP 80-89 MM HG: CPT | Mod: CPTII,,,

## 2022-10-17 PROCEDURE — 3079F PR MOST RECENT DIASTOLIC BLOOD PRESSURE 80-89 MM HG: ICD-10-PCS | Mod: CPTII,,,

## 2022-10-17 PROCEDURE — 3074F PR MOST RECENT SYSTOLIC BLOOD PRESSURE < 130 MM HG: ICD-10-PCS | Mod: CPTII,,,

## 2022-10-17 PROCEDURE — 3074F SYST BP LT 130 MM HG: CPT | Mod: CPTII,,,

## 2022-10-17 PROCEDURE — 1159F MED LIST DOCD IN RCRD: CPT | Mod: CPTII,,,

## 2022-10-17 NOTE — PROGRESS NOTES
Referring provider: Dr. Sauceda  Reason for consult:  High-grade glioma     Patient is a 32-year-old with no past medical history who [place] [facility] [place]2022 with symptoms of nausea, vomiting and headaches.  He had a CT of his head done in the ER which showed a right frontal lobe mass concerning for malignancy.  He was transferred to [facility] for neurosurgical consultation.  He underwent a right frontoparietal craniotomy with near total resection of the frontal lobe mass, pathology from which revealed high-grade glioma pending further classification.  Patient was discharged from hospital soon after his resection without any complications.    He presented to clinic on 10/05/2022 to establish care to discuss further disease management.      Today, 10/17/2022, patient denies any acute concerns.  He returns to clinic today for chemo education for Temodar. He denies any headaches, vision changes or seizures.  He reports a stable gait and denies any falls.  Patient reports being tapered down on his dexamethasone by Radiation Oncology since his last visit with us.  He has an MRI scheduled tomorrow with plans of starting radiation therapy soon after.      ECOG 0, worked in [employer] prior to his presentation, lives with his father, denies any family history of malignancy.  Former smoker, quit for many years, denies alcohol or recreational drug use.    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    Imaging   09/21/2022 MRI brain with and without contrast:  Findings consistent with recent craniotomy in the right frontal region for resection of a hemorrhagic mass with some persistent enhancement seen along with the mesial margin of the surgical bed suggesting some residual mass.  Fluid seen in the surgical bed with subdural hematoma seen on the right side as well as with air seen on craniotomy site.  Midline shift  from right to left, intraventricular hemorrhage seen in the right lateral ventricle.      Past Medical History:   Diagnosis Date    Brain cancer        Past Surgical History:   Procedure Laterality Date    CRANIOTOMY FOR EXCISION OF INTRACRANIAL TUMOR      VENTRICULOSTOMY Left 9/20/2022    Procedure: VENTRICULOSTOMY;  Surgeon: Duglas Fowler MD;  Location: [facility];  Service: Neurosurgery;  Laterality: Left;       Family History   Family history unknown: Yes       Social History     Socioeconomic History    Marital status: Unknown   Tobacco Use    Smoking status: Never    Smokeless tobacco: Never   Substance and Sexual Activity    Alcohol use: Never    Drug use: Never       Current Outpatient Medications   Medication Sig Dispense Refill    dexAMETHasone (DECADRON) 2 MG tablet Take 1 tablet (2 mg total) by mouth 2 (two) times a day. 60 tablet 1    levETIRAcetam (KEPPRA) 500 MG Tab Take 1 tablet (500 mg total) by mouth 2 (two) times daily. 60 tablet 11    HYDROcodone-acetaminophen (NORCO) 5-325 mg per tablet Take 1 tablet by mouth every 6 (six) hours as needed for Pain. (Patient not taking: Reported on 10/17/2022) 28 tablet 0    sulfamethoxazole-trimethoprim 800-160mg (BACTRIM DS) 800-160 mg Tab Take 1 tablet by mouth every Mon, Wed, Fri. (Patient not taking: Reported on 10/17/2022) 20 tablet 0    temozolomide (TEMODAR) 100 MG capsule Take 1 capsule (100 mg total) by mouth once daily 7 days with radiation therapy from first day to last day of radiation. Take on an empty stomach. with 2 other temozolomide prescriptions for 135 mg total. (Patient not taking: Reported on 10/17/2022.) 30 capsule 0    temozolomide (TEMODAR) 20 MG capsule Take 1 capsule (20 mg total) by mouth once daily 7 days with radiation therapy from first day to last day of radiation. Take on an empty stomach. with 2 other temozolomide prescriptions for 135 mg total. (Patient not taking: Reported on 10/17/2022.) 30 capsule 0    temozolomide (TEMODAR) 5  MG capsule Take 3 capsules (15 mg total) by mouth once daily 7 days with radiation therapy from first day to last day of radiation. Take on an empty stomach. with 2 other temozolomide prescriptions for 135 mg total. (Patient not taking: Reported on 10/17/2022.) 90 capsule 0     No current facility-administered medications for this visit.       Review of patient's allergies indicates:  No Known Allergies    Review of system  CONSTITUTIONAL: no fevers, no chills, no weight loss, no fatigue, no weakness  HEMATOLOGIC: no abnormal bleeding, no abnormal bruising, no drenching night sweats  ONCOLOGIC: no new masses or lumps  HEENT: no vision loss, no tinnitus or hearing loss, no nose bleeding, no dysphagia, no odynophagia  CVS: no chest pain, no palpitations, no dyspnea on exertion  RESP: no shortness of breath, no hemoptysis, no cough  GI: no nausea, no vomiting, no diarrhea, no constipation, no melena, no hematochezia, no hematemesis, no abdominal pain, no increase in abdominal girth  : no dysuria, no hematuria, no hesitancy, no scrotal swelling, no discharge  INTEGUMENT: no rashes, no abnormal bruising, no nail pitting, no hyperpigmentation  NEURO: no falls, no memory loss, no paresthesias or dysesthesias, no urofecal incontinence or retention, no loss of strength on any extremity  MSK: no back pain, no new joint pain, no joint swelling  PSYCH: no suicidal or homicidal ideation, no depression, no insomnia, no anhedonia  ENDOCRINE: no heat or cold intolerance, no polyuria, no polydipsia      Physical Exam:  Vitals:    10/17/22 1302   BP: 126/82   Pulse: 74   Resp: 18   Temp: 98.2 °F (36.8 °C)         ECOG PS 0  GA: AAOx3, NAD  HEENT: NCAT, PERRLA, EOMI, good dentition, moist oral mucous membranes, well-healed incision over the right frontal region  LYMPH: no cervical, axillary or supraclavicular adenopathy  CVS: s1s2 RRR, no M/R/G  RESP: CTA b/l, no crackles, no wheezes or rhonchi  ABD: soft, NT, ND, BS+, no  hepatosplenomegaly  EXT: no deformities, no pedal edema  SKIN: no rashes, warm and dry  NEURO: normal mentation, strength 5/5 on all 4 extremities, no sensory deficits, cranial nerves 2-12 grossly intact      Assessment and plan    # High-grade glioma, WHO grade 4, 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    Discussed the option to explore clinical trials given patient's young age and good ECOG  Will request pathology to obtain MGMT methylation status on patient's pathology in addition to IDH mutation.  I discussed this with pathology office, patient's report is pending from [place].    Patient has been seen by radiation oncology with plans for simulation and MRI prior to starting adjuvant concurrent chemo RT.  Will send prescription for Temodar 75 mg per m2 days 1-7 concurrently with radiation for insurance authorization  Patient was advised to hold off on starting Temodar until the start of radiation treatments and after follow-up with me.      Chemotherapy teaching provided to patient. Plan of care including chemotherapy regimen, schedule expectations, potential side effects, as well as prevention/management of side effects were discussed in detail. Office contact information provided along with instructions on symptoms that warrant a call to the office, for example a temperature of > 100.5, uncontrolled side effects, severe fatigue etc. Time was given for patient to ask questions. All questions answered and they verbalized understanding. They were instructed to call with any concerns or additional questions.     Plan   Follow-up pending MGMT methylation and IDH mutation analysis. Case discussed  with pathology.  One double-strength Bactrim for PJP prophylaxis Monday Wednesday Friday sent to his pharmacy today to start with concurrent chemo RT  Temodar 75 mg per m2 days 1-7 concurrently with radiation, prescription sent to pharmacy today. Currently pending insurance approval. Patient to call clinic once medication received.  Plan to follow-up on 24th October 2022 with CBC and CMP prior to starting concurrent chemoRT  With plan to refer patient to an academic center in the near future.     Liz Victoria, JANE-C

## 2022-10-18 DIAGNOSIS — C71.9 HIGH GRADE GLIOMA NOT CLASSIFIABLE BY WHO CRITERIA: Primary | ICD-10-CM

## 2022-10-18 RX ORDER — ONDANSETRON HYDROCHLORIDE 8 MG/1
8 TABLET, FILM COATED ORAL EVERY 8 HOURS PRN
Qty: 30 TABLET | Refills: 1 | Status: SHIPPED | OUTPATIENT
Start: 2022-10-18 | End: 2023-05-26 | Stop reason: SDUPTHER

## 2022-10-19 ENCOUNTER — TELEPHONE (OUTPATIENT)
Dept: NEUROSURGERY | Facility: CLINIC | Age: 32
End: 2022-10-19
Payer: COMMERCIAL

## 2022-10-22 NOTE — PROGRESS NOTES
Referring provider: Dr. Sauceda  Reason for consult:  High-grade glioma     Patient is a 32-year-old with no past medical history who presented to the ER at [facility] on 09/20/2022 with symptoms of nausea, vomiting and headaches.  He had a CT of his head done in the ER which showed a right frontal lobe mass concerning for malignancy.  He was transferred to [facility] for neurosurgical consultation.  He underwent a right frontoparietal craniotomy with near total resection of the frontal lobe mass, pathology from which revealed high-grade glioma pending further classification.  Patient was discharged from hospital soon after his resection without any complications.    He presented to clinic on 10/05/2022 to establish care to discuss further disease management.      Today, 10/24/2022, patient denies any acute concerns today.  He denies any new medications, alcohol use since his last follow-up with us.  Patient denies any abdominal pain, nausea, vomiting, yellowing of his skin or decreased appetite.  He reports tapering his dexamethasone down currently.  Patient is scheduled to start concurrent chemo RT on 10/26/2022.     ECOG 0, worked in [employer] prior to his presentation, lives with his father, denies any family history of malignancy.  Former smoker, quit for many years, denies alcohol or recreational drug use.    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    Imaging   09/21/2022 MRI brain with and without contrast:  Findings consistent with recent craniotomy in the right frontal region for resection of a hemorrhagic mass with some persistent enhancement seen along with the mesial margin of the surgical bed suggesting some residual mass.  Fluid seen in the surgical bed with subdural hematoma seen on the right side as well as with air seen on craniotomy site.  Midline shift from right to left, intraventricular hemorrhage seen in  the right lateral ventricle.      Past Medical History:   Diagnosis Date    Brain cancer        Past Surgical History:   Procedure Laterality Date    CRANIOTOMY FOR EXCISION OF INTRACRANIAL TUMOR      VENTRICULOSTOMY Left 9/20/2022    Procedure: VENTRICULOSTOMY;  Surgeon: Duglas Fowler MD;  Location: [facility];  Service: Neurosurgery;  Laterality: Left;       Family History   Problem Relation Age of Onset    Hypertension Father        Social History     Socioeconomic History    Marital status: Unknown   Tobacco Use    Smoking status: Never    Smokeless tobacco: Never   Substance and Sexual Activity    Alcohol use: Never    Drug use: Never       Current Outpatient Medications   Medication Sig Dispense Refill    HYDROcodone-acetaminophen (NORCO) 5-325 mg per tablet Take 1 tablet by mouth every 6 (six) hours as needed for Pain. 28 tablet 0    levETIRAcetam (KEPPRA) 500 MG Tab Take 1 tablet (500 mg total) by mouth 2 (two) times daily. 60 tablet 11    ondansetron (ZOFRAN) 8 MG tablet Take 1 tablet (8 mg total) by mouth every 8 (eight) hours as needed for Nausea. 30 tablet 1    sulfamethoxazole-trimethoprim 800-160mg (BACTRIM DS) 800-160 mg Tab Take 1 tablet by mouth every Mon, Wed, Fri. 20 tablet 0    temozolomide (TEMODAR) 100 MG capsule Take 1 capsule (100 mg total) by mouth once daily 7 days with radiation therapy from first day to last day of radiation. Take on an empty stomach. with 2 other temozolomide prescriptions for 135 mg total. 30 capsule 0    temozolomide (TEMODAR) 20 MG capsule Take 1 capsule (20 mg total) by mouth once daily 7 days with radiation therapy from first day to last day of radiation. Take on an empty stomach. with 2 other temozolomide prescriptions for 135 mg total. 30 capsule 0    temozolomide (TEMODAR) 5 MG capsule Take 3 capsules (15 mg total) by mouth once daily 7 days with radiation therapy from first day to last day of radiation. Take on an empty stomach. with 2 other temozolomide  prescriptions for 135 mg total. 90 capsule 0     No current facility-administered medications for this visit.       Review of patient's allergies indicates:  No Known Allergies    Review of system  CONSTITUTIONAL: no fevers, no chills, no weight loss, no fatigue, no weakness  HEMATOLOGIC: no abnormal bleeding, no abnormal bruising, no drenching night sweats  ONCOLOGIC: no new masses or lumps  HEENT: no vision loss, no tinnitus or hearing loss, no nose bleeding, no dysphagia, no odynophagia  CVS: no chest pain, no palpitations, no dyspnea on exertion  RESP: no shortness of breath, no hemoptysis, no cough  GI: no nausea, no vomiting, no diarrhea, no constipation, no melena, no hematochezia, no hematemesis, no abdominal pain, no increase in abdominal girth  : no dysuria, no hematuria, no hesitancy, no scrotal swelling, no discharge  INTEGUMENT: no rashes, no abnormal bruising, no nail pitting, no hyperpigmentation  NEURO: no falls, no memory loss, no paresthesias or dysesthesias, no urofecal incontinence or retention, no loss of strength on any extremity  MSK: no back pain, no new joint pain, no joint swelling  PSYCH: no suicidal or homicidal ideation, no depression, no insomnia, no anhedonia  ENDOCRINE: no heat or cold intolerance, no polyuria, no polydipsia      Physical Exam:  Vitals:    10/24/22 0846   BP: (!) 147/92   Pulse: 85   Resp: 18   Temp: 97.2 °F (36.2 °C)         ECOG PS 0  GA: AAOx3, NAD  HEENT: NCAT, PERRLA, EOMI, good dentition, moist oral mucous membranes, well-healed incision over the right frontal region  LYMPH: no cervical, axillary or supraclavicular adenopathy  CVS: s1s2 RRR, no M/R/G  RESP: CTA b/l, no crackles, no wheezes or rhonchi  ABD: soft, NT, ND, BS+, no hepatosplenomegaly  EXT: no deformities, no pedal edema  SKIN: no rashes, warm and dry  NEURO: normal mentation, strength 5/5 on all 4 extremities, no sensory deficits, cranial nerves 2-12 grossly intact      Assessment and plan    #  GBM, WHO grade 4, IDH1 mutated,  ATRX mutated, MGMT methylation status pending  Status post gross total resection on 09/20/2022   Reviewed pathology report, noted IDH mutation status pending   Discussed with patient and his father the diagnosis, aggressive nature of disease, poor prognosis and treatment recommendations including adjuvant chemoradiation followed by chemotherapy with Temodar.    Discussed the option to explore clinical trials given patient's young age and good ECOG  Will request pathology to obtain MGMT methylation status on patient's pathology, currently pending.  Patient has been seen by radiation oncology with plans  start adjuvant chemoradiation therapy   Prescription for Temodar 75 mg per m2 days 1-7 concurrently with radiation sent on his initial visit, and have reached out to the MA to get it authorized at his last visit.  Currently pending.  Patient was advised to hold off on starting Temodar and Bactrim until the start of radiation treatments      Plan   Follow-up pending MGMT methylation  One double-strength Bactrim for PJP prophylaxis Monday Wednesday Friday sent to his pharmacy to start with concurrent chemo RT  Temodar 75 mg per m2 days 1-7 concurrently with radiation, prescription sent to pharmacy during his initial visit, and have reached out to the MA at the last visit and today get patient has medications.  Will continue to follow   Patient to start chemoradiation on 10/26/2022.  Noted mild transaminitis, with normal bilirubin and alkaline phosphatase.  Will obtain repeat blood work on 10/26/2022 and in 10 days prior to follow-up in clinic to assess treatment tolerance.  With plan to refer patient to an academic center in the near future.     A total of  40 minutes were spent in review of records, interpretation of test, coordination of care, discussion and counseling with the patient.          Portions of the record may have been created with voice recognition software. Occasional  wrong-word or sound-a-like substitutions may have occurred due to the inherent limitations of voice recognition software. Read the chart carefully and recognize, using context, where substitutions have occurred.

## 2022-10-24 ENCOUNTER — OFFICE VISIT (OUTPATIENT)
Dept: HEMATOLOGY/ONCOLOGY | Facility: CLINIC | Age: 32
End: 2022-10-24
Payer: COMMERCIAL

## 2022-10-24 VITALS
TEMPERATURE: 97 F | SYSTOLIC BLOOD PRESSURE: 147 MMHG | OXYGEN SATURATION: 100 % | HEART RATE: 85 BPM | WEIGHT: 156.88 LBS | BODY MASS INDEX: 21.28 KG/M2 | DIASTOLIC BLOOD PRESSURE: 92 MMHG | RESPIRATION RATE: 18 BRPM

## 2022-10-24 DIAGNOSIS — C71.9 HIGH GRADE GLIOMA NOT CLASSIFIABLE BY WHO CRITERIA: Primary | ICD-10-CM

## 2022-10-24 DIAGNOSIS — C71.9 GBM (GLIOBLASTOMA MULTIFORME): ICD-10-CM

## 2022-10-24 PROCEDURE — 3077F SYST BP >= 140 MM HG: CPT | Mod: CPTII,,, | Performed by: STUDENT IN AN ORGANIZED HEALTH CARE EDUCATION/TRAINING PROGRAM

## 2022-10-24 PROCEDURE — 3080F DIAST BP >= 90 MM HG: CPT | Mod: CPTII,,, | Performed by: STUDENT IN AN ORGANIZED HEALTH CARE EDUCATION/TRAINING PROGRAM

## 2022-10-24 PROCEDURE — 1159F PR MEDICATION LIST DOCUMENTED IN MEDICAL RECORD: ICD-10-PCS | Mod: CPTII,,, | Performed by: STUDENT IN AN ORGANIZED HEALTH CARE EDUCATION/TRAINING PROGRAM

## 2022-10-24 PROCEDURE — 1111F DSCHRG MED/CURRENT MED MERGE: CPT | Mod: CPTII,,, | Performed by: STUDENT IN AN ORGANIZED HEALTH CARE EDUCATION/TRAINING PROGRAM

## 2022-10-24 PROCEDURE — 3080F PR MOST RECENT DIASTOLIC BLOOD PRESSURE >= 90 MM HG: ICD-10-PCS | Mod: CPTII,,, | Performed by: STUDENT IN AN ORGANIZED HEALTH CARE EDUCATION/TRAINING PROGRAM

## 2022-10-24 PROCEDURE — 1159F MED LIST DOCD IN RCRD: CPT | Mod: CPTII,,, | Performed by: STUDENT IN AN ORGANIZED HEALTH CARE EDUCATION/TRAINING PROGRAM

## 2022-10-24 PROCEDURE — 99215 PR OFFICE/OUTPT VISIT, EST, LEVL V, 40-54 MIN: ICD-10-PCS | Mod: ,,, | Performed by: STUDENT IN AN ORGANIZED HEALTH CARE EDUCATION/TRAINING PROGRAM

## 2022-10-24 PROCEDURE — 99215 OFFICE O/P EST HI 40 MIN: CPT | Mod: ,,, | Performed by: STUDENT IN AN ORGANIZED HEALTH CARE EDUCATION/TRAINING PROGRAM

## 2022-10-24 PROCEDURE — 3077F PR MOST RECENT SYSTOLIC BLOOD PRESSURE >= 140 MM HG: ICD-10-PCS | Mod: CPTII,,, | Performed by: STUDENT IN AN ORGANIZED HEALTH CARE EDUCATION/TRAINING PROGRAM

## 2022-10-24 PROCEDURE — 1111F PR DISCHARGE MEDS RECONCILED W/ CURRENT OUTPATIENT MED LIST: ICD-10-PCS | Mod: CPTII,,, | Performed by: STUDENT IN AN ORGANIZED HEALTH CARE EDUCATION/TRAINING PROGRAM

## 2022-10-27 LAB
C1INH SERPL-MCNC: NORMAL MG/DL
DHEA SERPL-MCNC: NORMAL
ESTRIOL SERPL-MCNC: NORMAL NG/ML
ESTROGEN SERPL-MCNC: NORMAL PG/ML
INSULIN SERPL-ACNC: NORMAL U[IU]/ML
LAB AP CLINICAL INFORMATION: NORMAL
LAB AP GROSS DESCRIPTION: NORMAL
LAB AP INTRA OP: NORMAL
LAB AP REPORT FOOTNOTES: NORMAL
T3RU NFR SERPL: NORMAL %

## 2022-10-31 ENCOUNTER — TELEPHONE (OUTPATIENT)
Dept: HEMATOLOGY/ONCOLOGY | Facility: CLINIC | Age: 32
End: 2022-10-31
Payer: COMMERCIAL

## 2022-10-31 NOTE — TELEPHONE ENCOUNTER
Patient father said he decided to try calling here because they met Dr. Sauceda when his son was in the hospital and thought he may be able to help. He is very concerned because his son has been waiting a long time for his treatment to begin and wants to do whatever he can to get things started. I advised him that I will send a message to the [place] office asking them to call him about this.

## 2022-11-01 DIAGNOSIS — C71.9 GBM (GLIOBLASTOMA MULTIFORME): Primary | ICD-10-CM

## 2022-11-01 RX ORDER — LEVETIRACETAM 500 MG/1
500 TABLET ORAL 2 TIMES DAILY
Qty: 60 TABLET | Refills: 11 | Status: SHIPPED | OUTPATIENT
Start: 2022-11-01 | End: 2022-12-29 | Stop reason: SDUPTHER

## 2022-11-01 RX ORDER — DEXAMETHASONE 2 MG/1
2 TABLET ORAL 2 TIMES DAILY
Qty: 60 TABLET | Refills: 1 | Status: SHIPPED | OUTPATIENT
Start: 2022-11-01 | End: 2022-12-28

## 2022-11-01 NOTE — TELEPHONE ENCOUNTER
Patient called for refills of Keppra and Decadron.  He was not sure if he needed to stay on either medication, but said he only had two left of each.  I told him he needed to continue the Keppra for sure.  I asked how much of the Decadron he was on currently.  He said that Dr. Anthony had decreased the steroid from twice a day to once a day.  He was not sure if he was to remain at that dose.  He had not started radiation/Temodar yet.  He said he had 4mg tablets.  Everything listed in his chart says that he was on 2mg tablets.  I called [facility] in [place] to see if he needed to stay on Decadron.  They had received a call from the patient about it, but he hung up before being transferred to the nurse.  He is to start radiation tomorrow at 2pm.  Dr. Anthony had already left for the day, but she was going to get with him first thing in the morning to see if he needed to remain on the medication.  She will call me back tomorrow before the treatment at 2pm.  I went ahead and sent in a refill of Keppra and 2mg tablets of Decadron to [facility] in [place].  I will check on him tomorrow to make sure he is taking the medications appropriately.

## 2022-11-07 ENCOUNTER — OFFICE VISIT (OUTPATIENT)
Dept: HEMATOLOGY/ONCOLOGY | Facility: CLINIC | Age: 32
End: 2022-11-07
Payer: COMMERCIAL

## 2022-11-07 VITALS
OXYGEN SATURATION: 100 % | HEART RATE: 110 BPM | HEIGHT: 72 IN | BODY MASS INDEX: 21.54 KG/M2 | TEMPERATURE: 98 F | SYSTOLIC BLOOD PRESSURE: 158 MMHG | WEIGHT: 159 LBS | RESPIRATION RATE: 18 BRPM | DIASTOLIC BLOOD PRESSURE: 84 MMHG

## 2022-11-07 DIAGNOSIS — Z51.11 ENCOUNTER FOR ANTINEOPLASTIC CHEMOTHERAPY: ICD-10-CM

## 2022-11-07 DIAGNOSIS — C71.9 GRADE IV ASTROCYTOMA: ICD-10-CM

## 2022-11-07 DIAGNOSIS — C71.9 HIGH GRADE GLIOMA NOT CLASSIFIABLE BY WHO CRITERIA: Primary | ICD-10-CM

## 2022-11-07 PROCEDURE — 3077F PR MOST RECENT SYSTOLIC BLOOD PRESSURE >= 140 MM HG: ICD-10-PCS | Mod: CPTII,,, | Performed by: STUDENT IN AN ORGANIZED HEALTH CARE EDUCATION/TRAINING PROGRAM

## 2022-11-07 PROCEDURE — 1159F MED LIST DOCD IN RCRD: CPT | Mod: CPTII,,, | Performed by: STUDENT IN AN ORGANIZED HEALTH CARE EDUCATION/TRAINING PROGRAM

## 2022-11-07 PROCEDURE — 99215 OFFICE O/P EST HI 40 MIN: CPT | Mod: ,,, | Performed by: STUDENT IN AN ORGANIZED HEALTH CARE EDUCATION/TRAINING PROGRAM

## 2022-11-07 PROCEDURE — 3008F BODY MASS INDEX DOCD: CPT | Mod: CPTII,,, | Performed by: STUDENT IN AN ORGANIZED HEALTH CARE EDUCATION/TRAINING PROGRAM

## 2022-11-07 PROCEDURE — 3008F PR BODY MASS INDEX (BMI) DOCUMENTED: ICD-10-PCS | Mod: CPTII,,, | Performed by: STUDENT IN AN ORGANIZED HEALTH CARE EDUCATION/TRAINING PROGRAM

## 2022-11-07 PROCEDURE — 3079F DIAST BP 80-89 MM HG: CPT | Mod: CPTII,,, | Performed by: STUDENT IN AN ORGANIZED HEALTH CARE EDUCATION/TRAINING PROGRAM

## 2022-11-07 PROCEDURE — 1159F PR MEDICATION LIST DOCUMENTED IN MEDICAL RECORD: ICD-10-PCS | Mod: CPTII,,, | Performed by: STUDENT IN AN ORGANIZED HEALTH CARE EDUCATION/TRAINING PROGRAM

## 2022-11-07 PROCEDURE — 99215 PR OFFICE/OUTPT VISIT, EST, LEVL V, 40-54 MIN: ICD-10-PCS | Mod: ,,, | Performed by: STUDENT IN AN ORGANIZED HEALTH CARE EDUCATION/TRAINING PROGRAM

## 2022-11-07 PROCEDURE — 3079F PR MOST RECENT DIASTOLIC BLOOD PRESSURE 80-89 MM HG: ICD-10-PCS | Mod: CPTII,,, | Performed by: STUDENT IN AN ORGANIZED HEALTH CARE EDUCATION/TRAINING PROGRAM

## 2022-11-07 PROCEDURE — 3077F SYST BP >= 140 MM HG: CPT | Mod: CPTII,,, | Performed by: STUDENT IN AN ORGANIZED HEALTH CARE EDUCATION/TRAINING PROGRAM

## 2022-11-07 NOTE — PROGRESS NOTES
Referring provider: Dr. Sauceda  Reason for consult:  High-grade glioma     Treatment history   11/02/2022-current:  Concurrent chemo RT with temozolomide 75 mg per m2 days daily      Patient is a 32-year-old with no past medical history who presented to the ER at [facility] on 09/20/2022 with symptoms of nausea, vomiting and headaches.  He had a CT of his head done in the ER which showed a right frontal lobe mass concerning for malignancy.  He was transferred to [facility] for neurosurgical consultation.  He underwent a right frontoparietal craniotomy with near total resection of the frontal lobe mass, pathology from which revealed high-grade glioma pending further classification.  Patient was discharged from hospital soon after his resection without any complications.    He presented to clinic on 10/05/2022 to establish care to discuss further disease management.      Today, 11/7/22, patient reports tolerating concurrent chemo RT with Temodar well.  He denies any nausea since starting his medications.  He reports taking Bactrim Monday Wednesday Friday as prescribed and reports compliance with it.  Denies any fevers, chills, headaches, vision changes, focal weakness, nausea vomiting since his last follow-up visit.      ECOG 0, worked in [employer] prior to his presentation, lives with his father, denies any family history of malignancy.  Former smoker, quit for many years, denies alcohol or recreational drug use.    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    Imaging   09/21/2022 MRI brain with and without contrast:  Findings consistent with recent craniotomy in the right frontal region for resection of a hemorrhagic mass with some persistent enhancement seen along with the mesial margin of the surgical bed suggesting some residual mass.  Fluid seen in the surgical bed with subdural hematoma seen on the right side as well as with  air seen on craniotomy site.  Midline shift from right to left, intraventricular hemorrhage seen in the right lateral ventricle.    Laboratory  11/06/2022:  Creatinine 1.04, albumin 4.2, calcium 10.5, alkaline phosphatase 69, total bili 1.2, AST 26, ALT 54, WBC count 7.95, hemoglobin 15.1, MCV 87.6, platelet count 523, ANC 5.16      Past Medical History:   Diagnosis Date    Brain cancer        Past Surgical History:   Procedure Laterality Date    CRANIOTOMY FOR EXCISION OF INTRACRANIAL TUMOR      VENTRICULOSTOMY Left 9/20/2022    Procedure: VENTRICULOSTOMY;  Surgeon: Duglas Fowler MD;  Location: [facility];  Service: Neurosurgery;  Laterality: Left;       Family History   Problem Relation Age of Onset    Hypertension Father        Social History     Socioeconomic History    Marital status: Unknown   Tobacco Use    Smoking status: Never    Smokeless tobacco: Never   Substance and Sexual Activity    Alcohol use: Never    Drug use: Never       Current Outpatient Medications   Medication Sig Dispense Refill    dexAMETHasone (DECADRON) 2 MG tablet Take 1 tablet (2 mg total) by mouth 2 (two) times a day. 60 tablet 1    HYDROcodone-acetaminophen (NORCO) 5-325 mg per tablet Take 1 tablet by mouth every 6 (six) hours as needed for Pain. 28 tablet 0    levETIRAcetam (KEPPRA) 500 MG Tab Take 1 tablet (500 mg total) by mouth 2 (two) times daily. 60 tablet 11    ondansetron (ZOFRAN) 8 MG tablet Take 1 tablet (8 mg total) by mouth every 8 (eight) hours as needed for Nausea. 30 tablet 1    sulfamethoxazole-trimethoprim 800-160mg (BACTRIM DS) 800-160 mg Tab Take 1 tablet by mouth every Mon, Wed, Fri. 20 tablet 0    temozolomide (TEMODAR) 100 MG capsule Take 1 capsule (100 mg total) by mouth once daily 7 days with radiation therapy from first day to last day of radiation. Take on an empty stomach. with 2 other temozolomide prescriptions for 135 mg total. 30 capsule 0    temozolomide (TEMODAR) 20 MG capsule Take 1 capsule (20 mg total)  by mouth once daily 7 days with radiation therapy from first day to last day of radiation. Take on an empty stomach. with 2 other temozolomide prescriptions for 135 mg total. 30 capsule 0    temozolomide (TEMODAR) 5 MG capsule Take 3 capsules (15 mg total) by mouth once daily 7 days with radiation therapy from first day to last day of radiation. Take on an empty stomach. with 2 other temozolomide prescriptions for 135 mg total. 90 capsule 0     No current facility-administered medications for this visit.       Review of patient's allergies indicates:  No Known Allergies    Review of system  CONSTITUTIONAL: no fevers, no chills, no weight loss, no fatigue, no weakness  HEMATOLOGIC: no abnormal bleeding, no abnormal bruising, no drenching night sweats  ONCOLOGIC: no new masses or lumps  HEENT: no vision loss, no tinnitus or hearing loss, no nose bleeding, no dysphagia, no odynophagia  CVS: no chest pain, no palpitations, no dyspnea on exertion  RESP: no shortness of breath, no hemoptysis, no cough  GI: no nausea, no vomiting, no diarrhea, no constipation, no melena, no hematochezia, no hematemesis, no abdominal pain, no increase in abdominal girth  : no dysuria, no hematuria, no hesitancy, no scrotal swelling, no discharge  INTEGUMENT: no rashes, no abnormal bruising, no nail pitting, no hyperpigmentation  NEURO: no falls, no memory loss, no paresthesias or dysesthesias, no urofecal incontinence or retention, no loss of strength on any extremity  MSK: no back pain, no new joint pain, no joint swelling  PSYCH: no suicidal or homicidal ideation, no depression, no insomnia, no anhedonia  ENDOCRINE: no heat or cold intolerance, no polyuria, no polydipsia      Physical Exam:  Vitals:    11/07/22 1401   BP: (!) 158/84   Pulse: 110   Resp: 18   Temp: 97.5 °F (36.4 °C)           ECOG PS 0  GA: AAOx3, NAD  HEENT: NCAT, PERRLA, EOMI, good dentition, moist oral mucous membranes, well-healed incision over the right frontal  region  LYMPH: no cervical, axillary or supraclavicular adenopathy  CVS: s1s2 RRR, no M/R/G  RESP: CTA b/l, no crackles, no wheezes or rhonchi  ABD: soft, NT, ND, BS+, no hepatosplenomegaly  EXT: no deformities, no pedal edema  SKIN: no rashes, warm and dry  NEURO: normal mentation, strength 5/5 on all 4 extremities, no sensory deficits, cranial nerves 2-12 grossly intact      Assessment and plan    # Astrocytoma, WHO grade 4, IDH1 mutated,  ATRX mutated, TP53 mutated MGMT methylation present.   Status post gross total resection on 09/20/2022   Reviewed pathology report, noted grade 4 astrocytoma, IDH1 mutated, ATRX mutated and MGMT methylation present  Discussed with patient and his father the diagnosis, aggressive nature of disease, poor prognosis and treatment recommendations including adjuvant chemoradiation followed by chemotherapy with Temodar.    Discussed the option to explore clinical trials given patient's young age and good ECOG.  Prescription for Temodar 75 mg per m2 days 1-7 concurrently with radiation sent on his initial visit  Patient started concurrent chemo RT on 11/02/2022.  Noted updated pathology report with neuro are expanded panel and integrated diagnosis of astrocytoma, grade 4, IDH mutant.  Will plan for adjuvant temozolomide for 12 months following completion of concurrent chemo RT based on CT on trial      Plan   Continue daily temozolomide 75 mg per m2 concurrently with radiation therapy  Continue One double-strength Bactrim for PJP prophylaxis Monday Wednesday Friday   Plan to follow-up in clinic in 10 days with repeat labs to assess treatment tolerance.  With plan to refer patient to an academic center in the near future to discuss clinical trial enrollment in the event of recurrence.         Portions of the record may have been created with voice recognition software. Occasional wrong-word or sound-a-like substitutions may have occurred due to the inherent limitations of voice  recognition software. Read the chart carefully and recognize, using context, where substitutions have occurred.

## 2022-11-16 NOTE — PROGRESS NOTES
Referring provider: Dr. Sauceda  Reason for consult:  High-grade glioma     Treatment history   11/02/2022-current:  Concurrent chemo RT with temozolomide 75 mg per m2 days daily      Patient is a 32-year-old with no past medical history who [place] [facility] [place] with symptoms of nausea, vomiting and headaches.  He had a CT of his head done in the ER which showed a right frontal lobe mass concerning for malignancy.  He was transferred to [facility] for neurosurgical consultation.  He underwent a right frontoparietal craniotomy with near total resection of the frontal lobe mass, pathology from which revealed high-grade glioma pending further classification.  Patient was discharged from hospital soon after his resection without any complications.    He presented to clinic on 10/05/2022 to establish care to discuss further disease management.      Today, 11/17/22, patient reports tolerating concurrent chemo RT with Temodar well.  He denies any nausea since starting his medications.  He reports taking Bactrim Monday Wednesday Friday as prescribed and reports compliance with it.  Denies any fevers, chills, headaches, vision changes, focal weakness, nausea vomiting since his last follow-up visit.  Patient has been weaned off dexamethasone by [facility].      ECOG 0, worked in [employer] prior to his presentation, lives with his father, denies any family history of malignancy.  Former smoker, quit for many years, denies alcohol or recreational drug use.    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    Imaging   09/21/2022 MRI brain with and without contrast:  Findings consistent with recent craniotomy in the right frontal region for resection of a hemorrhagic mass with some persistent enhancement seen along with the mesial margin of the surgical bed suggesting some residual mass.  Fluid seen in the  surgical bed with subdural hematoma seen on the right side as well as with air seen on craniotomy site.  Midline shift from right to left, intraventricular hemorrhage seen in the right lateral ventricle.    Laboratory  11/15/2022:  Creatinine 1.05, albumin 4.3, calcium 10.5, alk-phos 59, total bili 1.1, AST 30, ALT 54, WBC 8.6, hemoglobin 15.2, MCV 88.7, platelet count 493, ANC 5.21.  ALC 2.29.    11/06/2022:  Creatinine 1.04, albumin 4.2, calcium 10.5, alkaline phosphatase 69, total bili 1.2, AST 26, ALT 54, WBC count 7.95, hemoglobin 15.1, MCV 87.6, platelet count 523, ANC 5.16      Past Medical History:   Diagnosis Date    Brain cancer        Past Surgical History:   Procedure Laterality Date    CRANIOTOMY FOR EXCISION OF INTRACRANIAL TUMOR      VENTRICULOSTOMY Left 9/20/2022    Procedure: VENTRICULOSTOMY;  Surgeon: Duglas Fowler MD;  Location: [facility];  Service: Neurosurgery;  Laterality: Left;       Family History   Problem Relation Age of Onset    Hypertension Father        Social History     Socioeconomic History    Marital status: Unknown   Tobacco Use    Smoking status: Never    Smokeless tobacco: Never   Substance and Sexual Activity    Alcohol use: Never    Drug use: Never       Current Outpatient Medications   Medication Sig Dispense Refill    dexAMETHasone (DECADRON) 2 MG tablet Take 1 tablet (2 mg total) by mouth 2 (two) times a day. 60 tablet 1    HYDROcodone-acetaminophen (NORCO) 5-325 mg per tablet Take 1 tablet by mouth every 6 (six) hours as needed for Pain. 28 tablet 0    levETIRAcetam (KEPPRA) 500 MG Tab Take 1 tablet (500 mg total) by mouth 2 (two) times daily. 60 tablet 11    ondansetron (ZOFRAN) 8 MG tablet Take 1 tablet (8 mg total) by mouth every 8 (eight) hours as needed for Nausea. 30 tablet 1    sulfamethoxazole-trimethoprim 800-160mg (BACTRIM DS) 800-160 mg Tab Take 1 tablet by mouth every Mon, Wed, Fri. 20 tablet 0    temozolomide (TEMODAR) 100 MG capsule Take 1 capsule (100 mg total)  by mouth once daily 7 days with radiation therapy from first day to last day of radiation. Take on an empty stomach. with 2 other temozolomide prescriptions for 135 mg total. 30 capsule 0    temozolomide (TEMODAR) 20 MG capsule Take 1 capsule (20 mg total) by mouth once daily 7 days with radiation therapy from first day to last day of radiation. Take on an empty stomach. with 2 other temozolomide prescriptions for 135 mg total. 30 capsule 0    temozolomide (TEMODAR) 5 MG capsule Take 3 capsules (15 mg total) by mouth once daily 7 days with radiation therapy from first day to last day of radiation. Take on an empty stomach. with 2 other temozolomide prescriptions for 135 mg total. 90 capsule 0     No current facility-administered medications for this visit.       Review of patient's allergies indicates:  No Known Allergies    Review of system  CONSTITUTIONAL: no fevers, no chills, no weight loss, no fatigue, no weakness  HEMATOLOGIC: no abnormal bleeding, no abnormal bruising, no drenching night sweats  ONCOLOGIC: no new masses or lumps  HEENT: no vision loss, no tinnitus or hearing loss, no nose bleeding, no dysphagia, no odynophagia  CVS: no chest pain, no palpitations, no dyspnea on exertion  RESP: no shortness of breath, no hemoptysis, no cough  GI: no nausea, no vomiting, no diarrhea, no constipation, no melena, no hematochezia, no hematemesis, no abdominal pain, no increase in abdominal girth  : no dysuria, no hematuria, no hesitancy, no scrotal swelling, no discharge  INTEGUMENT: no rashes, no abnormal bruising, no nail pitting, no hyperpigmentation  NEURO: no falls, no memory loss, no paresthesias or dysesthesias, no urofecal incontinence or retention, no loss of strength on any extremity  MSK: no back pain, no new joint pain, no joint swelling  PSYCH: no suicidal or homicidal ideation, no depression, no insomnia, no anhedonia  ENDOCRINE: no heat or cold intolerance, no polyuria, no polydipsia      Physical  Exam:  Vitals:    11/17/22 1037   BP: (!) 134/90   Pulse: 88   Resp: 18   Temp: 99 °F (37.2 °C)             ECOG PS 0  GA: AAOx3, NAD  HEENT: NCAT, PERRLA, EOMI, good dentition, moist oral mucous membranes, well-healed incision over the right frontal region  LYMPH: no cervical, axillary or supraclavicular adenopathy  CVS: s1s2 RRR, no M/R/G  RESP: CTA b/l, no crackles, no wheezes or rhonchi  ABD: soft, NT, ND, BS+, no hepatosplenomegaly  EXT: no deformities, no pedal edema  SKIN: no rashes, warm and dry  NEURO: normal mentation, strength 5/5 on all 4 extremities, no sensory deficits, cranial nerves 2-12 grossly intact      Assessment and plan    # Astrocytoma, WHO grade 4, IDH1 mutated,  ATRX mutated, TP53 mutated MGMT methylation present.   Status post gross total resection on 09/20/2022   Reviewed pathology report, noted grade 4 astrocytoma, IDH1 mutated, ATRX mutated and MGMT methylation present  Discussed with patient and his father the diagnosis, aggressive nature of disease, poor prognosis and treatment recommendations including adjuvant chemoradiation followed by chemotherapy with Temodar.    Discussed the option to explore clinical trials given patient's young age and good ECOG.  Prescription for Temodar 75 mg per m2 days 1-7 concurrently with radiation sent on his initial visit  Patient started concurrent chemo RT on 11/02/2022.  Noted updated pathology report with neuro are expanded panel and integrated diagnosis of astrocytoma, grade 4, IDH mutant.  Will plan for adjuvant temozolomide for 12 months following completion of concurrent chemo RT.         Plan   Continue daily temozolomide 75 mg per m2 concurrently with radiation therapy  Continue One double-strength Bactrim for PJP prophylaxis Monday Wednesday Friday   Plan to follow-up in clinic in 14 days with repeat labs to assess treatment tolerance.  With plan to refer patient to an academic center in the near future to discuss clinical trial enrollment  in the event of recurrence.         Portions of the record may have been created with voice recognition software. Occasional wrong-word or sound-a-like substitutions may have occurred due to the inherent limitations of voice recognition software. Read the chart carefully and recognize, using context, where substitutions have occurred.

## 2022-11-17 ENCOUNTER — OFFICE VISIT (OUTPATIENT)
Dept: HEMATOLOGY/ONCOLOGY | Facility: CLINIC | Age: 32
End: 2022-11-17
Payer: COMMERCIAL

## 2022-11-17 VITALS
HEART RATE: 88 BPM | SYSTOLIC BLOOD PRESSURE: 134 MMHG | TEMPERATURE: 99 F | RESPIRATION RATE: 18 BRPM | WEIGHT: 163 LBS | BODY MASS INDEX: 22.08 KG/M2 | DIASTOLIC BLOOD PRESSURE: 90 MMHG | OXYGEN SATURATION: 99 % | HEIGHT: 72 IN

## 2022-11-17 DIAGNOSIS — C71.9 GRADE IV ASTROCYTOMA: Primary | ICD-10-CM

## 2022-11-17 DIAGNOSIS — Z51.11 ENCOUNTER FOR ANTINEOPLASTIC CHEMOTHERAPY: ICD-10-CM

## 2022-11-17 PROCEDURE — 1159F PR MEDICATION LIST DOCUMENTED IN MEDICAL RECORD: ICD-10-PCS | Mod: CPTII,,, | Performed by: STUDENT IN AN ORGANIZED HEALTH CARE EDUCATION/TRAINING PROGRAM

## 2022-11-17 PROCEDURE — 3080F DIAST BP >= 90 MM HG: CPT | Mod: CPTII,,, | Performed by: STUDENT IN AN ORGANIZED HEALTH CARE EDUCATION/TRAINING PROGRAM

## 2022-11-17 PROCEDURE — 99215 PR OFFICE/OUTPT VISIT, EST, LEVL V, 40-54 MIN: ICD-10-PCS | Mod: ,,, | Performed by: STUDENT IN AN ORGANIZED HEALTH CARE EDUCATION/TRAINING PROGRAM

## 2022-11-17 PROCEDURE — 3075F SYST BP GE 130 - 139MM HG: CPT | Mod: CPTII,,, | Performed by: STUDENT IN AN ORGANIZED HEALTH CARE EDUCATION/TRAINING PROGRAM

## 2022-11-17 PROCEDURE — 99215 OFFICE O/P EST HI 40 MIN: CPT | Mod: ,,, | Performed by: STUDENT IN AN ORGANIZED HEALTH CARE EDUCATION/TRAINING PROGRAM

## 2022-11-17 PROCEDURE — 3008F BODY MASS INDEX DOCD: CPT | Mod: CPTII,,, | Performed by: STUDENT IN AN ORGANIZED HEALTH CARE EDUCATION/TRAINING PROGRAM

## 2022-11-17 PROCEDURE — 3008F PR BODY MASS INDEX (BMI) DOCUMENTED: ICD-10-PCS | Mod: CPTII,,, | Performed by: STUDENT IN AN ORGANIZED HEALTH CARE EDUCATION/TRAINING PROGRAM

## 2022-11-17 PROCEDURE — 3080F PR MOST RECENT DIASTOLIC BLOOD PRESSURE >= 90 MM HG: ICD-10-PCS | Mod: CPTII,,, | Performed by: STUDENT IN AN ORGANIZED HEALTH CARE EDUCATION/TRAINING PROGRAM

## 2022-11-17 PROCEDURE — 1159F MED LIST DOCD IN RCRD: CPT | Mod: CPTII,,, | Performed by: STUDENT IN AN ORGANIZED HEALTH CARE EDUCATION/TRAINING PROGRAM

## 2022-11-17 PROCEDURE — 3075F PR MOST RECENT SYSTOLIC BLOOD PRESS GE 130-139MM HG: ICD-10-PCS | Mod: CPTII,,, | Performed by: STUDENT IN AN ORGANIZED HEALTH CARE EDUCATION/TRAINING PROGRAM

## 2022-11-30 ENCOUNTER — TELEPHONE (OUTPATIENT)
Dept: NEUROSURGERY | Facility: CLINIC | Age: 32
End: 2022-11-30
Payer: COMMERCIAL

## 2022-12-01 ENCOUNTER — OFFICE VISIT (OUTPATIENT)
Dept: HEMATOLOGY/ONCOLOGY | Facility: CLINIC | Age: 32
End: 2022-12-01
Payer: COMMERCIAL

## 2022-12-01 VITALS
HEART RATE: 76 BPM | HEIGHT: 72 IN | TEMPERATURE: 98 F | BODY MASS INDEX: 21.56 KG/M2 | RESPIRATION RATE: 20 BRPM | SYSTOLIC BLOOD PRESSURE: 143 MMHG | DIASTOLIC BLOOD PRESSURE: 96 MMHG | WEIGHT: 159.19 LBS | OXYGEN SATURATION: 98 %

## 2022-12-01 DIAGNOSIS — C71.9 GRADE IV ASTROCYTOMA: Primary | ICD-10-CM

## 2022-12-01 PROCEDURE — 3077F PR MOST RECENT SYSTOLIC BLOOD PRESSURE >= 140 MM HG: ICD-10-PCS | Mod: CPTII,,,

## 2022-12-01 PROCEDURE — 99214 OFFICE O/P EST MOD 30 MIN: CPT | Mod: ,,,

## 2022-12-01 PROCEDURE — 3080F PR MOST RECENT DIASTOLIC BLOOD PRESSURE >= 90 MM HG: ICD-10-PCS | Mod: CPTII,,,

## 2022-12-01 PROCEDURE — 3008F BODY MASS INDEX DOCD: CPT | Mod: CPTII,,,

## 2022-12-01 PROCEDURE — 99214 PR OFFICE/OUTPT VISIT, EST, LEVL IV, 30-39 MIN: ICD-10-PCS | Mod: ,,,

## 2022-12-01 PROCEDURE — 3077F SYST BP >= 140 MM HG: CPT | Mod: CPTII,,,

## 2022-12-01 PROCEDURE — 1159F MED LIST DOCD IN RCRD: CPT | Mod: CPTII,,,

## 2022-12-01 PROCEDURE — 1159F PR MEDICATION LIST DOCUMENTED IN MEDICAL RECORD: ICD-10-PCS | Mod: CPTII,,,

## 2022-12-01 PROCEDURE — 3080F DIAST BP >= 90 MM HG: CPT | Mod: CPTII,,,

## 2022-12-01 PROCEDURE — 3008F PR BODY MASS INDEX (BMI) DOCUMENTED: ICD-10-PCS | Mod: CPTII,,,

## 2022-12-01 RX ORDER — TEMOZOLOMIDE 100 MG/1
100 CAPSULE ORAL DAILY
COMMUNITY
End: 2022-12-15

## 2022-12-01 NOTE — PROGRESS NOTES
Referring provider: Dr. Sauceda  Reason for consult:  High-grade glioma     Treatment history   11/02/2022-current:  Concurrent chemo RT with temozolomide 75 mg per m2 days daily      Patient is a 32-year-old with no past medical history who [place] [facility] [place] with symptoms of nausea, vomiting and headaches.  He had a CT of his head done in the ER which showed a right frontal lobe mass concerning for malignancy.  He was transferred to [facility] for neurosurgical consultation.  He underwent a right frontoparietal craniotomy with near total resection of the frontal lobe mass, pathology from which revealed high-grade glioma pending further classification.  Patient was discharged from hospital soon after his resection without any complications.    He presented to clinic on 10/05/2022 to establish care to discuss further disease management.      Today, 12/1/22, patient reports tolerating concurrent chemo RT with Temodar well.  He denies any nausea since starting his medications.  He reports taking Bactrim Monday Wednesday Friday as prescribed and reports compliance with it.  Denies any fevers, chills, headaches, vision changes, focal weakness, nausea vomiting since his last follow-up visit.  Patient has been weaned off dexamethasone by [facility] Oncology.      ECOG 0, worked in [employer] prior to his presentation, lives with his father, denies any family history of malignancy.  Former smoker, quit for many years, denies alcohol or recreational drug use.    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    Imaging   09/21/2022 MRI brain with and without contrast:  Findings consistent with recent craniotomy in the right frontal region for resection of a hemorrhagic mass with some persistent enhancement seen along with the mesial margin of the surgical bed suggesting some residual mass.  Fluid seen in the surgical  bed with subdural hematoma seen on the right side as well as with air seen on craniotomy site.  Midline shift from right to left, intraventricular hemorrhage seen in the right lateral ventricle.    Laboratory  11/30/22: cr 1.43, alb 4.0, ca 10, alkphos 66, LFTs WNL, wbc 5.05, hgb 14.9, plt 302, ANC 2.91  11/15/2022:  Creatinine 1.05, albumin 4.3, calcium 10.5, alk-phos 59, total bili 1.1, AST 30, ALT 54, WBC 8.6, hemoglobin 15.2, MCV 88.7, platelet count 493, ANC 5.21.  ALC 2.29.    11/06/2022:  Creatinine 1.04, albumin 4.2, calcium 10.5, alkaline phosphatase 69, total bili 1.2, AST 26, ALT 54, WBC count 7.95, hemoglobin 15.1, MCV 87.6, platelet count 523, ANC 5.16      Past Medical History:   Diagnosis Date    Brain cancer        Past Surgical History:   Procedure Laterality Date    CRANIOTOMY FOR EXCISION OF INTRACRANIAL TUMOR      VENTRICULOSTOMY Left 9/20/2022    Procedure: VENTRICULOSTOMY;  Surgeon: Duglas Fowler MD;  [facility];  Service: Neurosurgery;  Laterality: Left;       Family History   Problem Relation Age of Onset    Hypertension Father        Social History     Socioeconomic History    Marital status: Unknown   Tobacco Use    Smoking status: Never    Smokeless tobacco: Never   Substance and Sexual Activity    Alcohol use: Never    Drug use: Never       Current Outpatient Medications   Medication Sig Dispense Refill    levETIRAcetam (KEPPRA) 500 MG Tab Take 1 tablet (500 mg total) by mouth 2 (two) times daily. 60 tablet 11    sulfamethoxazole-trimethoprim 800-160mg (BACTRIM DS) 800-160 mg Tab Take 1 tablet by mouth every Mon, Wed, Fri. 20 tablet 0    temozolomide (TEMODAR) 100 MG capsule Take 100 mg by mouth once daily Taken 1 100mg and 1 20mg and 3 of 5mg.      dexAMETHasone (DECADRON) 2 MG tablet Take 1 tablet (2 mg total) by mouth 2 (two) times a day. (Patient not taking: Reported on 12/1/2022) 60 tablet 1    HYDROcodone-acetaminophen (NORCO) 5-325 mg per tablet Take 1 tablet by mouth every  6 (six) hours as needed for Pain. (Patient not taking: Reported on 12/1/2022) 28 tablet 0    ondansetron (ZOFRAN) 8 MG tablet Take 1 tablet (8 mg total) by mouth every 8 (eight) hours as needed for Nausea. (Patient not taking: Reported on 12/1/2022) 30 tablet 1     No current facility-administered medications for this visit.       Review of patient's allergies indicates:  No Known Allergies    Review of system  CONSTITUTIONAL: no fevers, no chills, no weight loss, no fatigue, no weakness  HEMATOLOGIC: no abnormal bleeding, no abnormal bruising, no drenching night sweats  ONCOLOGIC: no new masses or lumps  HEENT: no vision loss, no tinnitus or hearing loss, no nose bleeding, no dysphagia, no odynophagia  CVS: no chest pain, no palpitations, no dyspnea on exertion  RESP: no shortness of breath, no hemoptysis, no cough  GI: no nausea, no vomiting, no diarrhea, no constipation, no melena, no hematochezia, no hematemesis, no abdominal pain, no increase in abdominal girth  : no dysuria, no hematuria, no hesitancy, no scrotal swelling, no discharge  INTEGUMENT: no rashes, no abnormal bruising, no nail pitting, no hyperpigmentation  NEURO: no falls, no memory loss, no paresthesias or dysesthesias, no urofecal incontinence or retention, no loss of strength on any extremity  MSK: no back pain, no new joint pain, no joint swelling  PSYCH: no suicidal or homicidal ideation, no depression, no insomnia, no anhedonia  ENDOCRINE: no heat or cold intolerance, no polyuria, no polydipsia      Physical Exam:  Vitals:    12/01/22 1457   BP: (!) 143/96   Pulse: 76   Resp: 20   Temp: 98.4 °F (36.9 °C)             ECOG PS 0  GA: AAOx3, NAD  HEENT: NCAT, PERRLA, EOMI, good dentition, moist oral mucous membranes, well-healed incision over the right frontal region  LYMPH: no cervical, axillary or supraclavicular adenopathy  CVS: s1s2 RRR, no M/R/G  RESP: CTA b/l, no crackles, no wheezes or rhonchi  ABD: soft, NT, ND, BS+, no  hepatosplenomegaly  EXT: no deformities, no pedal edema  SKIN: no rashes, warm and dry  NEURO: normal mentation, strength 5/5 on all 4 extremities, no sensory deficits, cranial nerves 2-12 grossly intact      Assessment and plan    # Astrocytoma, WHO grade 4, IDH1 mutated,  ATRX mutated, TP53 mutated MGMT methylation present.   Status post gross total resection on 09/20/2022   Reviewed pathology report, noted grade 4 astrocytoma, IDH1 mutated, ATRX mutated and MGMT methylation present  Discussed with patient and his father the diagnosis, aggressive nature of disease, poor prognosis and treatment recommendations including adjuvant chemoradiation followed by chemotherapy with Temodar.    Discussed the option to explore clinical trials given patient's young age and good ECOG.  Prescription for Temodar 75 mg per m2 days 1-7 concurrently with radiation sent on his initial visit  Patient started concurrent chemo RT on 11/02/2022.  Noted updated pathology report with neuro are expanded panel and integrated diagnosis of astrocytoma, grade 4, IDH mutant.  Will plan for adjuvant temozolomide for 12 months following completion of concurrent chemo RT.         Plan   Continue daily temozolomide 75 mg per m2 concurrently with radiation therapy  Continue One double-strength Bactrim for PJP prophylaxis Monday Wednesday Friday   Increase fluid intake for cr 1.43, also recommend bananas daily for K+ 3.4  Plan to follow-up in clinic in 14 days with repeat labs to assess treatment tolerance.  With plan to refer patient to an academic center in the near future to discuss clinical trial enrollment in the event of recurrence.         WADE Florez

## 2022-12-15 ENCOUNTER — TELEPHONE (OUTPATIENT)
Dept: HEMATOLOGY/ONCOLOGY | Facility: CLINIC | Age: 32
End: 2022-12-15

## 2022-12-15 ENCOUNTER — TELEPHONE (OUTPATIENT)
Dept: NEUROSURGERY | Facility: CLINIC | Age: 32
End: 2022-12-15
Payer: COMMERCIAL

## 2022-12-15 ENCOUNTER — OFFICE VISIT (OUTPATIENT)
Dept: HEMATOLOGY/ONCOLOGY | Facility: CLINIC | Age: 32
End: 2022-12-15
Payer: COMMERCIAL

## 2022-12-15 VITALS
BODY MASS INDEX: 21.59 KG/M2 | HEIGHT: 72 IN | SYSTOLIC BLOOD PRESSURE: 153 MMHG | DIASTOLIC BLOOD PRESSURE: 81 MMHG | RESPIRATION RATE: 18 BRPM | TEMPERATURE: 99 F | HEART RATE: 68 BPM

## 2022-12-15 DIAGNOSIS — Z51.11 ENCOUNTER FOR ANTINEOPLASTIC CHEMOTHERAPY: ICD-10-CM

## 2022-12-15 DIAGNOSIS — C71.9 GBM (GLIOBLASTOMA MULTIFORME): ICD-10-CM

## 2022-12-15 DIAGNOSIS — C71.9 GRADE IV ASTROCYTOMA: Primary | ICD-10-CM

## 2022-12-15 PROCEDURE — 3079F DIAST BP 80-89 MM HG: CPT | Mod: CPTII,,, | Performed by: STUDENT IN AN ORGANIZED HEALTH CARE EDUCATION/TRAINING PROGRAM

## 2022-12-15 PROCEDURE — 3077F PR MOST RECENT SYSTOLIC BLOOD PRESSURE >= 140 MM HG: ICD-10-PCS | Mod: CPTII,,, | Performed by: STUDENT IN AN ORGANIZED HEALTH CARE EDUCATION/TRAINING PROGRAM

## 2022-12-15 PROCEDURE — 1159F MED LIST DOCD IN RCRD: CPT | Mod: CPTII,,, | Performed by: STUDENT IN AN ORGANIZED HEALTH CARE EDUCATION/TRAINING PROGRAM

## 2022-12-15 PROCEDURE — 3008F PR BODY MASS INDEX (BMI) DOCUMENTED: ICD-10-PCS | Mod: CPTII,,, | Performed by: STUDENT IN AN ORGANIZED HEALTH CARE EDUCATION/TRAINING PROGRAM

## 2022-12-15 PROCEDURE — 3008F BODY MASS INDEX DOCD: CPT | Mod: CPTII,,, | Performed by: STUDENT IN AN ORGANIZED HEALTH CARE EDUCATION/TRAINING PROGRAM

## 2022-12-15 PROCEDURE — 3079F PR MOST RECENT DIASTOLIC BLOOD PRESSURE 80-89 MM HG: ICD-10-PCS | Mod: CPTII,,, | Performed by: STUDENT IN AN ORGANIZED HEALTH CARE EDUCATION/TRAINING PROGRAM

## 2022-12-15 PROCEDURE — 99215 OFFICE O/P EST HI 40 MIN: CPT | Mod: ,,, | Performed by: STUDENT IN AN ORGANIZED HEALTH CARE EDUCATION/TRAINING PROGRAM

## 2022-12-15 PROCEDURE — 3077F SYST BP >= 140 MM HG: CPT | Mod: CPTII,,, | Performed by: STUDENT IN AN ORGANIZED HEALTH CARE EDUCATION/TRAINING PROGRAM

## 2022-12-15 PROCEDURE — 99215 PR OFFICE/OUTPT VISIT, EST, LEVL V, 40-54 MIN: ICD-10-PCS | Mod: ,,, | Performed by: STUDENT IN AN ORGANIZED HEALTH CARE EDUCATION/TRAINING PROGRAM

## 2022-12-15 PROCEDURE — 1159F PR MEDICATION LIST DOCUMENTED IN MEDICAL RECORD: ICD-10-PCS | Mod: CPTII,,, | Performed by: STUDENT IN AN ORGANIZED HEALTH CARE EDUCATION/TRAINING PROGRAM

## 2022-12-15 RX ORDER — TEMOZOLOMIDE 250 MG/1
250 CAPSULE ORAL DAILY
Qty: 5 CAPSULE | Refills: 6 | Status: SHIPPED | OUTPATIENT
Start: 2023-01-16 | End: 2023-01-21

## 2022-12-15 RX ORDER — TEMOZOLOMIDE 20 MG/1
40 CAPSULE ORAL DAILY
Qty: 10 CAPSULE | Refills: 6 | Status: SHIPPED | OUTPATIENT
Start: 2023-01-16 | End: 2023-01-21

## 2022-12-15 NOTE — PROGRESS NOTES
Referring provider: Dr. Sauceda  Reason for consult:  High-grade glioma     Treatment history   11/02/2022-12/15/2022:  Concurrent chemo RT with temozolomide 75 mg per m2 days daily      Patient is a 32-year-old with no past medical history who presented to the [place] at [facility] [place]20/2022 with symptoms of nausea, vomiting and headaches.  He had a CT of his head done in the ER which showed a right frontal lobe mass concerning for malignancy.  He was transferred to [facility] for neurosurgical consultation.  He underwent a right frontoparietal craniotomy with near total resection of the frontal lobe mass, pathology from which revealed high-grade glioma pending further classification.  Patient was discharged from hospital soon after his resection without any complications.    He presented to clinic on 10/05/2022 to establish care to discuss further disease management.      Today, 12/15/2022, patient denies any acute concerns today.  He reports tolerating his concurrent chemo RT with Temodar fairly well he does report mild decrease in his appetite but denies any nausea, vomiting or weight loss.  He denies any fevers, chills and reports compliance with his medications.    ECOG 0, worked in [employer] prior to his presentation, lives with his father, denies any family history of malignancy.  Former smoker, quit for many years, denies alcohol or recreational drug use.    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    Imaging   09/21/2022 MRI brain with and without contrast:  Findings consistent with recent craniotomy in the right frontal region for resection of a hemorrhagic mass with some persistent enhancement seen along with the mesial margin of the surgical bed suggesting some residual mass.  Fluid seen in the surgical bed with subdural hematoma seen on the right side as well as with air seen on craniotomy site.  Midline shift from  right to left, intraventricular hemorrhage seen in the right lateral ventricle.    Laboratory  12/14/2022:  Creatinine 1.53, albumin 4.1, calcium 9.6, LFTs within normal limits, WBC count 3.9, hemoglobin 14.4, MCV 87, platelet count 358, ANC 2.01  11/30/22: cr 1.43, alb 4.0, ca 10, alkphos 66, LFTs WNL, wbc 5.05, hgb 14.9, plt 302, ANC 2.91  11/15/2022:  Creatinine 1.05, albumin 4.3, calcium 10.5, alk-phos 59, total bili 1.1, AST 30, ALT 54, WBC 8.6, hemoglobin 15.2, MCV 88.7, platelet count 493, ANC 5.21.  ALC 2.29.    11/06/2022:  Creatinine 1.04, albumin 4.2, calcium 10.5, alkaline phosphatase 69, total bili 1.2, AST 26, ALT 54, WBC count 7.95, hemoglobin 15.1, MCV 87.6, platelet count 523, ANC 5.16      Past Medical History:   Diagnosis Date    Brain cancer        Past Surgical History:   Procedure Laterality Date    CRANIOTOMY FOR EXCISION OF INTRACRANIAL TUMOR      VENTRICULOSTOMY Left 9/20/2022    Procedure: VENTRICULOSTOMY;  Surgeon: Duglas Fowler MD;  Location: [facility];  Service: Neurosurgery;  Laterality: Left;       Family History   Problem Relation Age of Onset    Hypertension Father        Social History     Socioeconomic History    Marital status: Unknown   Tobacco Use    Smoking status: Never    Smokeless tobacco: Never   Substance and Sexual Activity    Alcohol use: Never    Drug use: Never       Current Outpatient Medications   Medication Sig Dispense Refill    dexAMETHasone (DECADRON) 2 MG tablet Take 1 tablet (2 mg total) by mouth 2 (two) times a day. 60 tablet 1    HYDROcodone-acetaminophen (NORCO) 5-325 mg per tablet Take 1 tablet by mouth every 6 (six) hours as needed for Pain. 28 tablet 0    levETIRAcetam (KEPPRA) 500 MG Tab Take 1 tablet (500 mg total) by mouth 2 (two) times daily. 60 tablet 11    ondansetron (ZOFRAN) 8 MG tablet Take 1 tablet (8 mg total) by mouth every 8 (eight) hours as needed for Nausea. 30 tablet 1    sulfamethoxazole-trimethoprim 800-160mg (BACTRIM DS) 800-160 mg Tab  Take 1 tablet by mouth every Mon, Wed, Fri. 20 tablet 0    temozolomide (TEMODAR) 100 MG capsule TAKE 1 CAPSULE BY MOUTH ONCE DAILY FOR 6 WEEKS WITH RADIATION 21 capsule 1    temozolomide (TEMODAR) 100 MG capsule Take 100 mg by mouth once daily Taken 1 100mg and 1 20mg and 3 of 5mg.      temozolomide (TEMODAR) 20 MG capsule TAKE 1 CAPSULE BY MOUTH ONCE DAILY 21 capsule 1    temozolomide (TEMODAR) 5 MG capsule TAKE 3 CAPSULES BY MOUTH ONCE DAILY FOR 6 WEEKS WITH RADIATION THERAPY 63 capsule 1     No current facility-administered medications for this visit.       Review of patient's allergies indicates:  No Known Allergies    Review of system  CONSTITUTIONAL: no fevers, no chills, no weight loss, no fatigue, no weakness  HEMATOLOGIC: no abnormal bleeding, no abnormal bruising, no drenching night sweats  ONCOLOGIC: no new masses or lumps  HEENT: no vision loss, no tinnitus or hearing loss, no nose bleeding, no dysphagia, no odynophagia  CVS: no chest pain, no palpitations, no dyspnea on exertion  RESP: no shortness of breath, no hemoptysis, no cough  GI: no nausea, no vomiting, no diarrhea, no constipation, no melena, no hematochezia, no hematemesis, no abdominal pain, no increase in abdominal girth  : no dysuria, no hematuria, no hesitancy, no scrotal swelling, no discharge  INTEGUMENT: no rashes, no abnormal bruising, no nail pitting, no hyperpigmentation  NEURO: no falls, no memory loss, no paresthesias or dysesthesias, no urofecal incontinence or retention, no loss of strength on any extremity  MSK: no back pain, no new joint pain, no joint swelling  PSYCH: no suicidal or homicidal ideation, no depression, no insomnia, no anhedonia  ENDOCRINE: no heat or cold intolerance, no polyuria, no polydipsia      Physical Exam:  There were no vitals filed for this visit.            ECOG PS 0  GA: AAOx3, NAD  HEENT: NCAT, PERRLA, EOMI, good dentition, moist oral mucous membranes, well-healed incision over the right frontal  region  LYMPH: no cervical, axillary or supraclavicular adenopathy  CVS: s1s2 RRR, no M/R/G  RESP: CTA b/l, no crackles, no wheezes or rhonchi  ABD: soft, NT, ND, BS+, no hepatosplenomegaly  EXT: no deformities, no pedal edema  SKIN: no rashes, warm and dry  NEURO: normal mentation, strength 5/5 on all 4 extremities, no sensory deficits, cranial nerves 2-12 grossly intact      Assessment and plan    # Astrocytoma, WHO grade 4, IDH1 mutated,  ATRX mutated, TP53 mutated MGMT methylation present.   Status post gross total resection on 09/20/2022   Reviewed pathology report, noted grade 4 astrocytoma, IDH1 mutated, ATRX mutated and MGMT methylation present  Discussed with patient and his father the diagnosis, aggressive nature of disease, poor prognosis and treatment recommendations including adjuvant chemoradiation followed by chemotherapy with Temodar.    Discussed the option to explore clinical trials given patient's young age and good ECOG.  Prescription for Temodar 75 mg per m2 days 1-7 concurrently with radiation sent on his initial visit  Patient started concurrent chemo RT on 11/02/2022.  Noted updated pathology report with neuro are expanded panel and integrated diagnosis of astrocytoma, grade 4, IDH mutant.  Will plan for adjuvant temozolomide for 12 months following completion of concurrent chemo RT.         Plan   Will plan to start patient on temozolomide 150 mg per m2 p.o. daily on days 1-5 Q 28 days starting 01/16/2023.  Prescription sent to pharmacy for authorization.  Patient will discontinue Bactrim which is likely causing elevation of his creatinine.  Plan to follow-up in clinic on 01/16/2023 with labs prior.  With plan to refer patient to an academic center in the near future to discuss clinical trial enrollment in the event of recurrence.       A total of  40 minutes were spent in review of records, interpretation of test, coordination of care, discussion and counseling with the patient.       Portions of the record may have been created with voice recognition software. Occasional wrong-word or sound-a-like substitutions may have occurred due to the inherent limitations of voice recognition software. Read the chart carefully and recognize, using context, where substitutions have occurred.

## 2022-12-19 ENCOUNTER — SPECIALTY PHARMACY (OUTPATIENT)
Dept: PHARMACY | Facility: CLINIC | Age: 32
End: 2022-12-19
Payer: COMMERCIAL

## 2022-12-19 NOTE — TELEPHONE ENCOUNTER
Received rx for temozolomide for astrocytoma, adjuvant therapy. Dose appropriate. PA already on file, approved until 10/21/2023    Temozolomide 250 mg - estimated copay $0  Temozolomide 20 mg - refill too soon until after 12/25    Will forward for initial consultation to call patient on 12/27 to set up shipment

## 2022-12-19 NOTE — TELEPHONE ENCOUNTER
Tina, this is Raina Pressley with [facility].  We are working on your prescription that your doctor has sent us. We will be working with your insurance to get this approved for you. We will be calling you along the way with updates on your medication.  If you have any questions, you can reach us at [phone].    Welcome call outcome: Left voicemail

## 2022-12-19 NOTE — TELEPHONE ENCOUNTER
Refill too soon until after 12/25. Will pend out initial to 12/27. Therapy planned to start on 01/16/2023

## 2022-12-28 NOTE — TELEPHONE ENCOUNTER
Incoming call from patient inquiring about co-pay for temozolomide. Advised that [facility] is not in network with his insurance and he must fill with [facility]. Advised to reach out to [facility] to verify co-pay amount. He stated that he reached out to [facility] and they informed him that they do not have a prescription for him. Per chart notes, [facility] messaged provider to resend prescription to [facility]. Advised to allow some time for [facility] to receive prescription and follow up if not received by the end of the week.

## 2022-12-28 NOTE — TELEPHONE ENCOUNTER
Incoming call from pt returning call to [facility]. Informed of [facility] lock in and provided phone number. Pt voiced understanding.

## 2022-12-28 NOTE — TELEPHONE ENCOUNTER
Patient is locked into [facility]. Will message MD to re-send prescription to [facility]. Left message for patient. [facility] phone # [phone]    Closing out [facility] encounter

## 2022-12-29 RX ORDER — LEVETIRACETAM 500 MG/1
500 TABLET ORAL 2 TIMES DAILY
Qty: 60 TABLET | Refills: 11 | Status: SHIPPED | OUTPATIENT
Start: 2022-12-29 | End: 2023-12-29

## 2022-12-29 NOTE — TELEPHONE ENCOUNTER
The patient called back.  He is aware that he needs to remain on Keppra.  He asked for a refill, which was sent to the pharmacy.  He has finished radiation and reports to have tolerated it well.  He only experienced nausea before his last few treatments.  He is scheduled for another MRI in January.  I will see if Dr. Fowler wants to review and call with results or have him come in for follow up after the scan.

## 2023-01-17 ENCOUNTER — OFFICE VISIT (OUTPATIENT)
Dept: HEMATOLOGY/ONCOLOGY | Facility: CLINIC | Age: 33
End: 2023-01-17
Payer: COMMERCIAL

## 2023-01-17 VITALS
TEMPERATURE: 99 F | HEIGHT: 72 IN | SYSTOLIC BLOOD PRESSURE: 135 MMHG | BODY MASS INDEX: 20.65 KG/M2 | OXYGEN SATURATION: 99 % | HEART RATE: 67 BPM | DIASTOLIC BLOOD PRESSURE: 91 MMHG | WEIGHT: 152.5 LBS | RESPIRATION RATE: 18 BRPM

## 2023-01-17 DIAGNOSIS — C71.9 GRADE IV ASTROCYTOMA: ICD-10-CM

## 2023-01-17 DIAGNOSIS — C71.9 HIGH GRADE GLIOMA NOT CLASSIFIABLE BY WHO CRITERIA: ICD-10-CM

## 2023-01-17 DIAGNOSIS — Z51.11 ENCOUNTER FOR ANTINEOPLASTIC CHEMOTHERAPY: Primary | ICD-10-CM

## 2023-01-17 PROCEDURE — 1159F PR MEDICATION LIST DOCUMENTED IN MEDICAL RECORD: ICD-10-PCS | Mod: CPTII,,, | Performed by: STUDENT IN AN ORGANIZED HEALTH CARE EDUCATION/TRAINING PROGRAM

## 2023-01-17 PROCEDURE — 3080F DIAST BP >= 90 MM HG: CPT | Mod: CPTII,,, | Performed by: STUDENT IN AN ORGANIZED HEALTH CARE EDUCATION/TRAINING PROGRAM

## 2023-01-17 PROCEDURE — 3080F PR MOST RECENT DIASTOLIC BLOOD PRESSURE >= 90 MM HG: ICD-10-PCS | Mod: CPTII,,, | Performed by: STUDENT IN AN ORGANIZED HEALTH CARE EDUCATION/TRAINING PROGRAM

## 2023-01-17 PROCEDURE — 3008F BODY MASS INDEX DOCD: CPT | Mod: CPTII,,, | Performed by: STUDENT IN AN ORGANIZED HEALTH CARE EDUCATION/TRAINING PROGRAM

## 2023-01-17 PROCEDURE — 3075F SYST BP GE 130 - 139MM HG: CPT | Mod: CPTII,,, | Performed by: STUDENT IN AN ORGANIZED HEALTH CARE EDUCATION/TRAINING PROGRAM

## 2023-01-17 PROCEDURE — 99215 OFFICE O/P EST HI 40 MIN: CPT | Mod: ,,, | Performed by: STUDENT IN AN ORGANIZED HEALTH CARE EDUCATION/TRAINING PROGRAM

## 2023-01-17 PROCEDURE — 3008F PR BODY MASS INDEX (BMI) DOCUMENTED: ICD-10-PCS | Mod: CPTII,,, | Performed by: STUDENT IN AN ORGANIZED HEALTH CARE EDUCATION/TRAINING PROGRAM

## 2023-01-17 PROCEDURE — 3075F PR MOST RECENT SYSTOLIC BLOOD PRESS GE 130-139MM HG: ICD-10-PCS | Mod: CPTII,,, | Performed by: STUDENT IN AN ORGANIZED HEALTH CARE EDUCATION/TRAINING PROGRAM

## 2023-01-17 PROCEDURE — 99215 PR OFFICE/OUTPT VISIT, EST, LEVL V, 40-54 MIN: ICD-10-PCS | Mod: ,,, | Performed by: STUDENT IN AN ORGANIZED HEALTH CARE EDUCATION/TRAINING PROGRAM

## 2023-01-17 PROCEDURE — 1159F MED LIST DOCD IN RCRD: CPT | Mod: CPTII,,, | Performed by: STUDENT IN AN ORGANIZED HEALTH CARE EDUCATION/TRAINING PROGRAM

## 2023-01-17 RX ORDER — POTASSIUM CHLORIDE 20 MEQ/1
40 TABLET, EXTENDED RELEASE ORAL 2 TIMES DAILY
Qty: 8 TABLET | Refills: 0 | Status: SHIPPED | OUTPATIENT
Start: 2023-01-17 | End: 2023-01-19

## 2023-01-17 RX ORDER — SULFAMETHOXAZOLE AND TRIMETHOPRIM 800; 160 MG/1; MG/1
1 TABLET ORAL
Qty: 20 TABLET | Refills: 0 | Status: SHIPPED | OUTPATIENT
Start: 2023-01-18 | End: 2023-03-16 | Stop reason: SDUPTHER

## 2023-01-17 NOTE — PROGRESS NOTES
Referring provider: Dr. Sauceda  Reason for consult:  High-grade glioma     Treatment history   11/02/2022-12/15/2022:  Concurrent chemo RT with temozolomide 75 mg per m2 days daily  1/17/22- current: Temozolomide 150 mg/m2 day 1-5 q 28 days    Patient is a 32-year-old with no past medical history who presented [place] the [place] at [facility] [place] on [place]20/2022 with symptoms of nausea, vomiting and headaches.  He had a CT of his head done in the ER which showed a right frontal lobe mass concerning for malignancy.  He was transferred to [facility] for neurosurgical consultation.  He underwent a right frontoparietal craniotomy with near total resection of the frontal lobe mass, pathology from which revealed high-grade glioma pending further classification.  Patient was discharged from hospital soon after his resection without any complications.    He presented to clinic on 10/05/2022 to establish care to discuss further disease management.      Today, 1/17/23, patient denies any acute concerns since last follow-up visit with us.  Patient is scheduled for an MRI next week with Radiation Oncology.  He denies any fevers, chills, headaches, vision changes or focal weakness.  He denies any shortness of breath, cough, cold, decreased appetite or weight loss.    ECOG 0, worked in [employer] prior to his presentation, lives with his father, denies any family history of malignancy.  Former smoker, quit for many years, denies alcohol or recreational drug use.    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    Imaging   09/21/2022 MRI brain with and without contrast:  Findings consistent with recent craniotomy in the right frontal region for resection of a hemorrhagic mass with some persistent enhancement seen along with the mesial margin of the surgical bed suggesting some residual mass.  Fluid seen in the surgical bed with subdural hematoma seen on the right  side as well as with air seen on craniotomy site.  Midline shift from right to left, intraventricular hemorrhage seen in the right lateral ventricle.    Laboratory  01/15/2023:  Creatinine 1.20, albumin 4.6, calcium 10.0, alkaline phosphatase 58, total bili 2.2, AST 19, ALT 15, potassium 3.1, WBC count 3.69, hemoglobin 13.9, platelet 251, ANC 1.89.  12/14/2022:  Creatinine 1.53, albumin 4.1, calcium 9.6, LFTs within normal limits, WBC count 3.9, hemoglobin 14.4, MCV 87, platelet count 358, ANC 2.01  11/30/22: cr 1.43, alb 4.0, ca 10, alkphos 66, LFTs WNL, wbc 5.05, hgb 14.9, plt 302, ANC 2.91  11/15/2022:  Creatinine 1.05, albumin 4.3, calcium 10.5, alk-phos 59, total bili 1.1, AST 30, ALT 54, WBC 8.6, hemoglobin 15.2, MCV 88.7, platelet count 493, ANC 5.21.  ALC 2.29.    11/06/2022:  Creatinine 1.04, albumin 4.2, calcium 10.5, alkaline phosphatase 69, total bili 1.2, AST 26, ALT 54, WBC count 7.95, hemoglobin 15.1, MCV 87.6, platelet count 523, ANC 5.16      Past Medical History:   Diagnosis Date    Brain cancer        Past Surgical History:   Procedure Laterality Date    CRANIOTOMY FOR EXCISION OF INTRACRANIAL TUMOR      VENTRICULOSTOMY Left 9/20/2022    Procedure: VENTRICULOSTOMY;  Surgeon: Duglas Fowler MD;  Location: [facility];  Service: Neurosurgery;  Laterality: Left;       Family History   Problem Relation Age of Onset    Hypertension Father        Social History     Socioeconomic History    Marital status: Unknown   Tobacco Use    Smoking status: Never    Smokeless tobacco: Never   Substance and Sexual Activity    Alcohol use: Never    Drug use: Never       Current Outpatient Medications   Medication Sig Dispense Refill    levETIRAcetam (KEPPRA) 500 MG Tab Take 1 tablet (500 mg total) by mouth 2 (two) times daily. 60 tablet 11    ondansetron (ZOFRAN) 8 MG tablet Take 1 tablet (8 mg total) by mouth every 8 (eight) hours as needed for Nausea. 30 tablet 1    temozolomide (TEMODAR) 20 MG capsule Take 2 capsules  (40 mg total) by mouth once daily with 1 other temozolomide prescription for 290 mg total on days 1-5 every 28 days 10 capsule 6    temozolomide (TEMODAR) 250 MG capsule Take 1 capsule (250 mg total) by mouth once daily with 1 other temozolomide prescription for 290 mg total on days 1-5 every 28 days 5 capsule 6    HYDROcodone-acetaminophen (NORCO) 5-325 mg per tablet Take 1 tablet by mouth every 6 (six) hours as needed for Pain. (Patient not taking: Reported on 1/17/2023) 28 tablet 0     No current facility-administered medications for this visit.       Review of patient's allergies indicates:  No Known Allergies    Review of system  CONSTITUTIONAL: no fevers, no chills, no weight loss, no fatigue, no weakness  HEMATOLOGIC: no abnormal bleeding, no abnormal bruising, no drenching night sweats  ONCOLOGIC: no new masses or lumps  HEENT: no vision loss, no tinnitus or hearing loss, no nose bleeding, no dysphagia, no odynophagia  CVS: no chest pain, no palpitations, no dyspnea on exertion  RESP: no shortness of breath, no hemoptysis, no cough  GI: no nausea, no vomiting, no diarrhea, no constipation, no melena, no hematochezia, no hematemesis, no abdominal pain, no increase in abdominal girth  : no dysuria, no hematuria, no hesitancy, no scrotal swelling, no discharge  INTEGUMENT: no rashes, no abnormal bruising, no nail pitting, no hyperpigmentation  NEURO: no falls, no memory loss, no paresthesias or dysesthesias, no urofecal incontinence or retention, no loss of strength on any extremity  MSK: no back pain, no new joint pain, no joint swelling  PSYCH: no suicidal or homicidal ideation, no depression, no insomnia, no anhedonia  ENDOCRINE: no heat or cold intolerance, no polyuria, no polydipsia      Physical Exam:  Vitals:    01/17/23 0955   BP: (!) 135/91   Pulse: 67   Resp: 18   Temp: 98.7 °F (37.1 °C)               ECOG PS 0  GA: AAOx3, NAD  HEENT: NCAT, PERRLA, EOMI, good dentition, moist oral mucous membranes,  well-healed incision over the right frontal region  LYMPH: no cervical, axillary or supraclavicular adenopathy  CVS: s1s2 RRR, no M/R/G  RESP: CTA b/l, no crackles, no wheezes or rhonchi  ABD: soft, NT, ND, BS+, no hepatosplenomegaly  EXT: no deformities, no pedal edema  SKIN: no rashes, warm and dry  NEURO: normal mentation, strength 5/5 on all 4 extremities, no sensory deficits, cranial nerves 2-12 grossly intact      Assessment and plan    # Astrocytoma, WHO grade 4, IDH1 mutated,  ATRX mutated, TP53 mutated MGMT methylation present.   Status post gross total resection on 09/20/2022   Reviewed pathology report, noted grade 4 astrocytoma, IDH1 mutated, ATRX mutated and MGMT methylation present  Discussed with patient and his father the diagnosis, aggressive nature of disease, poor prognosis and treatment recommendations including adjuvant chemoradiation followed by chemotherapy with Temodar.    Discussed the option to explore clinical trials given patient's young age and good ECOG.  Prescription for Temodar 75 mg per m2 days 1-7 concurrently with radiation sent on his initial visit  Patient started concurrent chemo RT on 11/02/2022.  Noted updated pathology report with neuro are expanded panel and integrated diagnosis of astrocytoma, grade 4, IDH mutant.  Will plan for adjuvant temozolomide for 12 months following completion of concurrent chemo RT.         Plan   Reviewed labs, Will start patient on temozolomide 150 mg per m2 p.o. daily on days 1-5 Q 28 days today Prescription sent to pharmacy for authorization.  Start Bactrim double-strength tablet Monday Wednesday Friday  P.o. potassium replacement sent to pharmacy  follow-up in 4 weeks prior to cycle 2  Patient has an MRI scheduled Radiation Oncology next week.   With plan to refer patient to an academic center in the near future to discuss clinical trial enrollment in the event of recurrence.         Portions of the record may have been created with voice  recognition software. Occasional wrong-word or sound-a-like substitutions may have occurred due to the inherent limitations of voice recognition software. Read the chart carefully and recognize, using context, where substitutions have occurred.

## 2023-01-19 ENCOUNTER — TELEPHONE (OUTPATIENT)
Dept: HEMATOLOGY/ONCOLOGY | Facility: CLINIC | Age: 33
End: 2023-01-19
Payer: COMMERCIAL

## 2023-01-19 DIAGNOSIS — C71.9 GBM (GLIOBLASTOMA MULTIFORME): Primary | ICD-10-CM

## 2023-01-19 NOTE — TELEPHONE ENCOUNTER
Pt called with concerns of Temozolomide 250mg pt states since the dosage increase his eyes have been red. Pt wants to know if this is normal and if this is caused by the medication?   Call back# [phone]

## 2023-01-31 ENCOUNTER — TELEPHONE (OUTPATIENT)
Dept: HEMATOLOGY/ONCOLOGY | Facility: CLINIC | Age: 33
End: 2023-01-31
Payer: COMMERCIAL

## 2023-01-31 NOTE — TELEPHONE ENCOUNTER
Pt reports received summons for jury duty 3/6/23. Requesting medical excuse. Please advise.--SC, NICOLEN

## 2023-02-02 ENCOUNTER — TELEPHONE (OUTPATIENT)
Dept: NEUROSURGERY | Facility: CLINIC | Age: 33
End: 2023-02-02
Payer: COMMERCIAL

## 2023-02-03 NOTE — TELEPHONE ENCOUNTER
I spoke with the patients dad and explained that the screws are part of the plating system that holds the bone in place after the tumor removal.  They are typically made out of titanium and do not interfere with MRI.  He understood and was not concerned.  He was just curious because it was brought up when he went for the MRI and they did not recall any mention of screws after the surgery.  He asked if we had the recent MRI.  It is in [facility] [place].  He did not feel like they needed to come in for an appointment, but would like Dr. Fowler's opinion on the scan.  I will send a message to Dr. Fowler to review and call the patient with results and plan.

## 2023-02-09 ENCOUNTER — TELEPHONE (OUTPATIENT)
Dept: HEMATOLOGY/ONCOLOGY | Facility: CLINIC | Age: 33
End: 2023-02-09
Payer: COMMERCIAL

## 2023-02-09 NOTE — TELEPHONE ENCOUNTER
Pt reports received Temozolomide today so was unable to start on 2/1. When should Pt start medication? Also requesting results of MRI Brain. Please advise.--SC, LPN

## 2023-02-14 ENCOUNTER — OFFICE VISIT (OUTPATIENT)
Dept: HEMATOLOGY/ONCOLOGY | Facility: CLINIC | Age: 33
End: 2023-02-14
Payer: COMMERCIAL

## 2023-02-14 VITALS
SYSTOLIC BLOOD PRESSURE: 129 MMHG | DIASTOLIC BLOOD PRESSURE: 93 MMHG | HEART RATE: 63 BPM | RESPIRATION RATE: 18 BRPM | OXYGEN SATURATION: 100 % | HEIGHT: 72 IN | TEMPERATURE: 98 F | BODY MASS INDEX: 20.3 KG/M2 | WEIGHT: 149.88 LBS

## 2023-02-14 DIAGNOSIS — C71.9 GRADE IV ASTROCYTOMA: ICD-10-CM

## 2023-02-14 DIAGNOSIS — E53.8 FOLIC ACID DEFICIENCY: Primary | ICD-10-CM

## 2023-02-14 PROCEDURE — 3080F PR MOST RECENT DIASTOLIC BLOOD PRESSURE >= 90 MM HG: ICD-10-PCS | Mod: CPTII,,,

## 2023-02-14 PROCEDURE — 99214 PR OFFICE/OUTPT VISIT, EST, LEVL IV, 30-39 MIN: ICD-10-PCS | Mod: ,,,

## 2023-02-14 PROCEDURE — 3008F PR BODY MASS INDEX (BMI) DOCUMENTED: ICD-10-PCS | Mod: CPTII,,,

## 2023-02-14 PROCEDURE — 1159F PR MEDICATION LIST DOCUMENTED IN MEDICAL RECORD: ICD-10-PCS | Mod: CPTII,,,

## 2023-02-14 PROCEDURE — 1159F MED LIST DOCD IN RCRD: CPT | Mod: CPTII,,,

## 2023-02-14 PROCEDURE — 3074F SYST BP LT 130 MM HG: CPT | Mod: CPTII,,,

## 2023-02-14 PROCEDURE — 3080F DIAST BP >= 90 MM HG: CPT | Mod: CPTII,,,

## 2023-02-14 PROCEDURE — 3008F BODY MASS INDEX DOCD: CPT | Mod: CPTII,,,

## 2023-02-14 PROCEDURE — 3074F PR MOST RECENT SYSTOLIC BLOOD PRESSURE < 130 MM HG: ICD-10-PCS | Mod: CPTII,,,

## 2023-02-14 PROCEDURE — 99214 OFFICE O/P EST MOD 30 MIN: CPT | Mod: ,,,

## 2023-02-14 RX ORDER — TEMOZOLOMIDE 20 MG/1
20 CAPSULE ORAL DAILY
COMMUNITY
Start: 2023-02-06 | End: 2023-07-19

## 2023-02-14 RX ORDER — FOLIC ACID 1 MG/1
1 TABLET ORAL DAILY
Qty: 30 TABLET | Refills: 4 | Status: SHIPPED | OUTPATIENT
Start: 2023-02-14 | End: 2023-07-31 | Stop reason: SDUPTHER

## 2023-02-14 RX ORDER — TEMOZOLOMIDE 250 MG/1
250 CAPSULE ORAL DAILY
COMMUNITY
Start: 2023-02-06 | End: 2023-07-19

## 2023-02-14 NOTE — PROGRESS NOTES
Referring provider: Dr. Sauceda  Reason for consult:  High-grade glioma     Treatment history   11/02/2022-12/15/2022:  Concurrent chemo RT with temozolomide 75 mg per m2 days daily  1/17/22- current: Temozolomide 150 mg/m2 day 1-5 q 28 days    Patient is a 32-year-old with no past medical history who presented to the ER at [facility] [place] on 09/20/2022 with symptoms of nausea, vomiting and headaches.  He had a CT of his head done in the ER which showed a right frontal lobe mass concerning for malignancy.  He was transferred to [facility] for neurosurgical consultation.  He underwent a right frontoparietal craniotomy with near total resection of the frontal lobe mass, pathology from which revealed high-grade glioma pending further classification.  Patient was discharged from hospital soon after his resection without any complications.    He presented to clinic on 10/05/2022 to establish care to discuss further disease management.      Today, 02/14/23, patient denies any acute concerns since last follow-up visit with us. He reports mild nausea with temozolomide but uses ondansetron 30 minutes prior for relief.  He denies any fevers, chills, headaches, vision changes or focal weakness.  He denies any shortness of breath, cough, cold, decreased appetite or weight loss.    ECOG 0, worked in [employer] prior to his presentation, lives with his father, denies any family history of malignancy.  Former smoker, quit for many years, denies alcohol or recreational drug use.    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    Imaging   02/02/23 MRI Brain w/wo contrast: Findings consistent with large surgical resection defect in the anterior right frontal lobe. Interval evolution of the hemorrhage or proteinaceous debris within the resection cavity. Small focus of nodular enhancement at the dorsal surface of the resection.    09/21/2022 MRI brain with  and without contrast:  Findings consistent with recent craniotomy in the right frontal region for resection of a hemorrhagic mass with some persistent enhancement seen along with the mesial margin of the surgical bed suggesting some residual mass.  Fluid seen in the surgical bed with subdural hematoma seen on the right side as well as with air seen on craniotomy site.  Midline shift from right to left, intraventricular hemorrhage seen in the right lateral ventricle.    Laboratory  0213/23: Cr 1.30, alb 4.3, ca 9.9, LFTs WNL, TB 1.3, mag 1.9, K+ 3.5, phosphorus 3.7, folic acid 4.89, B12 247, wbc 3.23, hgb 11.9, plt 129, ANC 2.10  01/15/2023:  Creatinine 1.20, albumin 4.6, calcium 10.0, alkaline phosphatase 58, total bili 2.2, AST 19, ALT 15, potassium 3.1, WBC count 3.69, hemoglobin 13.9, platelet 251, ANC 1.89.  12/14/2022:  Creatinine 1.53, albumin 4.1, calcium 9.6, LFTs within normal limits, WBC count 3.9, hemoglobin 14.4, MCV 87, platelet count 358, ANC 2.01  11/30/22: cr 1.43, alb 4.0, ca 10, alkphos 66, LFTs WNL, wbc 5.05, hgb 14.9, plt 302, ANC 2.91  11/15/2022:  Creatinine 1.05, albumin 4.3, calcium 10.5, alk-phos 59, total bili 1.1, AST 30, ALT 54, WBC 8.6, hemoglobin 15.2, MCV 88.7, platelet count 493, ANC 5.21.  ALC 2.29.    11/06/2022:  Creatinine 1.04, albumin 4.2, calcium 10.5, alkaline phosphatase 69, total bili 1.2, AST 26, ALT 54, WBC count 7.95, hemoglobin 15.1, MCV 87.6, platelet count 523, ANC 5.16      Past Medical History:   Diagnosis Date    Brain cancer        Past Surgical History:   Procedure Laterality Date    CRANIOTOMY FOR EXCISION OF INTRACRANIAL TUMOR      VENTRICULOSTOMY Left 9/20/2022    Procedure: VENTRICULOSTOMY;  Surgeon: Duglas Fowler MD;  Location: [facility] OR;  Service: Neurosurgery;  Laterality: Left;       Family History   Problem Relation Age of Onset    Hypertension Father        Social History     Socioeconomic History    Marital status: Unknown   Tobacco Use    Smoking  status: Never    Smokeless tobacco: Never   Substance and Sexual Activity    Alcohol use: Never    Drug use: Never       Current Outpatient Medications   Medication Sig Dispense Refill    levETIRAcetam (KEPPRA) 500 MG Tab Take 1 tablet (500 mg total) by mouth 2 (two) times daily. 60 tablet 11    ondansetron (ZOFRAN) 8 MG tablet Take 1 tablet (8 mg total) by mouth every 8 (eight) hours as needed for Nausea. 30 tablet 1    sulfamethoxazole-trimethoprim 800-160mg (BACTRIM DS) 800-160 mg Tab Take 1 tablet by mouth every Mon, Wed, Fri. 20 tablet 0    temozolomide (TEMODAR) 20 MG capsule Take 20 mg by mouth Daily.      temozolomide (TEMODAR) 250 MG capsule Take 250 mg by mouth Daily.      carboxymethylcellulose sodium 0.25 % Drop Place 2 drops into both eyes 4 (four) times daily as needed (red/watery eyes). (Patient not taking: Reported on 2/14/2023) 15 mL 0    HYDROcodone-acetaminophen (NORCO) 5-325 mg per tablet Take 1 tablet by mouth every 6 (six) hours as needed for Pain. (Patient not taking: Reported on 1/17/2023) 28 tablet 0     No current facility-administered medications for this visit.       Review of patient's allergies indicates:  No Known Allergies    Review of system  CONSTITUTIONAL: no fevers, no chills, no weight loss, no fatigue, no weakness  HEMATOLOGIC: no abnormal bleeding, no abnormal bruising, no drenching night sweats  ONCOLOGIC: no new masses or lumps  HEENT: no vision loss, no tinnitus or hearing loss, no nose bleeding, no dysphagia, no odynophagia  CVS: no chest pain, no palpitations, no dyspnea on exertion  RESP: no shortness of breath, no hemoptysis, no cough  GI: no nausea, no vomiting, no diarrhea, no constipation, no melena, no hematochezia, no hematemesis, no abdominal pain, no increase in abdominal girth  : no dysuria, no hematuria, no hesitancy, no scrotal swelling, no discharge  INTEGUMENT: no rashes, no abnormal bruising, no nail pitting, no hyperpigmentation  NEURO: no falls, no  memory loss, no paresthesias or dysesthesias, no urofecal incontinence or retention, no loss of strength on any extremity  MSK: no back pain, no new joint pain, no joint swelling  PSYCH: no suicidal or homicidal ideation, no depression, no insomnia, no anhedonia  ENDOCRINE: no heat or cold intolerance, no polyuria, no polydipsia      Physical Exam:  Vitals:    02/14/23 1400   BP: (!) 129/93   Pulse: 63   Resp: 18   Temp: 98.3 °F (36.8 °C)               ECOG PS 0  GA: AAOx3, NAD  HEENT: NCAT, PERRLA, EOMI, good dentition, moist oral mucous membranes, well-healed incision over the right frontal region  LYMPH: no cervical, axillary or supraclavicular adenopathy  CVS: s1s2 RRR, no M/R/G  RESP: CTA b/l, no crackles, no wheezes or rhonchi  ABD: soft, NT, ND, BS+, no hepatosplenomegaly  EXT: no deformities, no pedal edema  SKIN: no rashes, warm and dry  NEURO: normal mentation, strength 5/5 on all 4 extremities, no sensory deficits, cranial nerves 2-12 grossly intact      Assessment and plan    # Astrocytoma, WHO grade 4, IDH1 mutated,  ATRX mutated, TP53 mutated MGMT methylation present.   Status post gross total resection on 09/20/2022   Reviewed pathology report, noted grade 4 astrocytoma, IDH1 mutated, ATRX mutated and MGMT methylation present  Discussed with patient and his father the diagnosis, aggressive nature of disease, poor prognosis and treatment recommendations including adjuvant chemoradiation followed by chemotherapy with Temodar.    Discussed the option to explore clinical trials given patient's young age and good ECOG.  Prescription for Temodar 75 mg per m2 days 1-7 concurrently with radiation sent on his initial visit  Patient started concurrent chemo RT on 11/02/2022.  Noted updated pathology report with neuro are expanded panel and integrated diagnosis of astrocytoma, grade 4, IDH mutant.  Will plan for adjuvant temozolomide for 12 months following completion of concurrent chemo RT.         Plan    Reviewed labs, Will proceed with cycle 2 temozolomide 150 mg per m2 p.o. daily on days 1-5 Q 28 days tomorrow   Start Bactrim double-strength tablet Monday Wednesday Friday  C/w oral potassium   follow-up in 4 weeks prior to cycle 3  Patient will repeat MRI w/ OV to follow next month 3/23/23 Dr Anthony  With plan to refer patient to an academic center in the near future to discuss clinical trial enrollment in the event of recurrence.           BRENNAN FlorezC

## 2023-02-16 NOTE — CONSULTS
"[facility]  Hospital Consultation    Reason for Consultation: Hemorrhagic brain mass   Consulted by: Graham Mendez MD  Date of Consultation: 9/20/2022     SUBJECTIVE:     Chief Complaint: Brain mass    History of Present Illness:   Patient is a 32-year-old male transfer from [facility] for ICH with possible mass. Patient initially presented to transferring facility with c/o headache that began a few months prior with some nausea.  He reported pain that was constant, located behind the right eye.  Denies a history of migraines.  He does not take any regular medications.  His father arrived shortly after the patient states that he has not told him about any headaches prior to last night.  He says his son has no known medical problems and takes no medications. CT head completed on arrival indicated a large right frontal lobe parenchymal hemorrhage with surrounding edema and suspected underlying mass; mass effect with 15 mm of leftward midline shift and hydrocephalus. Dr Fowler was consulted for neurosurgical evaluation.     Patient Active Problem List    Diagnosis Date Noted    Brain mass 09/20/2022     History reviewed. No pertinent past medical history.  History reviewed. No pertinent surgical history.  No medications prior to admission.     Review of patient's allergies indicates:  No Known Allergies  Social History     Tobacco Use    Smoking status: Not on file    Smokeless tobacco: Not on file   Substance Use Topics    Alcohol use: Not on file     Family History   Family history unknown: Yes       Vital Signs  Temp: 99 °F (37.2 °C)  Temp src: Oral  Pulse: (!) 58  Heart Rate Source: Monitor  Resp: 18  SpO2: 99 %  Pulse Oximetry Type: Continuous  Oxygen Concentration (%): 30  O2 Device (Oxygen Therapy): room air  BP: 122/62  BP Location: Right arm  BP Method: Automatic  Patient Position: Lying  Arousal Level: arouses to voice  Height and Weight  Height: 5' 11" (180.3 " cm)  Height Method: Measured  Weight: 63.2 kg (139 lb 5.3 oz)  Weight Method: Standard Scale  BSA (Calculated - sq m): 1.78 sq meters  BMI (Calculated): 19.4  Weight in (lb) to have BMI = 25: 178.9]    ROS:  Review of Systems   Neurological:  Positive for seizures and headaches.     OBJECTIVE:     Vital signs in last 24 hours:  Temp:  [98.6 °F (37 °C)] 98.6 °F (37 °C)  Pulse:  [49-89] 49  Resp:  [13-18] 14  SpO2:  [96 %-99 %] 99 %  BP: (128-162)/(68-93) 128/86    General:  mild distress, cooperative     HENT:  Normocephalic, without obvious abnormality, atraumatic  No CSF otorrhea or rhinorrhea    Eyes:  Normal conjunctivae; PERRL     Neck:  ROM full and painless. No point tenderness or crepitus.     CV:  regular rate and rhythm    Lungs:   Clear to ascultation bilaterally. Non-labored respirations    Abdomen:  Soft, non-tender, non-distended    Musculoskeletal:  No swelling/edema    Skin:  Warm, dry    Neurological:  Person, Place, Time   Decerebrate posturing  Speech is clear   Pupils are equal, round, and reactive to light; right pupil is sluggish  Extraocular movements are intact except he is unable to gaze to the right and does have a nystagmus with a fast which to the left when attempting a rightward gaze   Gait not assessed at this time    Data Review:   Relevant labs reviewed   CT head stealth WO contrast revealed stable large right frontal lobe parenchymal hemorrhage with surrounding edema and suspected underlying mass; Mass effect with 15 mm of leftward midline shift and hydrocephalus.     ASSESSMENT/PLAN:     Assessment:   Hemorrhagic brain mass    PLAN:  CT head reviewed and discussed with patient and family member and discussed that he will need surgical intervention   MRI of brain W WO contrast ordered for further evaluation  Final plans to be determined by Dr. Fowler when additional imaging available     I explained the condition and potential surgical options to the patient and answered any  questions.           Finasteride Pregnancy And Lactation Text: This medication is absolutely contraindicated during pregnancy. It is unknown if it is excreted in breast milk.

## 2023-02-20 ENCOUNTER — TELEPHONE (OUTPATIENT)
Dept: NEUROSURGERY | Facility: CLINIC | Age: 33
End: 2023-02-20
Payer: COMMERCIAL

## 2023-02-20 NOTE — TELEPHONE ENCOUNTER
I sent a message to Dr. Fowler regarding image review.  I will call patient once I get a response.

## 2023-03-14 NOTE — TELEPHONE ENCOUNTER
Dr. Fowler reviewed the patient's MRI.  He said the scan looked good and that he would see him after his next MRI.  I spoke with the patient last week and relayed this information.  His next MRI is scheduled on 3/20/23.  Dr. Fowler is out of the office that week, so the patient would need to be seen 3/27 or 3/29.  He has to work at 3pm, so he needs something earlier in the day.  The 27th is currently overbooked and 3/29 is full.  I will see if there is anything that can be moved around to accommodate him.  Just giving a heads up in case someone falls off one of those days.

## 2023-03-14 NOTE — TELEPHONE ENCOUNTER
I don't have anything either unless we put him in the lunch hour on the 29th then work it as it comes up?

## 2023-03-15 ENCOUNTER — OFFICE VISIT (OUTPATIENT)
Dept: HEMATOLOGY/ONCOLOGY | Facility: CLINIC | Age: 33
End: 2023-03-15
Payer: COMMERCIAL

## 2023-03-15 VITALS
HEART RATE: 74 BPM | HEIGHT: 72 IN | WEIGHT: 149.13 LBS | OXYGEN SATURATION: 99 % | TEMPERATURE: 98 F | SYSTOLIC BLOOD PRESSURE: 139 MMHG | RESPIRATION RATE: 18 BRPM | DIASTOLIC BLOOD PRESSURE: 79 MMHG | BODY MASS INDEX: 20.2 KG/M2

## 2023-03-15 DIAGNOSIS — C71.9 GBM (GLIOBLASTOMA MULTIFORME): ICD-10-CM

## 2023-03-15 DIAGNOSIS — C71.9 GRADE IV ASTROCYTOMA: Primary | ICD-10-CM

## 2023-03-15 DIAGNOSIS — E87.6 HYPOKALEMIA: ICD-10-CM

## 2023-03-15 PROCEDURE — 1159F PR MEDICATION LIST DOCUMENTED IN MEDICAL RECORD: ICD-10-PCS | Mod: CPTII,,,

## 2023-03-15 PROCEDURE — 99215 PR OFFICE/OUTPT VISIT, EST, LEVL V, 40-54 MIN: ICD-10-PCS | Mod: ,,,

## 2023-03-15 PROCEDURE — 99215 OFFICE O/P EST HI 40 MIN: CPT | Mod: ,,,

## 2023-03-15 PROCEDURE — 3078F DIAST BP <80 MM HG: CPT | Mod: CPTII,,,

## 2023-03-15 PROCEDURE — 3078F PR MOST RECENT DIASTOLIC BLOOD PRESSURE < 80 MM HG: ICD-10-PCS | Mod: CPTII,,,

## 2023-03-15 PROCEDURE — 3075F SYST BP GE 130 - 139MM HG: CPT | Mod: CPTII,,,

## 2023-03-15 PROCEDURE — 3075F PR MOST RECENT SYSTOLIC BLOOD PRESS GE 130-139MM HG: ICD-10-PCS | Mod: CPTII,,,

## 2023-03-15 PROCEDURE — 3008F BODY MASS INDEX DOCD: CPT | Mod: CPTII,,,

## 2023-03-15 PROCEDURE — 3008F PR BODY MASS INDEX (BMI) DOCUMENTED: ICD-10-PCS | Mod: CPTII,,,

## 2023-03-15 PROCEDURE — 1159F MED LIST DOCD IN RCRD: CPT | Mod: CPTII,,,

## 2023-03-15 RX ORDER — POTASSIUM CHLORIDE 20 MEQ/1
20 TABLET, EXTENDED RELEASE ORAL DAILY
Qty: 30 TABLET | Refills: 3 | Status: SHIPPED | OUTPATIENT
Start: 2023-03-15 | End: 2023-12-29

## 2023-03-15 NOTE — PROGRESS NOTES
Referring provider: Dr. Sauceda  Reason for consult:  High-grade glioma     Treatment history   11/02/2022-12/15/2022:  Concurrent chemo RT with temozolomide 75 mg per m2 days daily  1/17/22- current: Temozolomide 150 mg/m2 day 1-5 q 28 days    Patient is a 32-year-old with no past medical history who presented to the ER at [facility] [place] on 09/20/2022 with symptoms of nausea, vomiting and headaches.  He had a CT of his head done in the ER which showed a right frontal lobe mass concerning for malignancy.  He was transferred to [facility] for neurosurgical consultation.  He underwent a right frontoparietal craniotomy with near total resection of the frontal lobe mass, pathology from which revealed high-grade glioma pending further classification.  Patient was discharged from hospital soon after his resection without any complications.    He presented to clinic on 10/05/2022 to establish care to discuss further disease management.      Today, 03/15/23, patient denies any acute concerns since last follow-up visit with us. He reports mild nausea with temozolomide but uses ondansetron 30 minutes prior for relief.  He denies any fevers, chills, headaches, vision changes or focal weakness.  He denies any shortness of breath, cough, cold, decreased appetite or weight loss.    ECOG 0, worked in [employer] prior to his presentation, lives with his father, denies any family history of malignancy.  Former smoker, quit for many years, denies alcohol or recreational drug use.    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    Imaging   02/02/23 MRI Brain w/wo contrast: Findings consistent with large surgical resection defect in the anterior right frontal lobe. Interval evolution of the hemorrhage or proteinaceous debris within the resection cavity. Small focus of nodular enhancement at the dorsal surface of the resection.    09/21/2022 MRI brain with  and without contrast:  Findings consistent with recent craniotomy in the right frontal region for resection of a hemorrhagic mass with some persistent enhancement seen along with the mesial margin of the surgical bed suggesting some residual mass.  Fluid seen in the surgical bed with subdural hematoma seen on the right side as well as with air seen on craniotomy site.  Midline shift from right to left, intraventricular hemorrhage seen in the right lateral ventricle.    Laboratory  0213/23: Cr 1.30, alb 4.3, ca 9.9, LFTs WNL, TB 1.3, mag 1.9, K+ 3.5, phosphorus 3.7, folic acid 4.89, B12 247, wbc 3.23, hgb 11.9, plt 129, ANC 2.10  01/15/2023:  Creatinine 1.20, albumin 4.6, calcium 10.0, alkaline phosphatase 58, total bili 2.2, AST 19, ALT 15, potassium 3.1, WBC count 3.69, hemoglobin 13.9, platelet 251, ANC 1.89.  12/14/2022:  Creatinine 1.53, albumin 4.1, calcium 9.6, LFTs within normal limits, WBC count 3.9, hemoglobin 14.4, MCV 87, platelet count 358, ANC 2.01  11/30/22: cr 1.43, alb 4.0, ca 10, alkphos 66, LFTs WNL, wbc 5.05, hgb 14.9, plt 302, ANC 2.91  11/15/2022:  Creatinine 1.05, albumin 4.3, calcium 10.5, alk-phos 59, total bili 1.1, AST 30, ALT 54, WBC 8.6, hemoglobin 15.2, MCV 88.7, platelet count 493, ANC 5.21.  ALC 2.29.    11/06/2022:  Creatinine 1.04, albumin 4.2, calcium 10.5, alkaline phosphatase 69, total bili 1.2, AST 26, ALT 54, WBC count 7.95, hemoglobin 15.1, MCV 87.6, platelet count 523, ANC 5.16      Past Medical History:   Diagnosis Date    Brain cancer        Past Surgical History:   Procedure Laterality Date    CRANIOTOMY FOR EXCISION OF INTRACRANIAL TUMOR      VENTRICULOSTOMY Left 9/20/2022    Procedure: VENTRICULOSTOMY;  Surgeon: Duglas Fowler MD;  Location: [facility] OR;  Service: Neurosurgery;  Laterality: Left;       Family History   Problem Relation Age of Onset    Hypertension Father        Social History     Socioeconomic History    Marital status: Unknown   Tobacco Use    Smoking  status: Never    Smokeless tobacco: Never   Substance and Sexual Activity    Alcohol use: Never    Drug use: Never       Current Outpatient Medications   Medication Sig Dispense Refill    folic acid (FOLVITE) 1 MG tablet Take 1 tablet (1 mg total) by mouth once daily. 30 tablet 4    levETIRAcetam (KEPPRA) 500 MG Tab Take 1 tablet (500 mg total) by mouth 2 (two) times daily. 60 tablet 11    ondansetron (ZOFRAN) 8 MG tablet Take 1 tablet (8 mg total) by mouth every 8 (eight) hours as needed for Nausea. 30 tablet 1    temozolomide (TEMODAR) 20 MG capsule Take 20 mg by mouth Daily.      temozolomide (TEMODAR) 250 MG capsule Take 250 mg by mouth Daily.      carboxymethylcellulose sodium 0.25 % Drop Place 2 drops into both eyes 4 (four) times daily as needed (red/watery eyes). (Patient not taking: Reported on 2/14/2023) 15 mL 0    HYDROcodone-acetaminophen (NORCO) 5-325 mg per tablet Take 1 tablet by mouth every 6 (six) hours as needed for Pain. (Patient not taking: Reported on 1/17/2023) 28 tablet 0    sulfamethoxazole-trimethoprim 800-160mg (BACTRIM DS) 800-160 mg Tab Take 1 tablet by mouth every Mon, Wed, Fri. (Patient not taking: Reported on 3/15/2023) 20 tablet 0     No current facility-administered medications for this visit.       Review of patient's allergies indicates:  No Known Allergies    Review of system  CONSTITUTIONAL: no fevers, no chills, no weight loss, no fatigue, no weakness  HEMATOLOGIC: no abnormal bleeding, no abnormal bruising, no drenching night sweats  ONCOLOGIC: no new masses or lumps  HEENT: no vision loss, no tinnitus or hearing loss, no nose bleeding, no dysphagia, no odynophagia  CVS: no chest pain, no palpitations, no dyspnea on exertion  RESP: no shortness of breath, no hemoptysis, no cough  GI: no nausea, no vomiting, no diarrhea, no constipation, no melena, no hematochezia, no hematemesis, no abdominal pain, no increase in abdominal girth  : no dysuria, no hematuria, no hesitancy, no  scrotal swelling, no discharge  INTEGUMENT: no rashes, no abnormal bruising, no nail pitting, no hyperpigmentation  NEURO: no falls, no memory loss, no paresthesias or dysesthesias, no urofecal incontinence or retention, no loss of strength on any extremity  MSK: no back pain, no new joint pain, no joint swelling  PSYCH: no suicidal or homicidal ideation, no depression, no insomnia, no anhedonia  ENDOCRINE: no heat or cold intolerance, no polyuria, no polydipsia      Physical Exam:  Vitals:    03/15/23 1346   BP: 139/79   Pulse: 74   Resp: 18   Temp: 98 °F (36.7 °C)               ECOG PS 0  GA: AAOx3, NAD  HEENT: NCAT, PERRLA, EOMI, good dentition, moist oral mucous membranes, well-healed incision over the right frontal region  LYMPH: no cervical, axillary or supraclavicular adenopathy  CVS: s1s2 RRR, no M/R/G  RESP: CTA b/l, no crackles, no wheezes or rhonchi  ABD: soft, NT, ND, BS+, no hepatosplenomegaly  EXT: no deformities, no pedal edema  SKIN: no rashes, warm and dry  NEURO: normal mentation, strength 5/5 on all 4 extremities, no sensory deficits, cranial nerves 2-12 grossly intact      Assessment and plan    # Astrocytoma, WHO grade 4, IDH1 mutated,  ATRX mutated, TP53 mutated MGMT methylation present.   Status post gross total resection on 09/20/2022   Reviewed pathology report, noted grade 4 astrocytoma, IDH1 mutated, ATRX mutated and MGMT methylation present  Discussed with patient and his father the diagnosis, aggressive nature of disease, poor prognosis and treatment recommendations including adjuvant chemoradiation followed by chemotherapy with Temodar.    Discussed the option to explore clinical trials given patient's young age and good ECOG.  Prescription for Temodar 75 mg per m2 days 1-7 concurrently with radiation sent on his initial visit  Patient started concurrent chemo RT on 11/02/2022.  Noted updated pathology report with neuro are expanded panel and integrated diagnosis of astrocytoma, grade  4, IDH mutant.  Will plan for adjuvant temozolomide for 12 months following completion of concurrent chemo RT.         Plan   Reviewed labs, Will proceed with cycle 3 temozolomide 150 mg per m2 p.o. daily on days 1-5 Q 28 days  Start Bactrim double-strength tablet Monday Wednesday Friday  C/w oral potassium. Refill sent today. K+ 3.3  follow-up in 4 weeks prior to cycle 4  Patient will repeat MRI w/ OV to follow next month 3/23/23 Dr Anthony  With plan to refer patient to an academic center in the near future to discuss clinical trial enrollment in the event of recurrence.           Liz Victoria, JANE-C

## 2023-03-16 DIAGNOSIS — C71.9 HIGH GRADE GLIOMA NOT CLASSIFIABLE BY WHO CRITERIA: ICD-10-CM

## 2023-03-16 RX ORDER — SULFAMETHOXAZOLE AND TRIMETHOPRIM 800; 160 MG/1; MG/1
1 TABLET ORAL
Qty: 20 TABLET | Refills: 0 | Status: SHIPPED | OUTPATIENT
Start: 2023-03-17 | End: 2023-09-25

## 2023-03-20 ENCOUNTER — TELEPHONE (OUTPATIENT)
Dept: NEUROSURGERY | Facility: CLINIC | Age: 33
End: 2023-03-20
Payer: COMMERCIAL

## 2023-03-24 NOTE — TELEPHONE ENCOUNTER
I spoke with the patient's dad.  He was wondering if Dr. Fowler needed to see the patient, as they have not followed up here since surgery.  I let him know that Dr. Fowler had reviewed the MRI from early February and that he thought it looked fine.  He said he would see the patient after his next MRI, which was done on 3/20.  We are working on getting him in next week for follow up.  I told Mr. Anthony that I had spoken with his son about this as well and they should be getting a phone call early next week about an appt.

## 2023-03-24 NOTE — TELEPHONE ENCOUNTER
Spoke with patient's father and scheduled patient for Mon 03/27 @ 11:30. He was compliant of this appt date and time.

## 2023-03-24 NOTE — TELEPHONE ENCOUNTER
Still need to find a spot for him next week if possible.  He would like to see Dr. Fowler to discuss MRI from 3/20.

## 2023-03-27 ENCOUNTER — OFFICE VISIT (OUTPATIENT)
Dept: NEUROSURGERY | Facility: CLINIC | Age: 33
End: 2023-03-27
Payer: COMMERCIAL

## 2023-03-27 VITALS
BODY MASS INDEX: 20.05 KG/M2 | RESPIRATION RATE: 16 BRPM | HEART RATE: 56 BPM | SYSTOLIC BLOOD PRESSURE: 135 MMHG | DIASTOLIC BLOOD PRESSURE: 88 MMHG | WEIGHT: 148 LBS | HEIGHT: 72 IN

## 2023-03-27 DIAGNOSIS — C71.9 GBM (GLIOBLASTOMA MULTIFORME): Primary | ICD-10-CM

## 2023-03-27 PROCEDURE — 3008F BODY MASS INDEX DOCD: CPT | Mod: CPTII,,, | Performed by: NEUROLOGICAL SURGERY

## 2023-03-27 PROCEDURE — 3075F SYST BP GE 130 - 139MM HG: CPT | Mod: CPTII,,, | Performed by: NEUROLOGICAL SURGERY

## 2023-03-27 PROCEDURE — 1159F MED LIST DOCD IN RCRD: CPT | Mod: CPTII,,, | Performed by: NEUROLOGICAL SURGERY

## 2023-03-27 PROCEDURE — 99213 OFFICE O/P EST LOW 20 MIN: CPT | Mod: ,,, | Performed by: NEUROLOGICAL SURGERY

## 2023-03-27 PROCEDURE — 3008F PR BODY MASS INDEX (BMI) DOCUMENTED: ICD-10-PCS | Mod: CPTII,,, | Performed by: NEUROLOGICAL SURGERY

## 2023-03-27 PROCEDURE — 3075F PR MOST RECENT SYSTOLIC BLOOD PRESS GE 130-139MM HG: ICD-10-PCS | Mod: CPTII,,, | Performed by: NEUROLOGICAL SURGERY

## 2023-03-27 PROCEDURE — 3079F PR MOST RECENT DIASTOLIC BLOOD PRESSURE 80-89 MM HG: ICD-10-PCS | Mod: CPTII,,, | Performed by: NEUROLOGICAL SURGERY

## 2023-03-27 PROCEDURE — 3079F DIAST BP 80-89 MM HG: CPT | Mod: CPTII,,, | Performed by: NEUROLOGICAL SURGERY

## 2023-03-27 PROCEDURE — 99213 PR OFFICE/OUTPT VISIT, EST, LEVL III, 20-29 MIN: ICD-10-PCS | Mod: ,,, | Performed by: NEUROLOGICAL SURGERY

## 2023-03-27 PROCEDURE — 1159F PR MEDICATION LIST DOCUMENTED IN MEDICAL RECORD: ICD-10-PCS | Mod: CPTII,,, | Performed by: NEUROLOGICAL SURGERY

## 2023-03-27 PROCEDURE — 1160F RVW MEDS BY RX/DR IN RCRD: CPT | Mod: CPTII,,, | Performed by: NEUROLOGICAL SURGERY

## 2023-03-27 PROCEDURE — 1160F PR REVIEW ALL MEDS BY PRESCRIBER/CLIN PHARMACIST DOCUMENTED: ICD-10-PCS | Mod: CPTII,,, | Performed by: NEUROLOGICAL SURGERY

## 2023-03-27 NOTE — PROGRESS NOTES
[facility]  Neurosurgery  Established Patient      Michael Anthony   [id number]   1990       SUBJECTIVE:     History of Present Illness:  Patient is a 33 y.o. male who presents for follow up for a brain mass.  He is s/p right frontoparietal craniotomy for resection of hemorrhagic tumor that was done on 9/20/22.  Pathology was consistent with glioblastoma.  He was treated with radiation and Temodar from 11/2/22 to 12/15/22.  He is under the care of Dr. Renee with oncology.  He continues treatment with Temodar.  Plan is to remain on this for one year.  He is under the care of Dr. Anthony with radiation oncology.  They have discussed treatment with Optune, but the patient decided against this.  He followed up with Dr. Anthony last week and he is planning another MRI in 2 months.  The patient is doing well today and is tolerating treatments well.  He has been able to return to work full time.  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  He has not had any seizure activity before or after the surgery.  He remains on Keppra, but the dosage was decreased to 500mg once a day.  He is no longer on steroids.        Past Medical History:   Diagnosis Date    Brain cancer       Past Surgical History:   Procedure Laterality Date    CRANIOTOMY FOR EXCISION OF INTRACRANIAL TUMOR      VENTRICULOSTOMY Left 9/20/2022    Procedure: VENTRICULOSTOMY;  Surgeon: Duglas Fowler MD;  Location: [facility];  Service: Neurosurgery;  Laterality: Left;      Outpatient Encounter Medications as of 3/27/2023   Medication Sig Dispense Refill    folic acid (FOLVITE) 1 MG tablet Take 1 tablet (1 mg total) by mouth once daily. 30 tablet 4    levETIRAcetam (KEPPRA) 500 MG Tab Take 1 tablet (500 mg total) by mouth 2 (two) times daily. 60 tablet 11    ondansetron (ZOFRAN) 8 MG tablet Take 1 tablet (8 mg total) by mouth every 8 (eight) hours as needed for Nausea. 30 tablet 1    potassium chloride SA (K-DUR,KLOR-CON) 20 MEQ tablet Take 1 tablet  (20 mEq total) by mouth once daily. 30 tablet 3    sulfamethoxazole-trimethoprim 800-160mg (BACTRIM DS) 800-160 mg Tab Take 1 tablet by mouth every Mon, Wed, Fri. 20 tablet 0    temozolomide (TEMODAR) 20 MG capsule Take 20 mg by mouth Daily.      temozolomide (TEMODAR) 250 MG capsule Take 250 mg by mouth Daily.      carboxymethylcellulose sodium 0.25 % Drop Place 2 drops into both eyes 4 (four) times daily as needed (red/watery eyes). (Patient not taking: Reported on 2/14/2023) 15 mL 0    HYDROcodone-acetaminophen (NORCO) 5-325 mg per tablet Take 1 tablet by mouth every 6 (six) hours as needed for Pain. (Patient not taking: Reported on 1/17/2023) 28 tablet 0     No facility-administered encounter medications on file as of 3/27/2023.      Review of patient's allergies indicates:  No Known Allergies   Social History     Tobacco Use    Smoking status: Never    Smokeless tobacco: Never   Substance Use Topics    Alcohol use: Never      Family History   Problem Relation Age of Onset    Hypertension Father        Review of Systems:    Pertinent items are noted in HPI.      OBJECTIVE:     Vital Signs  Pulse: (!) 56  Resp: 16  BP: 135/88  Pain Score: 0-No pain  Height: 6' (182.9 cm)  Weight: 67.1 kg (148 lb)  Body mass index is 20.07 kg/m².    General:  healthy, alert, no distress, cooperative    Head:  Normocephalic, without obvious abnormality, atraumatic    Lungs:   Breathing is quiet, non-lablored    Neurological:    Oriented to Person, Place, Time   Speech:  normal  Memory, cognition, and affect are appropriate.  Extraocular movements are intact.  Movements of facial expression are intact and symmetric.  Motor Strength: Moves all extremities spontaneously with good tone.  No abnormal movements seen.    Gait is normal    His 2  follow-up MRIs, 1 about 6 weeks ago as well as the more recent one from last week look remarkably good.  There is no abnormal enhancement.  The small nodular areas of concern seen on the last MRI  seemed to have improved.  His mental status is back to normal and he is back at work full time.  He is cleared to drive my standpoint.  I will let Dr. Anthony manage his serial imaging, but I would like to see him 1 year postop with his MRI.    ASSESSMENT/PLAN:     1. GBM (glioblastoma multiforme)    - Patient instructed to message through portal when MRI's are done and ready for review.   - Follow up 1 year post op w/ MRI ordered by Dr. Anthony        I, Dr. Duglas Fowler, personally performed the services described in this documentation. All medical record entries made by the scribe, Shante Davis RN, were at my direction and in my presence.  I have reviewed the chart and agree that the record reflects my personal performance and is accurate and complete. Duglas Fowler MD.  12:07 PM 03/27/2023       Duglas Fowler MD FACS [facility]

## 2023-04-12 ENCOUNTER — OFFICE VISIT (OUTPATIENT)
Dept: HEMATOLOGY/ONCOLOGY | Facility: CLINIC | Age: 33
End: 2023-04-12
Payer: COMMERCIAL

## 2023-04-12 VITALS
DIASTOLIC BLOOD PRESSURE: 87 MMHG | HEIGHT: 72 IN | TEMPERATURE: 98 F | BODY MASS INDEX: 19.79 KG/M2 | SYSTOLIC BLOOD PRESSURE: 135 MMHG | WEIGHT: 146.13 LBS | HEART RATE: 57 BPM | OXYGEN SATURATION: 100 % | RESPIRATION RATE: 20 BRPM

## 2023-04-12 DIAGNOSIS — C71.9 GRADE IV ASTROCYTOMA: Primary | ICD-10-CM

## 2023-04-12 DIAGNOSIS — Z51.11 ENCOUNTER FOR ANTINEOPLASTIC CHEMOTHERAPY: ICD-10-CM

## 2023-04-12 PROCEDURE — 1159F MED LIST DOCD IN RCRD: CPT | Mod: CPTII,,, | Performed by: INTERNAL MEDICINE

## 2023-04-12 PROCEDURE — 99215 PR OFFICE/OUTPT VISIT, EST, LEVL V, 40-54 MIN: ICD-10-PCS | Mod: ,,, | Performed by: INTERNAL MEDICINE

## 2023-04-12 PROCEDURE — 1159F PR MEDICATION LIST DOCUMENTED IN MEDICAL RECORD: ICD-10-PCS | Mod: CPTII,,, | Performed by: INTERNAL MEDICINE

## 2023-04-12 PROCEDURE — 3008F BODY MASS INDEX DOCD: CPT | Mod: CPTII,,, | Performed by: INTERNAL MEDICINE

## 2023-04-12 PROCEDURE — 3075F SYST BP GE 130 - 139MM HG: CPT | Mod: CPTII,,, | Performed by: INTERNAL MEDICINE

## 2023-04-12 PROCEDURE — 1160F RVW MEDS BY RX/DR IN RCRD: CPT | Mod: CPTII,,, | Performed by: INTERNAL MEDICINE

## 2023-04-12 PROCEDURE — 3079F PR MOST RECENT DIASTOLIC BLOOD PRESSURE 80-89 MM HG: ICD-10-PCS | Mod: CPTII,,, | Performed by: INTERNAL MEDICINE

## 2023-04-12 PROCEDURE — 99215 OFFICE O/P EST HI 40 MIN: CPT | Mod: ,,, | Performed by: INTERNAL MEDICINE

## 2023-04-12 PROCEDURE — 3075F PR MOST RECENT SYSTOLIC BLOOD PRESS GE 130-139MM HG: ICD-10-PCS | Mod: CPTII,,, | Performed by: INTERNAL MEDICINE

## 2023-04-12 PROCEDURE — 3079F DIAST BP 80-89 MM HG: CPT | Mod: CPTII,,, | Performed by: INTERNAL MEDICINE

## 2023-04-12 PROCEDURE — 3008F PR BODY MASS INDEX (BMI) DOCUMENTED: ICD-10-PCS | Mod: CPTII,,, | Performed by: INTERNAL MEDICINE

## 2023-04-12 PROCEDURE — 1160F PR REVIEW ALL MEDS BY PRESCRIBER/CLIN PHARMACIST DOCUMENTED: ICD-10-PCS | Mod: CPTII,,, | Performed by: INTERNAL MEDICINE

## 2023-04-12 NOTE — PROGRESS NOTES
Referring provider: Dr. Sauceda  Reason for consult:  High-grade glioma     Treatment history   11/02/2022-12/15/2022:  Concurrent chemo RT with temozolomide 75 mg per m2 days daily  1/17/22- current: Temozolomide 150 mg/m2 day 1-5 q 28 days    Patient is a 32-year-old with no past medical history who [place] [facility] [place] with symptoms of nausea, vomiting and headaches.  He had a CT of his head done in the ER which showed a right frontal lobe mass concerning for malignancy.  He was transferred to [facility] for neurosurgical consultation.  He underwent a right frontoparietal craniotomy with near total resection of the frontal lobe mass, pathology from which revealed high-grade glioma pending further classification.  Patient was discharged from hospital soon after his resection without any complications.    He presented to clinic on 10/05/2022 to establish care to discuss further disease management.      Today, 03/15/23, patient denies any acute concerns since last follow-up visit with us. He reports mild nausea with temozolomide but uses ondansetron 30 minutes prior for relief.  He denies any fevers, chills, headaches, vision changes or focal weakness.  He denies any shortness of breath, cough, cold, decreased appetite or weight loss.    ECOG 0, worked in [employer] prior to his presentation, lives with his father, denies any family history of malignancy.  Former smoker, quit for many years, denies alcohol or recreational drug use.    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    Imaging   02/02/23 MRI Brain w/wo contrast: Findings consistent with large surgical resection defect in the anterior right frontal lobe. Interval evolution of the hemorrhage or proteinaceous debris within the resection cavity. Small focus of nodular enhancement at the dorsal surface of the resection.    09/21/2022 MRI brain with  and without contrast:  Findings consistent with recent craniotomy in the right frontal region for resection of a hemorrhagic mass with some persistent enhancement seen along with the mesial margin of the surgical bed suggesting some residual mass.  Fluid seen in the surgical bed with subdural hematoma seen on the right side as well as with air seen on craniotomy site.  Midline shift from right to left, intraventricular hemorrhage seen in the right lateral ventricle.    Laboratory  April 12, 2023.  CBC:  Hemoglobin 13.8 g, HCT 41% WBC :3,1900 platelets 270 K.  CMP:  Potassium in the normal range 3.6, with replacement.  Creatinine 1.32, total bilirubin 1.7      Past Medical History:   Diagnosis Date    Brain cancer        Past Surgical History:   Procedure Laterality Date    CRANIOTOMY FOR EXCISION OF INTRACRANIAL TUMOR      VENTRICULOSTOMY Left 9/20/2022    Procedure: VENTRICULOSTOMY;  Surgeon: Duglas Fowler MD;  Location: [facility];  Service: Neurosurgery;  Laterality: Left;       Family History   Problem Relation Age of Onset    Hypertension Father        Social History     Socioeconomic History    Marital status: Unknown   Tobacco Use    Smoking status: Never    Smokeless tobacco: Never   Substance and Sexual Activity    Alcohol use: Never    Drug use: Never       Current Outpatient Medications   Medication Sig Dispense Refill    folic acid (FOLVITE) 1 MG tablet Take 1 tablet (1 mg total) by mouth once daily. 30 tablet 4    levETIRAcetam (KEPPRA) 500 MG Tab Take 1 tablet (500 mg total) by mouth 2 (two) times daily. 60 tablet 11    ondansetron (ZOFRAN) 8 MG tablet Take 1 tablet (8 mg total) by mouth every 8 (eight) hours as needed for Nausea. 30 tablet 1    potassium chloride SA (K-DUR,KLOR-CON) 20 MEQ tablet Take 1 tablet (20 mEq total) by mouth once daily. 30 tablet 3    sulfamethoxazole-trimethoprim 800-160mg (BACTRIM DS) 800-160 mg Tab Take 1 tablet by mouth every Mon, Wed, Fri. 20 tablet 0    temozolomide  (TEMODAR) 20 MG capsule Take 20 mg by mouth Daily.      temozolomide (TEMODAR) 250 MG capsule Take 250 mg by mouth Daily.       No current facility-administered medications for this visit.       Review of patient's allergies indicates:  No Known Allergies    Review of system  CONSTITUTIONAL: no fevers, no chills, no weight loss, no fatigue, no weakness  HEMATOLOGIC: no abnormal bleeding, no abnormal bruising, no drenching night sweats  ONCOLOGIC: no new masses or lumps  HEENT: no vision loss, no tinnitus or hearing loss, no nose bleeding, no dysphagia, no odynophagia  CVS: no chest pain, no palpitations, no dyspnea on exertion  RESP: no shortness of breath, no hemoptysis, no cough  GI: no nausea, no vomiting, no diarrhea, no constipation, no melena, no hematochezia, no hematemesis, no abdominal pain, no increase in abdominal girth  : no dysuria, no hematuria, no hesitancy, no scrotal swelling, no discharge  INTEGUMENT: no rashes, no abnormal bruising, no nail pitting, no hyperpigmentation  NEURO: no falls, no memory loss, no paresthesias or dysesthesias, no urofecal incontinence or retention, no loss of strength on any extremity  MSK: no back pain, no new joint pain, no joint swelling  PSYCH: no suicidal or homicidal ideation, no depression, no insomnia, no anhedonia  ENDOCRINE: no heat or cold intolerance, no polyuria, no polydipsia      Physical Exam:  Blood pressure 135/87, pulse (!) 57, temperature 98 °F (36.7 °C), temperature source Oral, resp. rate 20, height 6' (1.829 m), weight 66.3 kg (146 lb 1.6 oz), SpO2 100 %.   ECOG PS 0  GA: AAOx3, NAD  HEENT: NCAT, PERRLA, EOMI, good dentition, moist oral mucous membranes, well-healed incision over the right frontal region  LYMPH: no cervical, axillary or supraclavicular adenopathy  CVS: s1s2 RRR, no M/R/G  RESP: CTA b/l, no crackles, no wheezes or rhonchi  ABD: soft, NT, ND, BS+, no hepatosplenomegaly  EXT: no deformities, no pedal edema  SKIN: no rashes, warm and  dry  NEURO: normal mentation, strength 5/5 on all 4 extremities, no sensory deficits, cranial nerves 2-12 grossly intact      Assessment and plan    # Astrocytoma, WHO grade 4, IDH1 mutated,  ATRX mutated, TP53 mutated MGMT methylation present.   Status post gross total resection on 09/20/2022   Reviewed pathology report, noted grade 4 astrocytoma, IDH1 mutated, ATRX mutated and MGMT methylation present  Discussed with patient and his father the diagnosis, aggressive nature of disease, poor prognosis and treatment recommendations including adjuvant chemoradiation followed by chemotherapy with Temodar.    Discussed the option to explore clinical trials given patient's young age and good ECOG.  Prescription for Temodar 75 mg per m2 days 1-7 concurrently with radiation sent on his initial visit  Patient started concurrent chemo RT on 11/02/2022.  Noted updated pathology report with neuro are expanded panel and integrated diagnosis of astrocytoma, grade 4, IDH mutant.  Will plan for adjuvant temozolomide for 12 months following completion of concurrent chemo RT.         Plan   Reviewed labs, Will proceed with cycle # 4 of temozolomide 150 mg per m2 p.o.  early in 290 mg daily on days 1-5 Q 28 days  Start Bactrim double-strength tablet Monday Wednesday Friday  C/w oral potassium. Refill sent today. K+ 3.3  follow-up in 4 weeks prior to cycle 4, it would be my 1st he wants to see Dr. Renee  Patient will repeat MRI w/ OV to follow next month 3/23/23 Dr Anthony(RT)

## 2023-04-25 ENCOUNTER — TELEPHONE (OUTPATIENT)
Dept: NEUROSURGERY | Facility: CLINIC | Age: 33
End: 2023-04-25
Payer: COMMERCIAL

## 2023-04-25 NOTE — TELEPHONE ENCOUNTER
I spoke to the patient on the phone and advised him that as long as his medicine is in the original container with his name he should not have any problems with bringing it aboard the cruise ship. Pt verbalized understanding.

## 2023-05-10 ENCOUNTER — OFFICE VISIT (OUTPATIENT)
Dept: HEMATOLOGY/ONCOLOGY | Facility: CLINIC | Age: 33
End: 2023-05-10
Payer: COMMERCIAL

## 2023-05-10 VITALS
WEIGHT: 139.88 LBS | TEMPERATURE: 98 F | HEART RATE: 65 BPM | BODY MASS INDEX: 18.95 KG/M2 | OXYGEN SATURATION: 99 % | HEIGHT: 72 IN | DIASTOLIC BLOOD PRESSURE: 84 MMHG | RESPIRATION RATE: 18 BRPM | SYSTOLIC BLOOD PRESSURE: 139 MMHG

## 2023-05-10 DIAGNOSIS — C71.9 GRADE IV ASTROCYTOMA: Primary | ICD-10-CM

## 2023-05-10 DIAGNOSIS — Z51.11 ENCOUNTER FOR ANTINEOPLASTIC CHEMOTHERAPY: ICD-10-CM

## 2023-05-10 PROCEDURE — 1159F PR MEDICATION LIST DOCUMENTED IN MEDICAL RECORD: ICD-10-PCS | Mod: CPTII,,, | Performed by: STUDENT IN AN ORGANIZED HEALTH CARE EDUCATION/TRAINING PROGRAM

## 2023-05-10 PROCEDURE — 99215 OFFICE O/P EST HI 40 MIN: CPT | Mod: ,,, | Performed by: STUDENT IN AN ORGANIZED HEALTH CARE EDUCATION/TRAINING PROGRAM

## 2023-05-10 PROCEDURE — 1159F MED LIST DOCD IN RCRD: CPT | Mod: CPTII,,, | Performed by: STUDENT IN AN ORGANIZED HEALTH CARE EDUCATION/TRAINING PROGRAM

## 2023-05-10 PROCEDURE — 3075F PR MOST RECENT SYSTOLIC BLOOD PRESS GE 130-139MM HG: ICD-10-PCS | Mod: CPTII,,, | Performed by: STUDENT IN AN ORGANIZED HEALTH CARE EDUCATION/TRAINING PROGRAM

## 2023-05-10 PROCEDURE — 3079F DIAST BP 80-89 MM HG: CPT | Mod: CPTII,,, | Performed by: STUDENT IN AN ORGANIZED HEALTH CARE EDUCATION/TRAINING PROGRAM

## 2023-05-10 PROCEDURE — 3079F PR MOST RECENT DIASTOLIC BLOOD PRESSURE 80-89 MM HG: ICD-10-PCS | Mod: CPTII,,, | Performed by: STUDENT IN AN ORGANIZED HEALTH CARE EDUCATION/TRAINING PROGRAM

## 2023-05-10 PROCEDURE — 3075F SYST BP GE 130 - 139MM HG: CPT | Mod: CPTII,,, | Performed by: STUDENT IN AN ORGANIZED HEALTH CARE EDUCATION/TRAINING PROGRAM

## 2023-05-10 PROCEDURE — 99215 PR OFFICE/OUTPT VISIT, EST, LEVL V, 40-54 MIN: ICD-10-PCS | Mod: ,,, | Performed by: STUDENT IN AN ORGANIZED HEALTH CARE EDUCATION/TRAINING PROGRAM

## 2023-05-10 PROCEDURE — 3008F PR BODY MASS INDEX (BMI) DOCUMENTED: ICD-10-PCS | Mod: CPTII,,, | Performed by: STUDENT IN AN ORGANIZED HEALTH CARE EDUCATION/TRAINING PROGRAM

## 2023-05-10 PROCEDURE — 3008F BODY MASS INDEX DOCD: CPT | Mod: CPTII,,, | Performed by: STUDENT IN AN ORGANIZED HEALTH CARE EDUCATION/TRAINING PROGRAM

## 2023-05-10 NOTE — PROGRESS NOTES
Referring provider: Dr. Sauceda  Reason for consult:  High-grade glioma     Diagnosis   Astrocytoma, WHO grade 4, IDH1 mutated,  ATRX mutated, TP53 mutated MGMT methylation present.     Treatment history   11/02/2022-12/15/2022:  Concurrent chemo RT with temozolomide 75 mg per m2 days daily  1/17/22- current: Temozolomide 150 mg/m2 day 1-5 q 28 days    Patient is a 32-year-old with no past medical history who presented to the ER at [facility] [place] on 09/20/2022 with symptoms of nausea, vomiting and headaches.  He had a CT of his head done in the ER which showed a right frontal lobe mass concerning for malignancy.  He was transferred to [facility] for neurosurgical consultation.  He underwent a right frontoparietal craniotomy with near total resection of the frontal lobe mass, pathology from which revealed high-grade glioma pending further classification.  Patient was discharged from hospital soon after his resection without any complications.    He presented to clinic on 10/05/2022 to establish care to discuss further disease management.      ECOG 0, worked in [employer] prior to his presentation, lives with his father, denies any family history of malignancy.  Former smoker, quit for many years, denies alcohol or recreational drug use.    Interval history     Today, 05/10/2023, patient denies any acute concerns.  He reports tolerating Temodar well without any significant side effects.  He denies any nausea, vomiting, decreased appetite, weight loss.  Denies any headaches, vision changes or focal weakness.  Patient finished cycle 5 of Temodar from 5/3/23-5/7/23      Pathology   09/20/2022:  Right frontal hemorrhagic mass resection-high-grade glioma, grade 4 pending IDH and MGMT analysis.    Imaging     03/20/2023 MRI brain with and without contrast:  Post resection of anterior right frontal lobe mass with no acute intracranial pathology and no significant interval change compared to the most recent prior MRI brain  dated 02/02/2023.    02/02/23 MRI Brain w/wo contrast: Findings consistent with large surgical resection defect in the anterior right frontal lobe. Interval evolution of the hemorrhage or proteinaceous debris within the resection cavity. Small focus of nodular enhancement at the dorsal surface of the resection.    09/21/2022 MRI brain with and without contrast:  Findings consistent with recent craniotomy in the right frontal region for resection of a hemorrhagic mass with some persistent enhancement seen along with the mesial margin of the surgical bed suggesting some residual mass.  Fluid seen in the surgical bed with subdural hematoma seen on the right side as well as with air seen on craniotomy site.  Midline shift from right to left, intraventricular hemorrhage seen in the right lateral ventricle.    Laboratory  05/09/2023:  Creatinine 1.38, albumin 4.3, calcium 9.7, LFTs within normal limits, WBC count 1.9, hemoglobin 13.1, MCV 87.9, platelet count 128, ANC 0.92.  02/13/23: Cr 1.30, alb 4.3, ca 9.9, LFTs WNL, TB 1.3, mag 1.9, K+ 3.5, phosphorus 3.7, folic acid 4.89, B12 247, wbc 3.23, hgb 11.9, plt 129, ANC 2.10  01/15/2023:  Creatinine 1.20, albumin 4.6, calcium 10.0, alkaline phosphatase 58, total bili 2.2, AST 19, ALT 15, potassium 3.1, WBC count 3.69, hemoglobin 13.9, platelet 251, ANC 1.89.  12/14/2022:  Creatinine 1.53, albumin 4.1, calcium 9.6, LFTs within normal limits, WBC count 3.9, hemoglobin 14.4, MCV 87, platelet count 358, ANC 2.01  11/30/22: cr 1.43, alb 4.0, ca 10, alkphos 66, LFTs WNL, wbc 5.05, hgb 14.9, plt 302, ANC 2.91  11/15/2022:  Creatinine 1.05, albumin 4.3, calcium 10.5, alk-phos 59, total bili 1.1, AST 30, ALT 54, WBC 8.6, hemoglobin 15.2, MCV 88.7, platelet count 493, ANC 5.21.  ALC 2.29.  11/06/2022:  Creatinine 1.04, albumin 4.2, calcium 10.5, alkaline phosphatase 69, total bili 1.2, AST 26, ALT 54, WBC count 7.95, hemoglobin 15.1, MCV 87.6, platelet count 523, ANC 5.16          Past Medical History:   Diagnosis Date    Brain cancer        Past Surgical History:   Procedure Laterality Date    CRANIOTOMY FOR EXCISION OF INTRACRANIAL TUMOR      VENTRICULOSTOMY Left 9/20/2022    Procedure: VENTRICULOSTOMY;  Surgeon: Duglas Fowler MD;  Location: [facility];  Service: Neurosurgery;  Laterality: Left;       Family History   Problem Relation Age of Onset    Hypertension Father        Social History     Socioeconomic History    Marital status: Unknown   Tobacco Use    Smoking status: Never    Smokeless tobacco: Never   Substance and Sexual Activity    Alcohol use: Never    Drug use: Never       Current Outpatient Medications   Medication Sig Dispense Refill    folic acid (FOLVITE) 1 MG tablet Take 1 tablet (1 mg total) by mouth once daily. 30 tablet 4    levETIRAcetam (KEPPRA) 500 MG Tab Take 1 tablet (500 mg total) by mouth 2 (two) times daily. 60 tablet 11    ondansetron (ZOFRAN) 8 MG tablet Take 1 tablet (8 mg total) by mouth every 8 (eight) hours as needed for Nausea. 30 tablet 1    potassium chloride SA (K-DUR,KLOR-CON) 20 MEQ tablet Take 1 tablet (20 mEq total) by mouth once daily. 30 tablet 3    sulfamethoxazole-trimethoprim 800-160mg (BACTRIM DS) 800-160 mg Tab Take 1 tablet by mouth every Mon, Wed, Fri. 20 tablet 0    temozolomide (TEMODAR) 20 MG capsule Take 20 mg by mouth Daily.      temozolomide (TEMODAR) 250 MG capsule Take 250 mg by mouth Daily.       No current facility-administered medications for this visit.       Review of patient's allergies indicates:  No Known Allergies    Review of system  CONSTITUTIONAL: no fevers, no chills, no weight loss, no fatigue, no weakness  HEMATOLOGIC: no abnormal bleeding, no abnormal bruising, no drenching night sweats  ONCOLOGIC: no new masses or lumps  HEENT: no vision loss, no tinnitus or hearing loss, no nose bleeding, no dysphagia, no odynophagia  CVS: no chest pain, no palpitations, no dyspnea on exertion  RESP: no shortness of breath,  no hemoptysis, no cough  GI: no nausea, no vomiting, no diarrhea, no constipation, no melena, no hematochezia, no hematemesis, no abdominal pain, no increase in abdominal girth  : no dysuria, no hematuria, no hesitancy, no scrotal swelling, no discharge  INTEGUMENT: no rashes, no abnormal bruising, no nail pitting, no hyperpigmentation  NEURO: no falls, no memory loss, no paresthesias or dysesthesias, no urofecal incontinence or retention, no loss of strength on any extremity  MSK: no back pain, no new joint pain, no joint swelling  PSYCH: no suicidal or homicidal ideation, no depression, no insomnia, no anhedonia  ENDOCRINE: no heat or cold intolerance, no polyuria, no polydipsia      Physical Exam:  Blood pressure 139/84, pulse 65, temperature 98.2 °F (36.8 °C), temperature source Oral, resp. rate 18, height 6' (1.829 m), weight 63.5 kg (139 lb 14.4 oz), SpO2 99 %.   ECOG PS 0  GA: AAOx3, NAD  HEENT: NCAT, PERRLA, EOMI, good dentition, moist oral mucous membranes, well-healed incision over the right frontal region  LYMPH: no cervical, axillary or supraclavicular adenopathy  CVS: s1s2 RRR, no M/R/G  RESP: CTA b/l, no crackles, no wheezes or rhonchi  ABD: soft, NT, ND, BS+, no hepatosplenomegaly  EXT: no deformities, no pedal edema  SKIN: no rashes, warm and dry  NEURO: normal mentation, strength 5/5 on all 4 extremities, no sensory deficits, cranial nerves 2-12 grossly intact      Assessment and plan    # Astrocytoma, WHO grade 4, IDH1 mutated,  ATRX mutated, TP53 mutated MGMT methylation present.   Status post gross total resection on 09/20/2022   Reviewed pathology report, noted grade 4 astrocytoma, IDH1 mutated, ATRX mutated and MGMT methylation present  Discussed with patient and his father the diagnosis, aggressive nature of disease, poor prognosis and treatment recommendations including adjuvant chemoradiation followed by chemotherapy with Temodar.    Discussed the option to explore clinical trials given  patient's young age and good ECOG.  Prescription for Temodar 75 mg per m2 days 1-7 concurrently with radiation sent on his initial visit  Patient started concurrent chemo RT on 11/02/2022.  Noted updated pathology report with neuro are expanded panel and integrated diagnosis of astrocytoma, grade 4, IDH mutant.  Will plan for adjuvant temozolomide for 12 months following completion of concurrent chemo RT.         Plan   For some reason his Temodar and his follow-up appointment with me was not on the same day.  Patient finished his cycle 5 of Temodar from 05/03/2023 to 05/07/2023.    Discussed and counseled patient to start his cycle 6 of Temodar on 05/31/23 after seeing us in clinic.  Will plan for labs the day prior to ensure patient's WBC count, ANC and platelet count are above threshold.  C/w Bactrim double-strength tablet Monday Wednesday Friday  follow-up in 5 weeks prior to cycle 6            Portions of the record may have been created with voice recognition software. Occasional wrong-word or sound-a-like substitutions may have occurred due to the inherent limitations of voice recognition software. Read the chart carefully and recognize, using context, where substitutions have occurred.

## 2023-05-23 ENCOUNTER — TELEPHONE (OUTPATIENT)
Dept: NEUROSURGERY | Facility: CLINIC | Age: 33
End: 2023-05-23
Payer: COMMERCIAL

## 2023-05-23 NOTE — TELEPHONE ENCOUNTER
The patient called because he has a trip coming up in November and he will be traveling by plane.  He is concerned about setting off the metal detectors due to the plate and screws from the surgery.  I informed him that they were made of titanium and to my knowledge, will not set off a metal detector.  He asked if I could put something on letterhead regarding the surgery and hardware for him to show security at the airport in case he needed.  Letter was typed and sent via patient portal.  I told him there would be a copy in his chart as well if he needed.  He is scheduled to follow up in September.  He says he has continued to do well since the surgery and is back at work.  He will call if he needs anything prior to his follow up.

## 2023-05-23 NOTE — LETTER
[facility]  [address] [place] [address] [address], [address]  [place] [address]  Phone: [phone] May 23, 2023    Michael Anthony  [address]  [facility] [address]      To Whom It May Concern:    Michael Anthony is under my care for a brain tumor.  He underwent right frontoparietal craniotomy for resection of the tumor on 9/20/22.  During the procedure, titanium plate and screws were used to secure the bone back in place.    If you have any questions or concerns, please feel free to call my office.    Sincerely,    Duglas Fowler MD FACS [facility]

## 2023-05-26 DIAGNOSIS — C71.9 HIGH GRADE GLIOMA NOT CLASSIFIABLE BY WHO CRITERIA: ICD-10-CM

## 2023-05-29 RX ORDER — ONDANSETRON HYDROCHLORIDE 8 MG/1
8 TABLET, FILM COATED ORAL EVERY 8 HOURS PRN
Qty: 30 TABLET | Refills: 1 | Status: SHIPPED | OUTPATIENT
Start: 2023-05-29 | End: 2023-09-26 | Stop reason: SDUPTHER

## 2023-05-31 ENCOUNTER — OFFICE VISIT (OUTPATIENT)
Dept: HEMATOLOGY/ONCOLOGY | Facility: CLINIC | Age: 33
End: 2023-05-31
Payer: COMMERCIAL

## 2023-05-31 VITALS
DIASTOLIC BLOOD PRESSURE: 87 MMHG | WEIGHT: 143.69 LBS | HEIGHT: 72 IN | HEART RATE: 66 BPM | TEMPERATURE: 97 F | SYSTOLIC BLOOD PRESSURE: 150 MMHG | BODY MASS INDEX: 19.46 KG/M2 | OXYGEN SATURATION: 100 % | RESPIRATION RATE: 20 BRPM

## 2023-05-31 DIAGNOSIS — C71.9 GRADE IV ASTROCYTOMA: Primary | ICD-10-CM

## 2023-05-31 DIAGNOSIS — Z51.11 ENCOUNTER FOR ANTINEOPLASTIC CHEMOTHERAPY: ICD-10-CM

## 2023-05-31 PROCEDURE — 3077F SYST BP >= 140 MM HG: CPT | Mod: CPTII,,, | Performed by: INTERNAL MEDICINE

## 2023-05-31 PROCEDURE — 3008F PR BODY MASS INDEX (BMI) DOCUMENTED: ICD-10-PCS | Mod: CPTII,,, | Performed by: INTERNAL MEDICINE

## 2023-05-31 PROCEDURE — 1159F PR MEDICATION LIST DOCUMENTED IN MEDICAL RECORD: ICD-10-PCS | Mod: CPTII,,, | Performed by: INTERNAL MEDICINE

## 2023-05-31 PROCEDURE — 3079F DIAST BP 80-89 MM HG: CPT | Mod: CPTII,,, | Performed by: INTERNAL MEDICINE

## 2023-05-31 PROCEDURE — 99215 PR OFFICE/OUTPT VISIT, EST, LEVL V, 40-54 MIN: ICD-10-PCS | Mod: ,,, | Performed by: INTERNAL MEDICINE

## 2023-05-31 PROCEDURE — 3079F PR MOST RECENT DIASTOLIC BLOOD PRESSURE 80-89 MM HG: ICD-10-PCS | Mod: CPTII,,, | Performed by: INTERNAL MEDICINE

## 2023-05-31 PROCEDURE — 99215 OFFICE O/P EST HI 40 MIN: CPT | Mod: ,,, | Performed by: INTERNAL MEDICINE

## 2023-05-31 PROCEDURE — 3008F BODY MASS INDEX DOCD: CPT | Mod: CPTII,,, | Performed by: INTERNAL MEDICINE

## 2023-05-31 PROCEDURE — 1160F RVW MEDS BY RX/DR IN RCRD: CPT | Mod: CPTII,,, | Performed by: INTERNAL MEDICINE

## 2023-05-31 PROCEDURE — 1160F PR REVIEW ALL MEDS BY PRESCRIBER/CLIN PHARMACIST DOCUMENTED: ICD-10-PCS | Mod: CPTII,,, | Performed by: INTERNAL MEDICINE

## 2023-05-31 PROCEDURE — 1159F MED LIST DOCD IN RCRD: CPT | Mod: CPTII,,, | Performed by: INTERNAL MEDICINE

## 2023-05-31 PROCEDURE — 3077F PR MOST RECENT SYSTOLIC BLOOD PRESSURE >= 140 MM HG: ICD-10-PCS | Mod: CPTII,,, | Performed by: INTERNAL MEDICINE

## 2023-05-31 NOTE — PROGRESS NOTES
Referring provider: Dr. Sauceda  Reason for consult:  High-grade glioma     Diagnosis   Astrocytoma, WHO grade 4, IDH1 mutated,  ATRX mutated, TP53 mutated MGMT methylation present.     Treatment history   11/02/2022-12/15/2022:  Concurrent chemo RT with temozolomide 75 mg per m2 days daily  1/17/22- current: Temozolomide 150 mg/m2 day 1-5 q 28 days    Patient is a 32-year-old with no past medical history who presented to the ER at [facility] on 09/20/2022 with symptoms of nausea, vomiting and headaches.  He had a CT of his head done in the ER which showed a right frontal lobe mass concerning for malignancy.  He was transferred to [facility] for neurosurgical consultation.  He underwent a right frontoparietal craniotomy with near total resection of the frontal lobe mass, pathology from which revealed high-grade glioma pending further classification.  Patient was discharged from hospital soon after his resection without any complications.    He presented to clinic on 10/05/2022 to establish care to discuss further disease management.      ECOG 0, worked in [employer] prior to his presentation, lives with his father, denies any family history of malignancy.  Former smoker, quit for many years, denies alcohol or recreational drug use.    Interval history   Follow up clinic visit May 31, 2023 .  Michael who is 33 years old, comes with his father to continue treatment with temozolomide for a glioblastoma of the right frontal lobe..   any     He reports tolerating Temodar well without any significant side effects.  He denies any nausea, vomiting, decreased appetite, weight loss.  Denies any headaches, vision changes or focal weakness.  To start cycle 6, at doses of 290 mg of temozolomide, days 1 through 5..  He was given the go ahead today, given his normal CBC.      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    Imaging     03/20/2023 MRI  brain with and without contrast:  Post resection of anterior right frontal lobe mass with no acute intracranial pathology and no significant interval change compared to the most recent prior MRI brain dated 02/02/2023.    02/02/23 MRI Brain w/wo contrast: Findings consistent with large surgical resection defect in the anterior right frontal lobe. Interval evolution of the hemorrhage or proteinaceous debris within the resection cavity. Small focus of nodular enhancement at the dorsal surface of the resection.    09/21/2022 MRI brain with and without contrast:  Findings consistent with recent craniotomy in the right frontal region for resection of a hemorrhagic mass with some persistent enhancement seen along with the mesial margin of the surgical bed suggesting some residual mass.  Fluid seen in the surgical bed with subdural hematoma seen on the right side as well as with air seen on craniotomy site.  Midline shift from right to left, intraventricular hemorrhage seen in the right lateral ventricle.    Laboratory  May 30, 2023:  Hemoglobin 13.3, WBC is 4500, platelets 339 K, ANC 2470. .  CMP:  Creatinine 1.27 just above the upper limits of normal, sodium; 146      Past Medical History:   Diagnosis Date    Brain cancer        Past Surgical History:   Procedure Laterality Date    CRANIOTOMY FOR EXCISION OF INTRACRANIAL TUMOR      VENTRICULOSTOMY Left 9/20/2022    Procedure: VENTRICULOSTOMY;  Surgeon: Duglas Fowler MD;  Location: [facility];  Service: Neurosurgery;  Laterality: Left;       Family History   Problem Relation Age of Onset    Hypertension Father        Social History     Socioeconomic History    Marital status: Unknown   Tobacco Use    Smoking status: Never    Smokeless tobacco: Never   Substance and Sexual Activity    Alcohol use: Never    Drug use: Never       Current Outpatient Medications   Medication Sig Dispense Refill    folic acid (FOLVITE) 1 MG tablet Take 1 tablet (1 mg total) by mouth once daily.  30 tablet 4    levETIRAcetam (KEPPRA) 500 MG Tab Take 1 tablet (500 mg total) by mouth 2 (two) times daily. 60 tablet 11    ondansetron (ZOFRAN) 8 MG tablet Take 1 tablet (8 mg total) by mouth every 8 (eight) hours as needed for Nausea. 30 tablet 1    potassium chloride SA (K-DUR,KLOR-CON) 20 MEQ tablet Take 1 tablet (20 mEq total) by mouth once daily. 30 tablet 3    sulfamethoxazole-trimethoprim 800-160mg (BACTRIM DS) 800-160 mg Tab Take 1 tablet by mouth every Mon, Wed, Fri. 20 tablet 0    temozolomide (TEMODAR) 20 MG capsule Take 20 mg by mouth Daily.      temozolomide (TEMODAR) 250 MG capsule Take 250 mg by mouth Daily.       No current facility-administered medications for this visit.       Review of patient's allergies indicates:  No Known Allergies    Review of system  CONSTITUTIONAL: no fevers, no chills, no weight loss, no fatigue, no weakness  HEMATOLOGIC: no abnormal bleeding, no abnormal bruising, no drenching night sweats  ONCOLOGIC: no new masses or lumps  HEENT: no vision loss, no tinnitus or hearing loss, no nose bleeding, no dysphagia, no odynophagia  CVS: no chest pain, no palpitations, no dyspnea on exertion  RESP: no shortness of breath, no hemoptysis, no cough  GI: no nausea, no vomiting, no diarrhea, no constipation, no melena, no hematochezia, no hematemesis, no abdominal pain, no increase in abdominal girth  : no dysuria, no hematuria, no hesitancy, no scrotal swelling, no discharge  INTEGUMENT: no rashes, no abnormal bruising, no nail pitting, no hyperpigmentation  NEURO: no falls, no memory loss, no paresthesias or dysesthesias, no urofecal incontinence or retention, no loss of strength on any extremity  MSK: no back pain, no new joint pain, no joint swelling  PSYCH: no suicidal or homicidal ideation, no depression, no insomnia, no anhedonia  ENDOCRINE: no heat or cold intolerance, no polyuria, no polydipsia      Physical Exam:  Blood pressure (!) 150/87, pulse 66, temperature 97.4 °F  (36.3 °C), temperature source Oral, resp. rate 20, height 6' (1.829 m), weight 65.2 kg (143 lb 11.2 oz), SpO2 100 %.   ECOG PS 0  GA: AAOx3, NAD  HEENT: NCAT, PERRLA, EOMI, good dentition, moist oral mucous membranes, well-healed incision over the right frontal region  LYMPH: no cervical, axillary or supraclavicular adenopathy  CVS: s1s2 RRR, no M/R/G  RESP: CTA b/l, no crackles, no wheezes or rhonchi  ABD: soft, NT, ND, BS+, no hepatosplenomegaly  EXT: no deformities, no pedal edema  SKIN: no rashes, warm and dry  NEURO: normal mentation, strength 5/5 on all 4 extremities, no sensory deficits, cranial nerves 2-12 grossly intact      Assessment and plan    # Astrocytoma, WHO grade 4, IDH1 mutated,  ATRX mutated, TP53 mutated MGMT methylation present.   Status post gross total resection on 09/20/2022   Reviewed pathology report, noted grade 4 astrocytoma, IDH1 mutated, ATRX mutated and MGMT methylation present  Discussed with patient and his father the diagnosis, aggressive nature of disease, poor prognosis and treatment recommendations including adjuvant chemoradiation followed by chemotherapy with Temodar.    Discussed the option to explore clinical trials given patient's young age and good ECOG.  Prescription for Temodar 75 mg per m2 days 1-7 concurrently with radiation sent on his initial visit  Patient started concurrent chemo RT on 11/02/2022.  Noted updated pathology report with neuro are expanded panel and integrated diagnosis of astrocytoma, grade 4, IDH mutant.  Will plan for adjuvant temozolomide for 12 months following completion of concurrent chemo RT.       Plan  Patient finished his cycle 5 of Temodar from 05/03/2023 to 05/07/2023. ,l    Discussed and counseled patient to start his cycle 6 of Temodar on 05/31/23 on today's visit.  CBC and CMP are adequate for treat C/w Bactrim double-strength tablet Monday Wednesday Friday  follow-up in 4-5 weeks for course # 7.  June 28th, 2023.    Miguel Huber  MD

## 2023-06-05 ENCOUNTER — TELEPHONE (OUTPATIENT)
Dept: NEUROSURGERY | Facility: CLINIC | Age: 33
End: 2023-06-05
Payer: COMMERCIAL

## 2023-06-05 NOTE — TELEPHONE ENCOUNTER
Patient called in wanting to know if it is safe for him to fly since having brain surgery. Please advise?

## 2023-06-28 ENCOUNTER — OFFICE VISIT (OUTPATIENT)
Dept: HEMATOLOGY/ONCOLOGY | Facility: CLINIC | Age: 33
End: 2023-06-28
Payer: COMMERCIAL

## 2023-06-28 VITALS
SYSTOLIC BLOOD PRESSURE: 151 MMHG | TEMPERATURE: 98 F | DIASTOLIC BLOOD PRESSURE: 92 MMHG | HEART RATE: 50 BPM | BODY MASS INDEX: 18.73 KG/M2 | WEIGHT: 138.31 LBS | OXYGEN SATURATION: 99 % | HEIGHT: 72 IN | RESPIRATION RATE: 18 BRPM

## 2023-06-28 DIAGNOSIS — Z51.11 ENCOUNTER FOR ANTINEOPLASTIC CHEMOTHERAPY: Primary | ICD-10-CM

## 2023-06-28 DIAGNOSIS — C71.9 GRADE IV ASTROCYTOMA: ICD-10-CM

## 2023-06-28 PROCEDURE — 3008F BODY MASS INDEX DOCD: CPT | Mod: CPTII,,, | Performed by: STUDENT IN AN ORGANIZED HEALTH CARE EDUCATION/TRAINING PROGRAM

## 2023-06-28 PROCEDURE — 99215 OFFICE O/P EST HI 40 MIN: CPT | Mod: ,,, | Performed by: STUDENT IN AN ORGANIZED HEALTH CARE EDUCATION/TRAINING PROGRAM

## 2023-06-28 PROCEDURE — 3080F DIAST BP >= 90 MM HG: CPT | Mod: CPTII,,, | Performed by: STUDENT IN AN ORGANIZED HEALTH CARE EDUCATION/TRAINING PROGRAM

## 2023-06-28 PROCEDURE — 99215 PR OFFICE/OUTPT VISIT, EST, LEVL V, 40-54 MIN: ICD-10-PCS | Mod: ,,, | Performed by: STUDENT IN AN ORGANIZED HEALTH CARE EDUCATION/TRAINING PROGRAM

## 2023-06-28 PROCEDURE — 3077F PR MOST RECENT SYSTOLIC BLOOD PRESSURE >= 140 MM HG: ICD-10-PCS | Mod: CPTII,,, | Performed by: STUDENT IN AN ORGANIZED HEALTH CARE EDUCATION/TRAINING PROGRAM

## 2023-06-28 PROCEDURE — 1159F MED LIST DOCD IN RCRD: CPT | Mod: CPTII,,, | Performed by: STUDENT IN AN ORGANIZED HEALTH CARE EDUCATION/TRAINING PROGRAM

## 2023-06-28 PROCEDURE — 3080F PR MOST RECENT DIASTOLIC BLOOD PRESSURE >= 90 MM HG: ICD-10-PCS | Mod: CPTII,,, | Performed by: STUDENT IN AN ORGANIZED HEALTH CARE EDUCATION/TRAINING PROGRAM

## 2023-06-28 PROCEDURE — 3008F PR BODY MASS INDEX (BMI) DOCUMENTED: ICD-10-PCS | Mod: CPTII,,, | Performed by: STUDENT IN AN ORGANIZED HEALTH CARE EDUCATION/TRAINING PROGRAM

## 2023-06-28 PROCEDURE — 1159F PR MEDICATION LIST DOCUMENTED IN MEDICAL RECORD: ICD-10-PCS | Mod: CPTII,,, | Performed by: STUDENT IN AN ORGANIZED HEALTH CARE EDUCATION/TRAINING PROGRAM

## 2023-06-28 PROCEDURE — 3077F SYST BP >= 140 MM HG: CPT | Mod: CPTII,,, | Performed by: STUDENT IN AN ORGANIZED HEALTH CARE EDUCATION/TRAINING PROGRAM

## 2023-06-28 NOTE — PROGRESS NOTES
Referring provider: Dr. Sauceda  Reason for consult:  High-grade glioma     Diagnosis   Astrocytoma, WHO grade 4, IDH1 mutated,  ATRX mutated, TP53 mutated MGMT methylation present.       Current treatment    1/17/23- current: Temozolomide 150 mg/m2 day 1-5 q 28 days    Treatment history     11/02/2022-12/15/2022:  Concurrent chemo RT with temozolomide 75 mg per m2 days daily      Patient is a 32-year-old with no past medical history who presented to the ER at [facility] on 09/20/2022 with symptoms of nausea, vomiting and headaches.  He had a CT of his head done in the ER which showed a right frontal lobe mass concerning for malignancy.  He was transferred to [facility] for neurosurgical consultation.  He underwent a right frontoparietal craniotomy with near total resection of the frontal lobe mass, pathology from which revealed high-grade glioma pending further classification.  Patient was discharged from hospital soon after his resection without any complications.    He presented to clinic on 10/05/2022 to establish care to discuss further disease management.      ECOG 0, worked in [employer] prior to his presentation, lives with his father, denies any family history of malignancy.  Former smoker, quit for many years, denies alcohol or recreational drug use.    Interval history     Today, 06/28/23, patient denies any acute concerns since last follow-up visit with us.  He reports tolerating his last cycle of chemotherapy with Temodar well without any significant side effects.  He reports taking antinausea medications 30 minutes before his treatment which helps with nausea.      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    Imaging     05/23/2023 MRI brain with and without contrast:  Post resection of the right anterior frontal lobe mass with no acute intracranial pathology and no significant interval change at the resection cavity  compared to the most recent MRI from March and February 2023.  Left axillary more advanced than bilateral anterior ethmoid chronic sinusitis with no paranasal fluid.    03/20/2023 MRI brain with and without contrast:  Post resection of anterior right frontal lobe mass with no acute intracranial pathology and no significant interval change compared to the most recent prior MRI brain dated 02/02/2023.    02/02/23 MRI Brain w/wo contrast: Findings consistent with large surgical resection defect in the anterior right frontal lobe. Interval evolution of the hemorrhage or proteinaceous debris within the resection cavity. Small focus of nodular enhancement at the dorsal surface of the resection.    09/21/2022 MRI brain with and without contrast:  Findings consistent with recent craniotomy in the right frontal region for resection of a hemorrhagic mass with some persistent enhancement seen along with the mesial margin of the surgical bed suggesting some residual mass.  Fluid seen in the surgical bed with subdural hematoma seen on the right side as well as with air seen on craniotomy site.  Midline shift from right to left, intraventricular hemorrhage seen in the right lateral ventricle.    Laboratory    06/27/23: Creat 0.97, abl 4.5, lft's wnl, Bili 1.4, WBC 3.11, Hgb 13.6, MCV 89.6, , ANC 1.42  05/09/2023:  Creatinine 1.38, albumin 4.3, calcium 9.7, LFTs within normal limits, WBC count 1.9, hemoglobin 13.1, MCV 87.9, platelet count 128, ANC 0.92.  02/13/23: Cr 1.30, alb 4.3, ca 9.9, LFTs WNL, TB 1.3, mag 1.9, K+ 3.5, phosphorus 3.7, folic acid 4.89, B12 247, wbc 3.23, hgb 11.9, plt 129, ANC 2.10  01/15/2023:  Creatinine 1.20, albumin 4.6, calcium 10.0, alkaline phosphatase 58, total bili 2.2, AST 19, ALT 15, potassium 3.1, WBC count 3.69, hemoglobin 13.9, platelet 251, ANC 1.89.  12/14/2022:  Creatinine 1.53, albumin 4.1, calcium 9.6, LFTs within normal limits, WBC count 3.9, hemoglobin 14.4, MCV 87, platelet count  358, ANC 2.01  11/30/22: cr 1.43, alb 4.0, ca 10, alkphos 66, LFTs WNL, wbc 5.05, hgb 14.9, plt 302, ANC 2.91  11/15/2022:  Creatinine 1.05, albumin 4.3, calcium 10.5, alk-phos 59, total bili 1.1, AST 30, ALT 54, WBC 8.6, hemoglobin 15.2, MCV 88.7, platelet count 493, ANC 5.21.  ALC 2.29.  11/06/2022:  Creatinine 1.04, albumin 4.2, calcium 10.5, alkaline phosphatase 69, total bili 1.2, AST 26, ALT 54, WBC count 7.95, hemoglobin 15.1, MCV 87.6, platelet count 523, ANC 5.16         Past Medical History:   Diagnosis Date    Brain cancer        Past Surgical History:   Procedure Laterality Date    CRANIOTOMY FOR EXCISION OF INTRACRANIAL TUMOR      VENTRICULOSTOMY Left 9/20/2022    Procedure: VENTRICULOSTOMY;  Surgeon: Duglas Fowler MD;  Location: [facility];  Service: Neurosurgery;  Laterality: Left;       Family History   Problem Relation Age of Onset    Hypertension Father        Social History     Socioeconomic History    Marital status: Unknown   Tobacco Use    Smoking status: Never    Smokeless tobacco: Never   Substance and Sexual Activity    Alcohol use: Never    Drug use: Never       Current Outpatient Medications   Medication Sig Dispense Refill    folic acid (FOLVITE) 1 MG tablet Take 1 tablet (1 mg total) by mouth once daily. 30 tablet 4    levETIRAcetam (KEPPRA) 500 MG Tab Take 1 tablet (500 mg total) by mouth 2 (two) times daily. 60 tablet 11    ondansetron (ZOFRAN) 8 MG tablet Take 1 tablet (8 mg total) by mouth every 8 (eight) hours as needed for Nausea. 30 tablet 1    potassium chloride SA (K-DUR,KLOR-CON) 20 MEQ tablet Take 1 tablet (20 mEq total) by mouth once daily. 30 tablet 3    sulfamethoxazole-trimethoprim 800-160mg (BACTRIM DS) 800-160 mg Tab Take 1 tablet by mouth every Mon, Wed, Fri. 20 tablet 0    temozolomide (TEMODAR) 20 MG capsule Take 20 mg by mouth Daily.      temozolomide (TEMODAR) 250 MG capsule Take 250 mg by mouth Daily.       No current facility-administered medications for this visit.        Review of patient's allergies indicates:  No Known Allergies    Review of system  CONSTITUTIONAL: no fevers, no chills, no weight loss, no fatigue, no weakness  HEMATOLOGIC: no abnormal bleeding, no abnormal bruising, no drenching night sweats  ONCOLOGIC: no new masses or lumps  HEENT: no vision loss, no tinnitus or hearing loss, no nose bleeding, no dysphagia, no odynophagia  CVS: no chest pain, no palpitations, no dyspnea on exertion  RESP: no shortness of breath, no hemoptysis, no cough  GI: no nausea, no vomiting, no diarrhea, no constipation, no melena, no hematochezia, no hematemesis, no abdominal pain, no increase in abdominal girth  : no dysuria, no hematuria, no hesitancy, no scrotal swelling, no discharge  INTEGUMENT: no rashes, no abnormal bruising, no nail pitting, no hyperpigmentation  NEURO: no falls, no memory loss, no paresthesias or dysesthesias, no urofecal incontinence or retention, no loss of strength on any extremity  MSK: no back pain, no new joint pain, no joint swelling  PSYCH: no suicidal or homicidal ideation, no depression, no insomnia, no anhedonia  ENDOCRINE: no heat or cold intolerance, no polyuria, no polydipsia      Physical Exam:  There were no vitals taken for this visit.   ECOG PS 0  GA: AAOx3, NAD  HEENT: NCAT, PERRLA, EOMI, good dentition, moist oral mucous membranes, well-healed incision over the right frontal region  LYMPH: no cervical, axillary or supraclavicular adenopathy  CVS: s1s2 RRR, no M/R/G  RESP: CTA b/l, no crackles, no wheezes or rhonchi  ABD: soft, NT, ND, BS+, no hepatosplenomegaly  EXT: no deformities, no pedal edema  SKIN: no rashes, warm and dry  NEURO: normal mentation, strength 5/5 on all 4 extremities, no sensory deficits, cranial nerves 2-12 grossly intact      Assessment and plan    # Astrocytoma, WHO grade 4, IDH1 mutated,  ATRX mutated, TP53 mutated MGMT methylation present.   Status post gross total resection on 09/20/2022   Reviewed pathology  report, noted grade 4 astrocytoma, IDH1 mutated, ATRX mutated and MGMT methylation present  Discussed with patient and his father the diagnosis, aggressive nature of disease, poor prognosis and treatment recommendations including adjuvant chemoradiation followed by chemotherapy with Temodar.    Discussed the option to explore clinical trials given patient's young age and good ECOG.  Prescription for Temodar 75 mg per m2 days 1-7 concurrently with radiation sent on his initial visit  Patient started concurrent chemo RT on 11/02/2022.  Noted updated pathology report with neuro are expanded panel and integrated diagnosis of astrocytoma, grade 4, IDH mutant.  Will plan for adjuvant temozolomide for 12 months following completion of concurrent chemo RT.         Plan   Reviewed labs, patient will receive his Temodar today and will plan to start it tomorrow, 06/29/2023  Okay to stop Bactrim since his counts have recovered   Plan to see him on 07/31/2023 before his next cycle of treatment.          Portions of the record may have been created with voice recognition software. Occasional wrong-word or sound-a-like substitutions may have occurred due to the inherent limitations of voice recognition software. Read the chart carefully and recognize, using context, where substitutions have occurred.

## 2023-06-29 ENCOUNTER — PATIENT MESSAGE (OUTPATIENT)
Dept: ADMINISTRATIVE | Facility: OTHER | Age: 33
End: 2023-06-29
Payer: COMMERCIAL

## 2023-07-18 DIAGNOSIS — C71.9 GRADE IV ASTROCYTOMA: Primary | ICD-10-CM

## 2023-07-19 RX ORDER — TEMOZOLOMIDE 20 MG/1
CAPSULE ORAL
Qty: 10 CAPSULE | Refills: 0 | Status: SHIPPED | OUTPATIENT
Start: 2023-07-19 | End: 2023-08-17

## 2023-07-19 RX ORDER — TEMOZOLOMIDE 250 MG/1
CAPSULE ORAL
Qty: 5 CAPSULE | Refills: 0 | Status: SHIPPED | OUTPATIENT
Start: 2023-07-19 | End: 2023-08-17

## 2023-07-31 ENCOUNTER — OFFICE VISIT (OUTPATIENT)
Dept: HEMATOLOGY/ONCOLOGY | Facility: CLINIC | Age: 33
End: 2023-07-31
Payer: COMMERCIAL

## 2023-07-31 VITALS
WEIGHT: 139.63 LBS | TEMPERATURE: 98 F | HEART RATE: 63 BPM | OXYGEN SATURATION: 100 % | BODY MASS INDEX: 18.91 KG/M2 | SYSTOLIC BLOOD PRESSURE: 133 MMHG | HEIGHT: 72 IN | RESPIRATION RATE: 20 BRPM | DIASTOLIC BLOOD PRESSURE: 81 MMHG

## 2023-07-31 DIAGNOSIS — E53.8 FOLIC ACID DEFICIENCY: ICD-10-CM

## 2023-07-31 DIAGNOSIS — C71.9 GRADE IV ASTROCYTOMA: Primary | ICD-10-CM

## 2023-07-31 PROCEDURE — 3079F DIAST BP 80-89 MM HG: CPT | Mod: CPTII,,,

## 2023-07-31 PROCEDURE — 3008F BODY MASS INDEX DOCD: CPT | Mod: CPTII,,,

## 2023-07-31 PROCEDURE — 3075F SYST BP GE 130 - 139MM HG: CPT | Mod: CPTII,,,

## 2023-07-31 PROCEDURE — 1159F PR MEDICATION LIST DOCUMENTED IN MEDICAL RECORD: ICD-10-PCS | Mod: CPTII,,,

## 2023-07-31 PROCEDURE — 1159F MED LIST DOCD IN RCRD: CPT | Mod: CPTII,,,

## 2023-07-31 PROCEDURE — 3075F PR MOST RECENT SYSTOLIC BLOOD PRESS GE 130-139MM HG: ICD-10-PCS | Mod: CPTII,,,

## 2023-07-31 PROCEDURE — 3079F PR MOST RECENT DIASTOLIC BLOOD PRESSURE 80-89 MM HG: ICD-10-PCS | Mod: CPTII,,,

## 2023-07-31 PROCEDURE — 99214 OFFICE O/P EST MOD 30 MIN: CPT | Mod: ,,,

## 2023-07-31 PROCEDURE — 3008F PR BODY MASS INDEX (BMI) DOCUMENTED: ICD-10-PCS | Mod: CPTII,,,

## 2023-07-31 PROCEDURE — 99214 PR OFFICE/OUTPT VISIT, EST, LEVL IV, 30-39 MIN: ICD-10-PCS | Mod: ,,,

## 2023-07-31 RX ORDER — FOLIC ACID 1 MG/1
1 TABLET ORAL DAILY
Qty: 30 TABLET | Refills: 4 | Status: SHIPPED | OUTPATIENT
Start: 2023-07-31 | End: 2023-09-26 | Stop reason: SDUPTHER

## 2023-07-31 NOTE — PROGRESS NOTES
Referring provider: Dr. Sauceda  Reason for consult:  High-grade glioma     Diagnosis   Astrocytoma, WHO grade 4, IDH1 mutated,  ATRX mutated, TP53 mutated MGMT methylation present.       Current treatment    1/17/23- current: Temozolomide 150 mg/m2 day 1-5 q 28 days    Treatment history     11/02/2022-12/15/2022:  Concurrent chemo RT with temozolomide 75 mg per m2 days daily      Patient is a 32-year-old with no past medical history who presented to the ER at [facility] [place] on [place]20/2022 with symptoms of nausea, vomiting and headaches.  He had a CT of his head done in the ER which showed a right frontal lobe mass concerning for malignancy.  He was transferred to [facility] for neurosurgical consultation.  He underwent a right frontoparietal craniotomy with near total resection of the frontal lobe mass, pathology from which revealed high-grade glioma pending further classification.  Patient was discharged from hospital soon after his resection without any complications.    He presented to clinic on 10/05/2022 to establish care to discuss further disease management.      ECOG 0, worked in [employer] prior to his presentation, lives with his father, denies any family history of malignancy.  Former smoker, quit for many years, denies alcohol or recreational drug use.    Interval history     Today, 06/28/23, patient denies any acute concerns since last follow-up visit with us.  He reports tolerating his last cycle of chemotherapy with Temodar well without any significant side effects.  He reports taking antinausea medications 30 minutes before his treatment which helps with nausea.      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    Imaging     07/25/2023 MRI brain w/w/o contrast: A large surgical resection defect is again seen at the anterior right frontal lobe, with surrounding gliosis.  Relatively thin rim enhancement is identified at the  surgical resection border, appearing stable in appearance. Grossly stable postsurgical resection changes in the anterior right frontal lobe.    05/23/2023 MRI brain with and without contrast:  Post resection of the right anterior frontal lobe mass with no acute intracranial pathology and no significant interval change at the resection cavity compared to the most recent MRI from March and February 2023.  Left axillary more advanced than bilateral anterior ethmoid chronic sinusitis with no paranasal fluid.    03/20/2023 MRI brain with and without contrast:  Post resection of anterior right frontal lobe mass with no acute intracranial pathology and no significant interval change compared to the most recent prior MRI brain dated 02/02/2023.    02/02/23 MRI Brain w/wo contrast: Findings consistent with large surgical resection defect in the anterior right frontal lobe. Interval evolution of the hemorrhage or proteinaceous debris within the resection cavity. Small focus of nodular enhancement at the dorsal surface of the resection.    09/21/2022 MRI brain with and without contrast:  Findings consistent with recent craniotomy in the right frontal region for resection of a hemorrhagic mass with some persistent enhancement seen along with the mesial margin of the surgical bed suggesting some residual mass.  Fluid seen in the surgical bed with subdural hematoma seen on the right side as well as with air seen on craniotomy site.  Midline shift from right to left, intraventricular hemorrhage seen in the right lateral ventricle.    Laboratory    07/30/23: Cr 1.02, alb 4.2, ca 9.6, wbc 3.11, hgb 12.7, plt 84, ANC 1.5  06/27/23: Creat 0.97, abl 4.5, lft's wnl, Bili 1.4, WBC 3.11, Hgb 13.6, MCV 89.6, , ANC 1.42  05/09/2023:  Creatinine 1.38, albumin 4.3, calcium 9.7, LFTs within normal limits, WBC count 1.9, hemoglobin 13.1, MCV 87.9, platelet count 128, ANC 0.92.  02/13/23: Cr 1.30, alb 4.3, ca 9.9, LFTs WNL, TB 1.3, mag  1.9, K+ 3.5, phosphorus 3.7, folic acid 4.89, B12 247, wbc 3.23, hgb 11.9, plt 129, ANC 2.10  01/15/2023:  Creatinine 1.20, albumin 4.6, calcium 10.0, alkaline phosphatase 58, total bili 2.2, AST 19, ALT 15, potassium 3.1, WBC count 3.69, hemoglobin 13.9, platelet 251, ANC 1.89.  12/14/2022:  Creatinine 1.53, albumin 4.1, calcium 9.6, LFTs within normal limits, WBC count 3.9, hemoglobin 14.4, MCV 87, platelet count 358, ANC 2.01  11/30/22: cr 1.43, alb 4.0, ca 10, alkphos 66, LFTs WNL, wbc 5.05, hgb 14.9, plt 302, ANC 2.91  11/15/2022:  Creatinine 1.05, albumin 4.3, calcium 10.5, alk-phos 59, total bili 1.1, AST 30, ALT 54, WBC 8.6, hemoglobin 15.2, MCV 88.7, platelet count 493, ANC 5.21.  ALC 2.29.  11/06/2022:  Creatinine 1.04, albumin 4.2, calcium 10.5, alkaline phosphatase 69, total bili 1.2, AST 26, ALT 54, WBC count 7.95, hemoglobin 15.1, MCV 87.6, platelet count 523, ANC 5.16         Past Medical History:   Diagnosis Date    Brain cancer        Past Surgical History:   Procedure Laterality Date    CRANIOTOMY FOR EXCISION OF INTRACRANIAL TUMOR      VENTRICULOSTOMY Left 9/20/2022    Procedure: VENTRICULOSTOMY;  Surgeon: Duglas Fowler MD;  Location: [facility];  Service: Neurosurgery;  Laterality: Left;       Family History   Problem Relation Age of Onset    Hypertension Father        Social History     Socioeconomic History    Marital status: Unknown   Tobacco Use    Smoking status: Never    Smokeless tobacco: Never   Substance and Sexual Activity    Alcohol use: Never    Drug use: Never       Current Outpatient Medications   Medication Sig Dispense Refill    folic acid (FOLVITE) 1 MG tablet Take 1 tablet (1 mg total) by mouth once daily. 30 tablet 4    levETIRAcetam (KEPPRA) 500 MG Tab Take 1 tablet (500 mg total) by mouth 2 (two) times daily. 60 tablet 11    ondansetron (ZOFRAN) 8 MG tablet Take 1 tablet (8 mg total) by mouth every 8 (eight) hours as needed for Nausea. 30 tablet 1    potassium chloride SA  (K-DUR,KLOR-CON) 20 MEQ tablet Take 1 tablet (20 mEq total) by mouth once daily. 30 tablet 3    sulfamethoxazole-trimethoprim 800-160mg (BACTRIM DS) 800-160 mg Tab Take 1 tablet by mouth every Mon, Wed, Fri. 20 tablet 0    temozolomide (TEMODAR) 20 MG capsule TAKE TWO 20MG CAPSULES BY MOUTH DAILY WITH ONE 250MG TEMOZOLOMIDE TO EQUAL 290MG TOTAL FOR 5 DAYS OF 28 DAY CYCLE 10 capsule 0    temozolomide (TEMODAR) 250 MG capsule TAKE ONE 250MG CAPSULE BY MOUTH DAILY WITH TWO 20MG TEMOZOLOMIDE CAPSULES FOR A TOTAL OF 290MG DAILY FOR 5 DAYS OF 28 DAY CYCLE 5 capsule 0     No current facility-administered medications for this visit.       Review of patient's allergies indicates:  No Known Allergies    Review of system  CONSTITUTIONAL: no fevers, no chills, no weight loss, no fatigue, no weakness  HEMATOLOGIC: no abnormal bleeding, no abnormal bruising, no drenching night sweats  ONCOLOGIC: no new masses or lumps  HEENT: no vision loss, no tinnitus or hearing loss, no nose bleeding, no dysphagia, no odynophagia  CVS: no chest pain, no palpitations, no dyspnea on exertion  RESP: no shortness of breath, no hemoptysis, no cough  GI: no nausea, no vomiting, no diarrhea, no constipation, no melena, no hematochezia, no hematemesis, no abdominal pain, no increase in abdominal girth  : no dysuria, no hematuria, no hesitancy, no scrotal swelling, no discharge  INTEGUMENT: no rashes, no abnormal bruising, no nail pitting, no hyperpigmentation  NEURO: no falls, no memory loss, no paresthesias or dysesthesias, no urofecal incontinence or retention, no loss of strength on any extremity  MSK: no back pain, no new joint pain, no joint swelling  PSYCH: no suicidal or homicidal ideation, no depression, no insomnia, no anhedonia  ENDOCRINE: no heat or cold intolerance, no polyuria, no polydipsia      Physical Exam:  Blood pressure 133/81, pulse 63, temperature 97.5 °F (36.4 °C), temperature source Oral, resp. rate 20, height 6' (1.829 m),  weight 63.3 kg (139 lb 9.6 oz), SpO2 100 %.   ECOG PS 0  GA: AAOx3, NAD  HEENT: NCAT, PERRLA, EOMI, good dentition, moist oral mucous membranes, well-healed incision over the right frontal region  LYMPH: no cervical, axillary or supraclavicular adenopathy  CVS: s1s2 RRR, no M/R/G  RESP: CTA b/l, no crackles, no wheezes or rhonchi  ABD: soft, NT, ND, BS+, no hepatosplenomegaly  EXT: no deformities, no pedal edema  SKIN: no rashes, warm and dry  NEURO: normal mentation, strength 5/5 on all 4 extremities, no sensory deficits, cranial nerves 2-12 grossly intact      Assessment and plan    # Astrocytoma, WHO grade 4, IDH1 mutated,  ATRX mutated, TP53 mutated MGMT methylation present.   Status post gross total resection on 09/20/2022   Reviewed pathology report, noted grade 4 astrocytoma, IDH1 mutated, ATRX mutated and MGMT methylation present  Discussed with patient and his father the diagnosis, aggressive nature of disease, poor prognosis and treatment recommendations including adjuvant chemoradiation followed by chemotherapy with Temodar.    Discussed the option to explore clinical trials given patient's young age and good ECOG.  Prescription for Temodar 75 mg per m2 days 1-7 concurrently with radiation sent on his initial visit  Patient started concurrent chemo RT on 11/02/2022.  Noted updated pathology report with neuro are expanded panel and integrated diagnosis of astrocytoma, grade 4, IDH mutant.  Will plan for adjuvant temozolomide for 12 months following completion of concurrent chemo RT.         Plan   Reviewed labs, Plts <100,000. Plan to hold Temodar .  Repeat labs 1 week  Telephone visit 1 week w/ plans to resume Temodar if Plts>100,000  Request OV note Dr. Anthony

## 2023-08-02 ENCOUNTER — TELEPHONE (OUTPATIENT)
Dept: NEUROSURGERY | Facility: CLINIC | Age: 33
End: 2023-08-02
Payer: COMMERCIAL

## 2023-08-02 NOTE — TELEPHONE ENCOUNTER
LVM to patient to r/s appt with Dr. Fowler from 09/20 to another date due to him being out of the clinic.

## 2023-08-04 NOTE — PROGRESS NOTES
Established Patient - Audio Only Telehealth Visit     The patient location is: Home  The chief complaint leading to consultation is: Astrocytoma  Visit type: Virtual visit with audio only (telephone)  Total time spent with patient: 12 minutes       The reason for the audio only service rather than synchronous audio and video virtual visit was related to technical difficulties or patient preference/necessity.     Each patient to whom I provide medical services by telemedicine is:  (1) informed of the relationship between the physician and patient and the respective role of any other health care provider with respect to management of the patient; and (2) notified that they may decline to receive medical services by telemedicine and may withdraw from such care at any time. Patient verbally consented to receive this service via voice-only telephone call.    Referring provider: Dr. Sauceda  Reason for consult:  High-grade glioma     Diagnosis   Astrocytoma, WHO grade 4, IDH1 mutated,  ATRX mutated, TP53 mutated MGMT methylation present.       Current treatment    1/17/23- current: Temozolomide 150 mg/m2 day 1-5 q 28 days    Treatment history     11/02/2022-12/15/2022:  Concurrent chemo RT with temozolomide 75 mg per m2 days daily      Patient is a 32-year-old with no past medical history who presented to the ER at [facility] on 09/20/2022 with symptoms of nausea, vomiting and headaches.  He had a CT of his head done in the ER which showed a right frontal lobe mass concerning for malignancy.  He was transferred to [facility] for neurosurgical consultation.  He underwent a right frontoparietal craniotomy with near total resection of the frontal lobe mass, pathology from which revealed high-grade glioma pending further classification.  Patient was discharged from hospital soon after his resection without any complications.    He presented to clinic on 10/05/2022 to establish care to discuss further disease management.       ECOG 0, worked in [employer] prior to his presentation, lives with his father, denies any family history of malignancy.  Former smoker, quit for many years, denies alcohol or recreational drug use.    Interval history     Today, 08/07/23, Patient via telephone for follow-up. patient denies any acute concerns since last follow-up visit with us.  He reports tolerating his last cycle of chemotherapy with Temodar well without any significant side effects.  He reports taking antinausea medications 30 minutes before his treatment which helps with nausea.      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    Imaging     07/25/2023 MRI brain w/w/o contrast: A large surgical resection defect is again seen at the anterior right frontal lobe, with surrounding gliosis.  Relatively thin rim enhancement is identified at the surgical resection border, appearing stable in appearance. Grossly stable postsurgical resection changes in the anterior right frontal lobe.    05/23/2023 MRI brain with and without contrast:  Post resection of the right anterior frontal lobe mass with no acute intracranial pathology and no significant interval change at the resection cavity compared to the most recent MRI from March and February 2023.  Left axillary more advanced than bilateral anterior ethmoid chronic sinusitis with no paranasal fluid.    03/20/2023 MRI brain with and without contrast:  Post resection of anterior right frontal lobe mass with no acute intracranial pathology and no significant interval change compared to the most recent prior MRI brain dated 02/02/2023.    02/02/23 MRI Brain w/wo contrast: Findings consistent with large surgical resection defect in the anterior right frontal lobe. Interval evolution of the hemorrhage or proteinaceous debris within the resection cavity. Small focus of nodular enhancement at the dorsal surface of the  resection.    09/21/2022 MRI brain with and without contrast:  Findings consistent with recent craniotomy in the right frontal region for resection of a hemorrhagic mass with some persistent enhancement seen along with the mesial margin of the surgical bed suggesting some residual mass.  Fluid seen in the surgical bed with subdural hematoma seen on the right side as well as with air seen on craniotomy site.  Midline shift from right to left, intraventricular hemorrhage seen in the right lateral ventricle.    Laboratory  08/06/23: Cr 0.92, alb 4.3, ca 10.0, wbc 3.55, hgb 13.2, plt 332, ANC 1.81  07/30/23: Cr 1.02, alb 4.2, ca 9.6, wbc 3.11, hgb 12.7, plt 84, ANC 1.5  06/27/23: Creat 0.97, abl 4.5, lft's wnl, Bili 1.4, WBC 3.11, Hgb 13.6, MCV 89.6, , ANC 1.42  05/09/2023:  Creatinine 1.38, albumin 4.3, calcium 9.7, LFTs within normal limits, WBC count 1.9, hemoglobin 13.1, MCV 87.9, platelet count 128, ANC 0.92.  02/13/23: Cr 1.30, alb 4.3, ca 9.9, LFTs WNL, TB 1.3, mag 1.9, K+ 3.5, phosphorus 3.7, folic acid 4.89, B12 247, wbc 3.23, hgb 11.9, plt 129, ANC 2.10  01/15/2023:  Creatinine 1.20, albumin 4.6, calcium 10.0, alkaline phosphatase 58, total bili 2.2, AST 19, ALT 15, potassium 3.1, WBC count 3.69, hemoglobin 13.9, platelet 251, ANC 1.89.  12/14/2022:  Creatinine 1.53, albumin 4.1, calcium 9.6, LFTs within normal limits, WBC count 3.9, hemoglobin 14.4, MCV 87, platelet count 358, ANC 2.01  11/30/22: cr 1.43, alb 4.0, ca 10, alkphos 66, LFTs WNL, wbc 5.05, hgb 14.9, plt 302, ANC 2.91  11/15/2022:  Creatinine 1.05, albumin 4.3, calcium 10.5, alk-phos 59, total bili 1.1, AST 30, ALT 54, WBC 8.6, hemoglobin 15.2, MCV 88.7, platelet count 493, ANC 5.21.  ALC 2.29.  11/06/2022:  Creatinine 1.04, albumin 4.2, calcium 10.5, alkaline phosphatase 69, total bili 1.2, AST 26, ALT 54, WBC count 7.95, hemoglobin 15.1, MCV 87.6, platelet count 523, ANC 5.16         Past Medical History:   Diagnosis Date    Brain cancer         Past Surgical History:   Procedure Laterality Date    CRANIOTOMY FOR EXCISION OF INTRACRANIAL TUMOR      VENTRICULOSTOMY Left 9/20/2022    Procedure: VENTRICULOSTOMY;  Surgeon: Duglas Fowler MD;  Location: [facility];  Service: Neurosurgery;  Laterality: Left;       Family History   Problem Relation Age of Onset    Hypertension Father        Social History     Socioeconomic History    Marital status: Unknown   Tobacco Use    Smoking status: Never    Smokeless tobacco: Never   Substance and Sexual Activity    Alcohol use: Never    Drug use: Never       Current Outpatient Medications   Medication Sig Dispense Refill    folic acid (FOLVITE) 1 MG tablet Take 1 tablet (1 mg total) by mouth once daily. 30 tablet 4    levETIRAcetam (KEPPRA) 500 MG Tab Take 1 tablet (500 mg total) by mouth 2 (two) times daily. 60 tablet 11    ondansetron (ZOFRAN) 8 MG tablet Take 1 tablet (8 mg total) by mouth every 8 (eight) hours as needed for Nausea. 30 tablet 1    potassium chloride SA (K-DUR,KLOR-CON) 20 MEQ tablet Take 1 tablet (20 mEq total) by mouth once daily. 30 tablet 3    sulfamethoxazole-trimethoprim 800-160mg (BACTRIM DS) 800-160 mg Tab Take 1 tablet by mouth every Mon, Wed, Fri. 20 tablet 0    temozolomide (TEMODAR) 20 MG capsule TAKE TWO 20MG CAPSULES BY MOUTH DAILY WITH ONE 250MG TEMOZOLOMIDE TO EQUAL 290MG TOTAL FOR 5 DAYS OF 28 DAY CYCLE 10 capsule 0    temozolomide (TEMODAR) 250 MG capsule TAKE ONE 250MG CAPSULE BY MOUTH DAILY WITH TWO 20MG TEMOZOLOMIDE CAPSULES FOR A TOTAL OF 290MG DAILY FOR 5 DAYS OF 28 DAY CYCLE 5 capsule 0     No current facility-administered medications for this visit.       Review of patient's allergies indicates:  No Known Allergies    Review of system  CONSTITUTIONAL: no fevers, no chills, no weight loss, no fatigue, no weakness  HEMATOLOGIC: no abnormal bleeding, no abnormal bruising, no drenching night sweats  ONCOLOGIC: no new masses or lumps  HEENT: no vision loss, no tinnitus or hearing  loss, no nose bleeding, no dysphagia, no odynophagia  CVS: no chest pain, no palpitations, no dyspnea on exertion  RESP: no shortness of breath, no hemoptysis, no cough  GI: no nausea, no vomiting, no diarrhea, no constipation, no melena, no hematochezia, no hematemesis, no abdominal pain, no increase in abdominal girth  : no dysuria, no hematuria, no hesitancy, no scrotal swelling, no discharge  INTEGUMENT: no rashes, no abnormal bruising, no nail pitting, no hyperpigmentation  NEURO: no falls, no memory loss, no paresthesias or dysesthesias, no urofecal incontinence or retention, no loss of strength on any extremity  MSK: no back pain, no new joint pain, no joint swelling  PSYCH: no suicidal or homicidal ideation, no depression, no insomnia, no anhedonia  ENDOCRINE: no heat or cold intolerance, no polyuria, no polydipsia      Physical Exam:  Height 6' (1.829 m), weight 63.5 kg (139 lb 14.4 oz).   ECOG PS 0  GA: AAOx3, NAD  HEENT: NCAT, PERRLA, EOMI, good dentition, moist oral mucous membranes, well-healed incision over the right frontal region  LYMPH: no cervical, axillary or supraclavicular adenopathy  CVS: s1s2 RRR, no M/R/G  RESP: CTA b/l, no crackles, no wheezes or rhonchi  ABD: soft, NT, ND, BS+, no hepatosplenomegaly  EXT: no deformities, no pedal edema  SKIN: no rashes, warm and dry  NEURO: normal mentation, strength 5/5 on all 4 extremities, no sensory deficits, cranial nerves 2-12 grossly intact      Assessment and plan    # Astrocytoma, WHO grade 4, IDH1 mutated,  ATRX mutated, TP53 mutated MGMT methylation present.   Status post gross total resection on 09/20/2022   Reviewed pathology report, noted grade 4 astrocytoma, IDH1 mutated, ATRX mutated and MGMT methylation present  Discussed with patient and his father the diagnosis, aggressive nature of disease, poor prognosis and treatment recommendations including adjuvant chemoradiation followed by chemotherapy with Temodar.    Discussed the option to  explore clinical trials given patient's young age and good ECOG.  Prescription for Temodar 75 mg per m2 days 1-7 concurrently with radiation sent on his initial visit  Patient started concurrent chemo RT on 11/02/2022.  Noted updated pathology report with neuro are expanded panel and integrated diagnosis of astrocytoma, grade 4, IDH mutant.  Will plan for adjuvant temozolomide for 12 months following completion of concurrent chemo RT.         Plan   Reviewed labs, Plts now > 100,000 at 332,000. Ok to resume cycle 8 of 290 mg temozolomide daily on days 1-5 Q 28 days today.  RTC 4 weeks w/ CBC, CMP, Mg, phosphorus                                               This service was not originating from a related E/M service provided within the previous 7 days nor will  to an E/M service or procedure within the next 24 hours or my soonest available appointment.  Prevailing standard of care was able to be met in this audio-only visit.

## 2023-08-07 ENCOUNTER — OFFICE VISIT (OUTPATIENT)
Dept: HEMATOLOGY/ONCOLOGY | Facility: CLINIC | Age: 33
End: 2023-08-07
Payer: COMMERCIAL

## 2023-08-07 VITALS — HEIGHT: 72 IN | BODY MASS INDEX: 18.95 KG/M2 | WEIGHT: 139.88 LBS

## 2023-08-07 DIAGNOSIS — C71.9 GRADE IV ASTROCYTOMA: Primary | ICD-10-CM

## 2023-08-07 PROCEDURE — 3008F BODY MASS INDEX DOCD: CPT | Mod: CPTII,95,,

## 2023-08-07 PROCEDURE — 1159F MED LIST DOCD IN RCRD: CPT | Mod: CPTII,95,,

## 2023-08-07 PROCEDURE — 99215 OFFICE O/P EST HI 40 MIN: CPT | Mod: 95,,,

## 2023-08-07 PROCEDURE — 99215 PR OFFICE/OUTPT VISIT, EST, LEVL V, 40-54 MIN: ICD-10-PCS | Mod: 95,,,

## 2023-08-07 PROCEDURE — 3008F PR BODY MASS INDEX (BMI) DOCUMENTED: ICD-10-PCS | Mod: CPTII,95,,

## 2023-08-07 PROCEDURE — 1159F PR MEDICATION LIST DOCUMENTED IN MEDICAL RECORD: ICD-10-PCS | Mod: CPTII,95,,

## 2023-08-17 DIAGNOSIS — C71.9 GRADE IV ASTROCYTOMA: ICD-10-CM

## 2023-08-17 RX ORDER — TEMOZOLOMIDE 250 MG/1
CAPSULE ORAL
Qty: 5 CAPSULE | Refills: 0 | Status: SHIPPED | OUTPATIENT
Start: 2023-08-17 | End: 2023-10-02

## 2023-08-17 RX ORDER — TEMOZOLOMIDE 20 MG/1
CAPSULE ORAL
Qty: 10 CAPSULE | Refills: 0 | Status: SHIPPED | OUTPATIENT
Start: 2023-08-17 | End: 2023-10-02 | Stop reason: SDUPTHER

## 2023-08-29 NOTE — PROGRESS NOTES
Referring provider: Dr. Sauceda  Reason for consult:  High-grade glioma     Diagnosis   Astrocytoma, WHO grade 4, IDH1 mutated,  ATRX mutated, TP53 mutated MGMT methylation present.       Current treatment    1/17/23- current: Temozolomide 150 mg/m2 day 1-5 q 28 days    Treatment history     11/02/2022-12/15/2022:  Concurrent chemo RT with temozolomide 75 mg per m2 days daily      Patient is a 32-year-old with no past medical history who presented to the ER at [facility] on 09/20/2022 with symptoms of nausea, vomiting and headaches.  He had a CT of his head done in the ER which showed a right frontal lobe mass concerning for malignancy.  He was transferred to [facility] for neurosurgical consultation.  He underwent a right frontoparietal craniotomy with near total resection of the frontal lobe mass, pathology from which revealed high-grade glioma pending further classification.  Patient was discharged from hospital soon after his resection without any complications.    He presented to clinic on 10/05/2022 to establish care to discuss further disease management.      ECOG 0, worked in [employer] prior to his presentation, lives with his father, denies any family history of malignancy.  Former smoker, quit for many years, denies alcohol or recreational drug use.    Interval history     Today, 09/06/2023, patient denies any acute concerns today.  He denies any headaches, vision changes or focal weakness.  He reports tolerance and compliance with Temodar.  He denies any nausea with it.  He denies any easy bruising, bleeding, fevers or chills.      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    Imaging     07/25/2023 MRI brain w/w/o contrast: A large surgical resection defect is again seen at the anterior right frontal lobe, with surrounding gliosis.  Relatively thin rim enhancement is identified at the surgical resection border,  appearing stable in appearance. Grossly stable postsurgical resection changes in the anterior right frontal lobe.    05/23/2023 MRI brain with and without contrast:  Post resection of the right anterior frontal lobe mass with no acute intracranial pathology and no significant interval change at the resection cavity compared to the most recent MRI from March and February 2023.  Left axillary more advanced than bilateral anterior ethmoid chronic sinusitis with no paranasal fluid.    03/20/2023 MRI brain with and without contrast:  Post resection of anterior right frontal lobe mass with no acute intracranial pathology and no significant interval change compared to the most recent prior MRI brain dated 02/02/2023.    02/02/23 MRI Brain w/wo contrast: Findings consistent with large surgical resection defect in the anterior right frontal lobe. Interval evolution of the hemorrhage or proteinaceous debris within the resection cavity. Small focus of nodular enhancement at the dorsal surface of the resection.    09/21/2022 MRI brain with and without contrast:  Findings consistent with recent craniotomy in the right frontal region for resection of a hemorrhagic mass with some persistent enhancement seen along with the mesial margin of the surgical bed suggesting some residual mass.  Fluid seen in the surgical bed with subdural hematoma seen on the right side as well as with air seen on craniotomy site.  Midline shift from right to left, intraventricular hemorrhage seen in the right lateral ventricle.    Laboratory  09/05/2023 CMP unremarkable, WBC count 3.63, hemoglobin 13.3, platelet count 172, ANC 1.89.  07/30/23: Cr 1.02, alb 4.2, ca 9.6, wbc 3.11, hgb 12.7, plt 84, ANC 1.5  06/27/23: Creat 0.97, abl 4.5, lft's wnl, Bili 1.4, WBC 3.11, Hgb 13.6, MCV 89.6, , ANC 1.42  05/09/2023:  Creatinine 1.38, albumin 4.3, calcium 9.7, LFTs within normal limits, WBC count 1.9, hemoglobin 13.1, MCV 87.9, platelet count 128, ANC  0.92.  02/13/23: Cr 1.30, alb 4.3, ca 9.9, LFTs WNL, TB 1.3, mag 1.9, K+ 3.5, phosphorus 3.7, folic acid 4.89, B12 247, wbc 3.23, hgb 11.9, plt 129, ANC 2.10  01/15/2023:  Creatinine 1.20, albumin 4.6, calcium 10.0, alkaline phosphatase 58, total bili 2.2, AST 19, ALT 15, potassium 3.1, WBC count 3.69, hemoglobin 13.9, platelet 251, ANC 1.89.  12/14/2022:  Creatinine 1.53, albumin 4.1, calcium 9.6, LFTs within normal limits, WBC count 3.9, hemoglobin 14.4, MCV 87, platelet count 358, ANC 2.01  11/30/22: cr 1.43, alb 4.0, ca 10, alkphos 66, LFTs WNL, wbc 5.05, hgb 14.9, plt 302, ANC 2.91  11/15/2022:  Creatinine 1.05, albumin 4.3, calcium 10.5, alk-phos 59, total bili 1.1, AST 30, ALT 54, WBC 8.6, hemoglobin 15.2, MCV 88.7, platelet count 493, ANC 5.21.  ALC 2.29.  11/06/2022:  Creatinine 1.04, albumin 4.2, calcium 10.5, alkaline phosphatase 69, total bili 1.2, AST 26, ALT 54, WBC count 7.95, hemoglobin 15.1, MCV 87.6, platelet count 523, ANC 5.16         Past Medical History:   Diagnosis Date    Brain cancer        Past Surgical History:   Procedure Laterality Date    CRANIOTOMY FOR EXCISION OF INTRACRANIAL TUMOR      VENTRICULOSTOMY Left 9/20/2022    Procedure: VENTRICULOSTOMY;  Surgeon: Duglas Fowler MD;  Location: [facility];  Service: Neurosurgery;  Laterality: Left;       Family History   Problem Relation Age of Onset    Hypertension Father        Social History     Socioeconomic History    Marital status: Unknown   Tobacco Use    Smoking status: Never    Smokeless tobacco: Never   Substance and Sexual Activity    Alcohol use: Never    Drug use: Never       Current Outpatient Medications   Medication Sig Dispense Refill    folic acid (FOLVITE) 1 MG tablet Take 1 tablet (1 mg total) by mouth once daily. 30 tablet 4    levETIRAcetam (KEPPRA) 500 MG Tab Take 1 tablet (500 mg total) by mouth 2 (two) times daily. 60 tablet 11    ondansetron (ZOFRAN) 8 MG tablet Take 1 tablet (8 mg total) by mouth every 8 (eight) hours  as needed for Nausea. 30 tablet 1    potassium chloride SA (K-DUR,KLOR-CON) 20 MEQ tablet Take 1 tablet (20 mEq total) by mouth once daily. 30 tablet 3    sulfamethoxazole-trimethoprim 800-160mg (BACTRIM DS) 800-160 mg Tab Take 1 tablet by mouth every Mon, Wed, Fri. 20 tablet 0    temozolomide (TEMODAR) 20 MG capsule TAKE TWO 20MG CAPSULES BY MOUTH DAILY WITH ONE 250MG TEMOZOLOMIDE TO EQUAL 290MG TOTAL FOR 5 DAYS OF 28 DAY CYCLE 10 capsule 0    temozolomide (TEMODAR) 250 MG capsule TAKE ONE 250MG CAPSULE BY MOUTH DAILY WITH TWO 20MG TEMOZOLOMIDE CAPSULES FOR A TOTAL OF 290MG DAILY FOR 5 DAYS OF 28 DAY CYCLE 5 capsule 0     No current facility-administered medications for this visit.       Review of patient's allergies indicates:  No Known Allergies    Review of system  CONSTITUTIONAL: no fevers, no chills, no weight loss, no fatigue, no weakness  HEMATOLOGIC: no abnormal bleeding, no abnormal bruising, no drenching night sweats  ONCOLOGIC: no new masses or lumps  HEENT: no vision loss, no tinnitus or hearing loss, no nose bleeding, no dysphagia, no odynophagia  CVS: no chest pain, no palpitations, no dyspnea on exertion  RESP: no shortness of breath, no hemoptysis, no cough  GI: no nausea, no vomiting, no diarrhea, no constipation, no melena, no hematochezia, no hematemesis, no abdominal pain, no increase in abdominal girth  : no dysuria, no hematuria, no hesitancy, no scrotal swelling, no discharge  INTEGUMENT: no rashes, no abnormal bruising, no nail pitting, no hyperpigmentation  NEURO: no falls, no memory loss, no paresthesias or dysesthesias, no urofecal incontinence or retention, no loss of strength on any extremity  MSK: no back pain, no new joint pain, no joint swelling  PSYCH: no suicidal or homicidal ideation, no depression, no insomnia, no anhedonia  ENDOCRINE: no heat or cold intolerance, no polyuria, no polydipsia      Physical Exam:  Blood pressure (!) 160/91, pulse (!) 57, temperature 98.4 °F (36.9  °C), temperature source Oral, resp. rate 18, height 6' (1.829 m), weight 63.8 kg (140 lb 9.6 oz), SpO2 96 %.   ECOG PS 0  GA: AAOx3, NAD  HEENT: NCAT, PERRLA, EOMI, good dentition, moist oral mucous membranes, well-healed incision over the right frontal region  LYMPH: no cervical, axillary or supraclavicular adenopathy  CVS: s1s2 RRR, no M/R/G  RESP: CTA b/l, no crackles, no wheezes or rhonchi  ABD: soft, NT, ND, BS+, no hepatosplenomegaly  EXT: no deformities, no pedal edema  SKIN: no rashes, warm and dry  NEURO: normal mentation, strength 5/5 on all 4 extremities, no sensory deficits, cranial nerves 2-12 grossly intact      Assessment and plan    # Astrocytoma, WHO grade 4, IDH1 mutated,  ATRX mutated, TP53 mutated MGMT methylation present.   Status post gross total resection on 09/20/2022   Reviewed pathology report, noted grade 4 astrocytoma, IDH1 mutated, ATRX mutated and MGMT methylation present  Discussed with patient and his father the diagnosis, aggressive nature of disease, poor prognosis and treatment recommendations including adjuvant chemoradiation followed by chemotherapy with Temodar.    Discussed the option to explore clinical trials given patient's young age and good ECOG.  Prescription for Temodar 75 mg per m2 days 1-7 concurrently with radiation sent on his initial visit  Patient started concurrent chemo RT on 11/02/2022.  Noted updated pathology report with neuro are expanded panel and integrated diagnosis of astrocytoma, grade 4, IDH mutant.  Will plan for adjuvant temozolomide for 12 months following completion of concurrent chemo RT.         Plan   Reviewed labs, proceed with next cycle of Temodar today   Follow-up in 4 weeks, labs the day prior   Patient was scheduled for follow-up with Radiation Oncology with a repeat MRI brain later this month.      Portions of the record may have been created with voice recognition software. Occasional wrong-word or sound-a-like substitutions may have  occurred due to the inherent limitations of voice recognition software. Read the chart carefully and recognize, using context, where substitutions have occurred.

## 2023-09-06 ENCOUNTER — OFFICE VISIT (OUTPATIENT)
Dept: HEMATOLOGY/ONCOLOGY | Facility: CLINIC | Age: 33
End: 2023-09-06
Payer: COMMERCIAL

## 2023-09-06 VITALS
RESPIRATION RATE: 18 BRPM | DIASTOLIC BLOOD PRESSURE: 91 MMHG | TEMPERATURE: 98 F | WEIGHT: 140.63 LBS | HEIGHT: 72 IN | HEART RATE: 57 BPM | BODY MASS INDEX: 19.05 KG/M2 | OXYGEN SATURATION: 96 % | SYSTOLIC BLOOD PRESSURE: 160 MMHG

## 2023-09-06 DIAGNOSIS — C71.9 GRADE IV ASTROCYTOMA: ICD-10-CM

## 2023-09-06 DIAGNOSIS — Z51.11 ENCOUNTER FOR ANTINEOPLASTIC CHEMOTHERAPY: Primary | ICD-10-CM

## 2023-09-06 PROCEDURE — 99214 OFFICE O/P EST MOD 30 MIN: CPT | Mod: ,,, | Performed by: STUDENT IN AN ORGANIZED HEALTH CARE EDUCATION/TRAINING PROGRAM

## 2023-09-06 PROCEDURE — 3077F PR MOST RECENT SYSTOLIC BLOOD PRESSURE >= 140 MM HG: ICD-10-PCS | Mod: CPTII,,, | Performed by: STUDENT IN AN ORGANIZED HEALTH CARE EDUCATION/TRAINING PROGRAM

## 2023-09-06 PROCEDURE — 1159F MED LIST DOCD IN RCRD: CPT | Mod: CPTII,,, | Performed by: STUDENT IN AN ORGANIZED HEALTH CARE EDUCATION/TRAINING PROGRAM

## 2023-09-06 PROCEDURE — 3080F DIAST BP >= 90 MM HG: CPT | Mod: CPTII,,, | Performed by: STUDENT IN AN ORGANIZED HEALTH CARE EDUCATION/TRAINING PROGRAM

## 2023-09-06 PROCEDURE — 1159F PR MEDICATION LIST DOCUMENTED IN MEDICAL RECORD: ICD-10-PCS | Mod: CPTII,,, | Performed by: STUDENT IN AN ORGANIZED HEALTH CARE EDUCATION/TRAINING PROGRAM

## 2023-09-06 PROCEDURE — 3008F PR BODY MASS INDEX (BMI) DOCUMENTED: ICD-10-PCS | Mod: CPTII,,, | Performed by: STUDENT IN AN ORGANIZED HEALTH CARE EDUCATION/TRAINING PROGRAM

## 2023-09-06 PROCEDURE — 99214 PR OFFICE/OUTPT VISIT, EST, LEVL IV, 30-39 MIN: ICD-10-PCS | Mod: ,,, | Performed by: STUDENT IN AN ORGANIZED HEALTH CARE EDUCATION/TRAINING PROGRAM

## 2023-09-06 PROCEDURE — 3008F BODY MASS INDEX DOCD: CPT | Mod: CPTII,,, | Performed by: STUDENT IN AN ORGANIZED HEALTH CARE EDUCATION/TRAINING PROGRAM

## 2023-09-06 PROCEDURE — 3077F SYST BP >= 140 MM HG: CPT | Mod: CPTII,,, | Performed by: STUDENT IN AN ORGANIZED HEALTH CARE EDUCATION/TRAINING PROGRAM

## 2023-09-06 PROCEDURE — 3080F PR MOST RECENT DIASTOLIC BLOOD PRESSURE >= 90 MM HG: ICD-10-PCS | Mod: CPTII,,, | Performed by: STUDENT IN AN ORGANIZED HEALTH CARE EDUCATION/TRAINING PROGRAM

## 2023-09-25 ENCOUNTER — OFFICE VISIT (OUTPATIENT)
Dept: NEUROSURGERY | Facility: CLINIC | Age: 33
End: 2023-09-25
Payer: COMMERCIAL

## 2023-09-25 VITALS — WEIGHT: 144 LBS | HEIGHT: 72 IN | BODY MASS INDEX: 19.5 KG/M2

## 2023-09-25 DIAGNOSIS — C71.9 GBM (GLIOBLASTOMA MULTIFORME): Primary | ICD-10-CM

## 2023-09-25 PROCEDURE — 1159F MED LIST DOCD IN RCRD: CPT | Mod: CPTII,95,, | Performed by: NEUROLOGICAL SURGERY

## 2023-09-25 PROCEDURE — 1160F RVW MEDS BY RX/DR IN RCRD: CPT | Mod: CPTII,95,, | Performed by: NEUROLOGICAL SURGERY

## 2023-09-25 PROCEDURE — 99213 OFFICE O/P EST LOW 20 MIN: CPT | Mod: 95,,, | Performed by: NEUROLOGICAL SURGERY

## 2023-09-25 PROCEDURE — 3008F BODY MASS INDEX DOCD: CPT | Mod: CPTII,95,, | Performed by: NEUROLOGICAL SURGERY

## 2023-09-25 NOTE — PROGRESS NOTES
[facility]        Michael Anthony   [id number]   1990       SUBJECTIVE:     This is a telemedicine note.  The patient was treated using telemedicine with real-time audio and video, according to [facility] protocols.  I, a distant provider, conducted the visit from my office in [place], [place].  The patient participated in the visit at their home in [place].  I am licensed in the state where the patient stated they are located.  The patient (or patient's representative) stated that they understood and accepted the privacy and security risks today information other location.     CHIEF COMPLAINT:    1 year post-op    HPI:    Michael Anthony is a 33 y.o.-year-old male who presents today for post-operative follow-up.  He is s/p  right frontoparietal craniotomy for resection of hemorrhagic tumor/glioblastoma that was done on 9/20/22.  He was treated with radiation and Temodar from 11/2/22 to 12/15/22.  He is under the care of Dr. Renee with oncology.  He continues treatment with Temodar.  He is to remain on this until sometime in January.  He reports to be doing well today.  He denies any new or worsening symptoms.  He remains on Keppra.  He has not had any seizures.        Review of patient's allergies indicates:  No Known Allergies  Current Outpatient Medications   Medication Sig Dispense Refill    folic acid (FOLVITE) 1 MG tablet Take 1 tablet (1 mg total) by mouth once daily. 30 tablet 4    levETIRAcetam (KEPPRA) 500 MG Tab Take 1 tablet (500 mg total) by mouth 2 (two) times daily. 60 tablet 11    ondansetron (ZOFRAN) 8 MG tablet Take 1 tablet (8 mg total) by mouth every 8 (eight) hours as needed for Nausea. 30 tablet 1    potassium chloride SA (K-DUR,KLOR-CON) 20 MEQ tablet Take 1 tablet (20 mEq total) by mouth once daily. 30 tablet 3    temozolomide (TEMODAR) 20 MG capsule TAKE TWO 20MG CAPSULES BY MOUTH DAILY WITH ONE 250MG TEMOZOLOMIDE TO EQUAL 290MG TOTAL FOR 5 DAYS OF 28 DAY CYCLE 10  capsule 0    temozolomide (TEMODAR) 250 MG capsule TAKE ONE 250MG CAPSULE BY MOUTH DAILY WITH TWO 20MG TEMOZOLOMIDE CAPSULES FOR A TOTAL OF 290MG DAILY FOR 5 DAYS OF 28 DAY CYCLE 5 capsule 0     No current facility-administered medications for this visit.     Past Medical History:   Diagnosis Date    Astrocytoma     Glioblastoma multiforme      Past Surgical History:   Procedure Laterality Date    CRANIOTOMY FOR EXCISION OF INTRACRANIAL TUMOR      VENTRICULOSTOMY Left 9/20/2022    Procedure: VENTRICULOSTOMY;  Surgeon: Duglas Fowler MD;  Location: [facility];  Service: Neurosurgery;  Laterality: Left;     Family History       Problem Relation (Age of Onset)    Hypertension Father          Social History     Socioeconomic History    Marital status: Unknown   Tobacco Use    Smoking status: Never    Smokeless tobacco: Never   Substance and Sexual Activity    Alcohol use: Never    Drug use: Never       Review of systems:  Pertinent items are noted in HPI.      OBJECTIVE:     Vital Signs  Height: 6' (182.9 cm)  Weight: 65.3 kg (144 lb)  Body mass index is 19.53 kg/m².      Physical Exam:    General:  Pleasant. Well-nourished. Alert. No acute distress.    Head:  Normocephalic, without obvious abnormality, atraumatic    Lungs:   Breathing is quiet, non-lablored    Neurological:    Oriented to Person, Place, Time   Speech:  normal  Memory, cognition, and affect are appropriate.  Motor Strength: Moves all extremities spontaneously with good tone.  No abnormal movements seen.      Right frontoparietal scalp  incision:  Well healed    Gait:  normal    MRI of the brain from earlier today looks remarkably good with minimal marginal enhancement of the resection cavity.  There was also very little high signal at the posterior aspect of the cavity.    ASSESSMENT/PLAN:     1. GBM (glioblastoma multiforme)     -Okay to tonio/c Keppra  -Follow up after next MRI in about 2 months (December 2023)        IDr. Duglas, personally performed  the services described in this documentation. All medical record entries made by the scribe, Shante Davis RN, were at my direction and in my presence.  I have reviewed the chart and agree that the record reflects my personal performance and is accurate and complete. Duglas Fowler MD.  12:31 PM 09/25/2023           Duglas Fowler MD FACS FAANS

## 2023-09-26 DIAGNOSIS — E53.8 FOLIC ACID DEFICIENCY: ICD-10-CM

## 2023-09-26 DIAGNOSIS — C71.9 HIGH GRADE GLIOMA NOT CLASSIFIABLE BY WHO CRITERIA: ICD-10-CM

## 2023-09-26 RX ORDER — FOLIC ACID 1 MG/1
1 TABLET ORAL DAILY
Qty: 30 TABLET | Refills: 4 | Status: SHIPPED | OUTPATIENT
Start: 2023-09-26 | End: 2023-11-30 | Stop reason: SDUPTHER

## 2023-09-26 RX ORDER — ONDANSETRON HYDROCHLORIDE 8 MG/1
8 TABLET, FILM COATED ORAL EVERY 8 HOURS PRN
Qty: 30 TABLET | Refills: 1 | Status: SHIPPED | OUTPATIENT
Start: 2023-09-26

## 2023-10-02 DIAGNOSIS — C71.9 GRADE IV ASTROCYTOMA: ICD-10-CM

## 2023-10-02 RX ORDER — TEMOZOLOMIDE 20 MG/1
CAPSULE ORAL
Qty: 10 CAPSULE | Refills: 5 | Status: SHIPPED | OUTPATIENT
Start: 2023-10-02

## 2023-10-02 RX ORDER — TEMOZOLOMIDE 250 MG/1
CAPSULE ORAL
Qty: 5 CAPSULE | Refills: 5 | Status: SHIPPED | OUTPATIENT
Start: 2023-10-02

## 2023-10-04 ENCOUNTER — OFFICE VISIT (OUTPATIENT)
Dept: HEMATOLOGY/ONCOLOGY | Facility: CLINIC | Age: 33
End: 2023-10-04
Payer: COMMERCIAL

## 2023-10-04 VITALS
RESPIRATION RATE: 18 BRPM | DIASTOLIC BLOOD PRESSURE: 93 MMHG | TEMPERATURE: 98 F | OXYGEN SATURATION: 100 % | BODY MASS INDEX: 19.26 KG/M2 | HEIGHT: 72 IN | SYSTOLIC BLOOD PRESSURE: 159 MMHG | WEIGHT: 142.19 LBS | HEART RATE: 67 BPM

## 2023-10-04 DIAGNOSIS — Z51.11 ENCOUNTER FOR ANTINEOPLASTIC CHEMOTHERAPY: Primary | ICD-10-CM

## 2023-10-04 PROCEDURE — 3080F DIAST BP >= 90 MM HG: CPT | Mod: CPTII,,, | Performed by: STUDENT IN AN ORGANIZED HEALTH CARE EDUCATION/TRAINING PROGRAM

## 2023-10-04 PROCEDURE — 99215 PR OFFICE/OUTPT VISIT, EST, LEVL V, 40-54 MIN: ICD-10-PCS | Mod: ,,, | Performed by: STUDENT IN AN ORGANIZED HEALTH CARE EDUCATION/TRAINING PROGRAM

## 2023-10-04 PROCEDURE — 3077F SYST BP >= 140 MM HG: CPT | Mod: CPTII,,, | Performed by: STUDENT IN AN ORGANIZED HEALTH CARE EDUCATION/TRAINING PROGRAM

## 2023-10-04 PROCEDURE — 3080F PR MOST RECENT DIASTOLIC BLOOD PRESSURE >= 90 MM HG: ICD-10-PCS | Mod: CPTII,,, | Performed by: STUDENT IN AN ORGANIZED HEALTH CARE EDUCATION/TRAINING PROGRAM

## 2023-10-04 PROCEDURE — 1159F PR MEDICATION LIST DOCUMENTED IN MEDICAL RECORD: ICD-10-PCS | Mod: CPTII,,, | Performed by: STUDENT IN AN ORGANIZED HEALTH CARE EDUCATION/TRAINING PROGRAM

## 2023-10-04 PROCEDURE — 3008F PR BODY MASS INDEX (BMI) DOCUMENTED: ICD-10-PCS | Mod: CPTII,,, | Performed by: STUDENT IN AN ORGANIZED HEALTH CARE EDUCATION/TRAINING PROGRAM

## 2023-10-04 PROCEDURE — 3077F PR MOST RECENT SYSTOLIC BLOOD PRESSURE >= 140 MM HG: ICD-10-PCS | Mod: CPTII,,, | Performed by: STUDENT IN AN ORGANIZED HEALTH CARE EDUCATION/TRAINING PROGRAM

## 2023-10-04 PROCEDURE — 1159F MED LIST DOCD IN RCRD: CPT | Mod: CPTII,,, | Performed by: STUDENT IN AN ORGANIZED HEALTH CARE EDUCATION/TRAINING PROGRAM

## 2023-10-04 PROCEDURE — 99215 OFFICE O/P EST HI 40 MIN: CPT | Mod: ,,, | Performed by: STUDENT IN AN ORGANIZED HEALTH CARE EDUCATION/TRAINING PROGRAM

## 2023-10-04 PROCEDURE — 3008F BODY MASS INDEX DOCD: CPT | Mod: CPTII,,, | Performed by: STUDENT IN AN ORGANIZED HEALTH CARE EDUCATION/TRAINING PROGRAM

## 2023-10-04 NOTE — PROGRESS NOTES
Referring provider: Dr. Sauceda  Reason for consult:  High-grade glioma     Diagnosis   Astrocytoma, WHO grade 4, IDH1 mutated,  ATRX mutated, TP53 mutated MGMT methylation present.       Current treatment    1/17/23- current: Temozolomide 150 mg/m2 day 1-5 q 28 days    Treatment history     11/02/2022-12/15/2022:  Concurrent chemo RT with temozolomide 75 mg per m2 days daily      Patient is a 32-year-old with no past medical history who presented to the [facility] on 09/20/2022 with symptoms of nausea, vomiting and headaches.  He had a CT of his head done in the ER which showed a right frontal lobe mass concerning for malignancy.  He was transferred to [facility] for neurosurgical consultation.  He underwent a right frontoparietal craniotomy with near total resection of the frontal lobe mass, pathology from which revealed high-grade glioma pending further classification.  Patient was discharged from hospital soon after his resection without any complications.    He presented to clinic on 10/05/2022 to establish care to discuss further disease management.      ECOG 0, worked in [employer] prior to his presentation, lives with his father, denies any family history of malignancy.  Former smoker, quit for many years, denies alcohol or recreational drug use.    Interval history     Today, 10/04/2023, patient denies any acute concerns today.  He denies any headaches, vision changes or focal weakness.  He reports tolerance and compliance with Temodar.  He denies any nausea with it.  He denies any easy bruising, bleeding, fevers or chills.      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    Imaging     09/25/2023 MRI brain with and without contrast: Chronic findings as above which are similar to the most recent prior brain MRI 07/25/2023 with no new abnormality at the head/brain.       07/25/2023 MRI brain w/w/o contrast: A large  surgical resection defect is again seen at the anterior right frontal lobe, with surrounding gliosis.  Relatively thin rim enhancement is identified at the surgical resection border, appearing stable in appearance. Grossly stable postsurgical resection changes in the anterior right frontal lobe.    05/23/2023 MRI brain with and without contrast:  Post resection of the right anterior frontal lobe mass with no acute intracranial pathology and no significant interval change at the resection cavity compared to the most recent MRI from March and February 2023.  Left axillary more advanced than bilateral anterior ethmoid chronic sinusitis with no paranasal fluid.    03/20/2023 MRI brain with and without contrast:  Post resection of anterior right frontal lobe mass with no acute intracranial pathology and no significant interval change compared to the most recent prior MRI brain dated 02/02/2023.    02/02/23 MRI Brain w/wo contrast: Findings consistent with large surgical resection defect in the anterior right frontal lobe. Interval evolution of the hemorrhage or proteinaceous debris within the resection cavity. Small focus of nodular enhancement at the dorsal surface of the resection.    09/21/2022 MRI brain with and without contrast:  Findings consistent with recent craniotomy in the right frontal region for resection of a hemorrhagic mass with some persistent enhancement seen along with the mesial margin of the surgical bed suggesting some residual mass.  Fluid seen in the surgical bed with subdural hematoma seen on the right side as well as with air seen on craniotomy site.  Midline shift from right to left, intraventricular hemorrhage seen in the right lateral ventricle.    Laboratory  10/03/2023 CMP unremarkable, WBC count 4.59, hemoglobin 13.7, MCV 88, platelet count 157, ANC 2.4, ALC 1.28  09/05/2023 CMP unremarkable, WBC count 3.63, hemoglobin 13.3, platelet count 172, ANC 1.89.  07/30/23: Cr 1.02, alb 4.2, ca  9.6, wbc 3.11, hgb 12.7, plt 84, ANC 1.5  06/27/23: Creat 0.97, abl 4.5, lft's wnl, Bili 1.4, WBC 3.11, Hgb 13.6, MCV 89.6, , ANC 1.42  05/09/2023:  Creatinine 1.38, albumin 4.3, calcium 9.7, LFTs within normal limits, WBC count 1.9, hemoglobin 13.1, MCV 87.9, platelet count 128, ANC 0.92.  02/13/23: Cr 1.30, alb 4.3, ca 9.9, LFTs WNL, TB 1.3, mag 1.9, K+ 3.5, phosphorus 3.7, folic acid 4.89, B12 247, wbc 3.23, hgb 11.9, plt 129, ANC 2.10  01/15/2023:  Creatinine 1.20, albumin 4.6, calcium 10.0, alkaline phosphatase 58, total bili 2.2, AST 19, ALT 15, potassium 3.1, WBC count 3.69, hemoglobin 13.9, platelet 251, ANC 1.89.  12/14/2022:  Creatinine 1.53, albumin 4.1, calcium 9.6, LFTs within normal limits, WBC count 3.9, hemoglobin 14.4, MCV 87, platelet count 358, ANC 2.01  11/30/22: cr 1.43, alb 4.0, ca 10, alkphos 66, LFTs WNL, wbc 5.05, hgb 14.9, plt 302, ANC 2.91  11/15/2022:  Creatinine 1.05, albumin 4.3, calcium 10.5, alk-phos 59, total bili 1.1, AST 30, ALT 54, WBC 8.6, hemoglobin 15.2, MCV 88.7, platelet count 493, ANC 5.21.  ALC 2.29.  11/06/2022:  Creatinine 1.04, albumin 4.2, calcium 10.5, alkaline phosphatase 69, total bili 1.2, AST 26, ALT 54, WBC count 7.95, hemoglobin 15.1, MCV 87.6, platelet count 523, ANC 5.16         Past Medical History:   Diagnosis Date    Astrocytoma     Glioblastoma multiforme        Past Surgical History:   Procedure Laterality Date    CRANIOTOMY FOR EXCISION OF INTRACRANIAL TUMOR      VENTRICULOSTOMY Left 9/20/2022    Procedure: VENTRICULOSTOMY;  Surgeon: Duglas Fowler MD;  Location: [facility] OR;  Service: Neurosurgery;  Laterality: Left;       Family History   Problem Relation Age of Onset    Hypertension Father        Social History     Socioeconomic History    Marital status: Unknown   Tobacco Use    Smoking status: Never    Smokeless tobacco: Never   Substance and Sexual Activity    Alcohol use: Never    Drug use: Never       Current Outpatient Medications   Medication  Sig Dispense Refill    folic acid (FOLVITE) 1 MG tablet Take 1 tablet (1 mg total) by mouth once daily. 30 tablet 4    levETIRAcetam (KEPPRA) 500 MG Tab Take 1 tablet (500 mg total) by mouth 2 (two) times daily. 60 tablet 11    ondansetron (ZOFRAN) 8 MG tablet Take 1 tablet (8 mg total) by mouth every 8 (eight) hours as needed for Nausea. 30 tablet 1    potassium chloride SA (K-DUR,KLOR-CON) 20 MEQ tablet Take 1 tablet (20 mEq total) by mouth once daily. 30 tablet 3    temozolomide (TEMODAR) 20 MG capsule TAKE TWO 20MG CAPSULES BY MOUTH DAILY WITH ONE 250MG TEMOZOLOMIDE TO EQUAL 290MG TOTAL FOR 5 DAYS OF 28 DAY CYCLE. 10 capsule 5    temozolomide (TEMODAR) 250 MG capsule TAKE ONE (250MG) CAPSULE BY MOUTH DAILY WITH TWO (20MG) TEMOZOLOMIDE CAPSULES FOR A TOTAL  OF 290MG DAILY FOR 5 DAYS OF 28 DAY CYCLE 5 capsule 5     No current facility-administered medications for this visit.       Review of patient's allergies indicates:  No Known Allergies    Review of system  CONSTITUTIONAL: no fevers, no chills, no weight loss, no fatigue, no weakness  HEMATOLOGIC: no abnormal bleeding, no abnormal bruising, no drenching night sweats  ONCOLOGIC: no new masses or lumps  HEENT: no vision loss, no tinnitus or hearing loss, no nose bleeding, no dysphagia, no odynophagia  CVS: no chest pain, no palpitations, no dyspnea on exertion  RESP: no shortness of breath, no hemoptysis, no cough  GI: no nausea, no vomiting, no diarrhea, no constipation, no melena, no hematochezia, no hematemesis, no abdominal pain, no increase in abdominal girth  : no dysuria, no hematuria, no hesitancy, no scrotal swelling, no discharge  INTEGUMENT: no rashes, no abnormal bruising, no nail pitting, no hyperpigmentation  NEURO: no falls, no memory loss, no paresthesias or dysesthesias, no urofecal incontinence or retention, no loss of strength on any extremity  MSK: no back pain, no new joint pain, no joint swelling  PSYCH: no suicidal or homicidal ideation,  no depression, no insomnia, no anhedonia  ENDOCRINE: no heat or cold intolerance, no polyuria, no polydipsia      Physical Exam:  Blood pressure (!) 159/93, pulse 67, temperature 97.8 °F (36.6 °C), temperature source Oral, resp. rate 18, height 6' (1.829 m), weight 64.5 kg (142 lb 3.2 oz), SpO2 100 %.   ECOG PS 0  GA: AAOx3, NAD  HEENT: NCAT, PERRLA, EOMI, good dentition, moist oral mucous membranes, well-healed incision over the right frontal region  LYMPH: no cervical, axillary or supraclavicular adenopathy  CVS: s1s2 RRR, no M/R/G  RESP: CTA b/l, no crackles, no wheezes or rhonchi  ABD: soft, NT, ND, BS+, no hepatosplenomegaly  EXT: no deformities, no pedal edema  SKIN: no rashes, warm and dry  NEURO: normal mentation, strength 5/5 on all 4 extremities, no sensory deficits, cranial nerves 2-12 grossly intact      Assessment and plan    # Astrocytoma, WHO grade 4, IDH1 mutated,  ATRX mutated, TP53 mutated MGMT methylation present.   Status post gross total resection on 09/20/2022   Reviewed pathology report, noted grade 4 astrocytoma, IDH1 mutated, ATRX mutated and MGMT methylation present  Discussed with patient and his father the diagnosis, aggressive nature of disease, poor prognosis and treatment recommendations including adjuvant chemoradiation followed by chemotherapy with Temodar.    Discussed the option to explore clinical trials given patient's young age and good ECOG.  Prescription for Temodar 75 mg per m2 days 1-7 concurrently with radiation sent on his initial visit  Patient started concurrent chemo RT on 11/02/2022.  Noted updated pathology report with neuro are expanded panel and integrated diagnosis of astrocytoma, grade 4, IDH mutant.  Will plan for adjuvant temozolomide for 12 months following completion of concurrent chemo RT.   MRI brain with and without contrast in September 2023 with chronic findings which are very similar to recent MRI brain from 07/23 with no new acute abnormality    Plan    Reviewed labs, patient will receive being his next cycle of Temodar on 10/06/2023   Follow-up in 4 weeks, labs the day prior to his next cycle of treatment to assess treatment tolerance      Portions of the record may have been created with voice recognition software. Occasional wrong-word or sound-a-like substitutions may have occurred due to the inherent limitations of voice recognition software. Read the chart carefully and recognize, using context, where substitutions have occurred.

## 2023-10-30 ENCOUNTER — TELEPHONE (OUTPATIENT)
Dept: HEMATOLOGY/ONCOLOGY | Facility: CLINIC | Age: 33
End: 2023-10-30
Payer: COMMERCIAL

## 2023-10-30 NOTE — TELEPHONE ENCOUNTER
Pt reports pharmacy is requesting PA for Temodar. Spoke with Nu with [employer] prior auth done and approved. Auth# Temodar 250mg [id number] and Temodar 20mg [id number] through 10/30/24. [facility] and Pt notified of approval.--SC, LPN

## 2023-11-03 ENCOUNTER — OFFICE VISIT (OUTPATIENT)
Dept: HEMATOLOGY/ONCOLOGY | Facility: CLINIC | Age: 33
End: 2023-11-03
Payer: COMMERCIAL

## 2023-11-03 VITALS
SYSTOLIC BLOOD PRESSURE: 153 MMHG | BODY MASS INDEX: 19.89 KG/M2 | DIASTOLIC BLOOD PRESSURE: 90 MMHG | TEMPERATURE: 98 F | OXYGEN SATURATION: 100 % | HEART RATE: 73 BPM | RESPIRATION RATE: 18 BRPM | HEIGHT: 72 IN | WEIGHT: 146.88 LBS

## 2023-11-03 DIAGNOSIS — Z51.11 ENCOUNTER FOR ANTINEOPLASTIC CHEMOTHERAPY: Primary | ICD-10-CM

## 2023-11-03 DIAGNOSIS — C71.9 GRADE IV ASTROCYTOMA: ICD-10-CM

## 2023-11-03 PROCEDURE — 3008F PR BODY MASS INDEX (BMI) DOCUMENTED: ICD-10-PCS | Mod: CPTII,,, | Performed by: STUDENT IN AN ORGANIZED HEALTH CARE EDUCATION/TRAINING PROGRAM

## 2023-11-03 PROCEDURE — 3080F DIAST BP >= 90 MM HG: CPT | Mod: CPTII,,, | Performed by: STUDENT IN AN ORGANIZED HEALTH CARE EDUCATION/TRAINING PROGRAM

## 2023-11-03 PROCEDURE — 3077F SYST BP >= 140 MM HG: CPT | Mod: CPTII,,, | Performed by: STUDENT IN AN ORGANIZED HEALTH CARE EDUCATION/TRAINING PROGRAM

## 2023-11-03 PROCEDURE — 99215 PR OFFICE/OUTPT VISIT, EST, LEVL V, 40-54 MIN: ICD-10-PCS | Mod: ,,, | Performed by: STUDENT IN AN ORGANIZED HEALTH CARE EDUCATION/TRAINING PROGRAM

## 2023-11-03 PROCEDURE — 1159F PR MEDICATION LIST DOCUMENTED IN MEDICAL RECORD: ICD-10-PCS | Mod: CPTII,,, | Performed by: STUDENT IN AN ORGANIZED HEALTH CARE EDUCATION/TRAINING PROGRAM

## 2023-11-03 PROCEDURE — 3008F BODY MASS INDEX DOCD: CPT | Mod: CPTII,,, | Performed by: STUDENT IN AN ORGANIZED HEALTH CARE EDUCATION/TRAINING PROGRAM

## 2023-11-03 PROCEDURE — 99215 OFFICE O/P EST HI 40 MIN: CPT | Mod: ,,, | Performed by: STUDENT IN AN ORGANIZED HEALTH CARE EDUCATION/TRAINING PROGRAM

## 2023-11-03 PROCEDURE — 3080F PR MOST RECENT DIASTOLIC BLOOD PRESSURE >= 90 MM HG: ICD-10-PCS | Mod: CPTII,,, | Performed by: STUDENT IN AN ORGANIZED HEALTH CARE EDUCATION/TRAINING PROGRAM

## 2023-11-03 PROCEDURE — 3077F PR MOST RECENT SYSTOLIC BLOOD PRESSURE >= 140 MM HG: ICD-10-PCS | Mod: CPTII,,, | Performed by: STUDENT IN AN ORGANIZED HEALTH CARE EDUCATION/TRAINING PROGRAM

## 2023-11-03 PROCEDURE — 1159F MED LIST DOCD IN RCRD: CPT | Mod: CPTII,,, | Performed by: STUDENT IN AN ORGANIZED HEALTH CARE EDUCATION/TRAINING PROGRAM

## 2023-11-03 NOTE — PROGRESS NOTES
Referring provider: Dr. Sauceda  Reason for consult:  High-grade glioma     Diagnosis   Astrocytoma, WHO grade 4, IDH1 mutated,  ATRX mutated, TP53 mutated MGMT methylation present.       Current treatment    1/17/23- current: Temozolomide 150 mg/m2 day 1-5 q 28 days    Treatment history     11/02/2022-12/15/2022:  Concurrent chemo RT with temozolomide 75 mg per m2 days daily      Patient is a 32-year-old with no past medical history who presented to the ER [place] [facility] on 09/20/2022 with symptoms of nausea, vomiting and headaches.  He had a CT of his head done in the ER which showed a right frontal lobe mass concerning for malignancy.  He was transferred to [facility] for neurosurgical consultation.  He underwent a right frontoparietal craniotomy with near total resection of the frontal lobe mass, pathology from which revealed high-grade glioma pending further classification.  Patient was discharged from hospital soon after his resection without any complications.    He presented to clinic on 10/05/2022 to establish care to discuss further disease management.      ECOG 0, worked in [employer] prior to his presentation, lives with his father, denies any family history of malignancy.  Former smoker, quit for many years, denies alcohol or recreational drug use.    Interval history     Today, 11/03/2023, patient denies any acute concerns today.  He denies any headaches, vision changes or focal weakness.  He reports tolerance and compliance with Temodar.  He denies any nausea with it.  He denies any easy bruising, bleeding, fevers or chills.      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    Imaging     09/25/2023 MRI brain with and without contrast: Chronic findings as above which are similar to the most recent prior brain MRI 07/25/2023 with no new abnormality at the head/brain.       07/25/2023 MRI brain w/w/o contrast: A large  surgical resection defect is again seen at the anterior right frontal lobe, with surrounding gliosis.  Relatively thin rim enhancement is identified at the surgical resection border, appearing stable in appearance. Grossly stable postsurgical resection changes in the anterior right frontal lobe.    05/23/2023 MRI brain with and without contrast:  Post resection of the right anterior frontal lobe mass with no acute intracranial pathology and no significant interval change at the resection cavity compared to the most recent MRI from March and February 2023.  Left axillary more advanced than bilateral anterior ethmoid chronic sinusitis with no paranasal fluid.    03/20/2023 MRI brain with and without contrast:  Post resection of anterior right frontal lobe mass with no acute intracranial pathology and no significant interval change compared to the most recent prior MRI brain dated 02/02/2023.    02/02/23 MRI Brain w/wo contrast: Findings consistent with large surgical resection defect in the anterior right frontal lobe. Interval evolution of the hemorrhage or proteinaceous debris within the resection cavity. Small focus of nodular enhancement at the dorsal surface of the resection.    09/21/2022 MRI brain with and without contrast:  Findings consistent with recent craniotomy in the right frontal region for resection of a hemorrhagic mass with some persistent enhancement seen along with the mesial margin of the surgical bed suggesting some residual mass.  Fluid seen in the surgical bed with subdural hematoma seen on the right side as well as with air seen on craniotomy site.  Midline shift from right to left, intraventricular hemorrhage seen in the right lateral ventricle.    Laboratory    11/02/2023 creatinine 1.15, CMP unremarkable, WBC count 3.7, hemoglobin 12.8, MCV 90.1, platelet count 160, ANC 1.6.  10/03/2023 CMP unremarkable, WBC count 4.59, hemoglobin 13.7, MCV 88, platelet count 157, ANC 2.4, ALC  1.28  09/05/2023 CMP unremarkable, WBC count 3.63, hemoglobin 13.3, platelet count 172, ANC 1.89.  07/30/23: Cr 1.02, alb 4.2, ca 9.6, wbc 3.11, hgb 12.7, plt 84, ANC 1.5  06/27/23: Creat 0.97, abl 4.5, lft's wnl, Bili 1.4, WBC 3.11, Hgb 13.6, MCV 89.6, , ANC 1.42  05/09/2023:  Creatinine 1.38, albumin 4.3, calcium 9.7, LFTs within normal limits, WBC count 1.9, hemoglobin 13.1, MCV 87.9, platelet count 128, ANC 0.92.  02/13/23: Cr 1.30, alb 4.3, ca 9.9, LFTs WNL, TB 1.3, mag 1.9, K+ 3.5, phosphorus 3.7, folic acid 4.89, B12 247, wbc 3.23, hgb 11.9, plt 129, ANC 2.10  01/15/2023:  Creatinine 1.20, albumin 4.6, calcium 10.0, alkaline phosphatase 58, total bili 2.2, AST 19, ALT 15, potassium 3.1, WBC count 3.69, hemoglobin 13.9, platelet 251, ANC 1.89.  12/14/2022:  Creatinine 1.53, albumin 4.1, calcium 9.6, LFTs within normal limits, WBC count 3.9, hemoglobin 14.4, MCV 87, platelet count 358, ANC 2.01  11/30/22: cr 1.43, alb 4.0, ca 10, alkphos 66, LFTs WNL, wbc 5.05, hgb 14.9, plt 302, ANC 2.91  11/15/2022:  Creatinine 1.05, albumin 4.3, calcium 10.5, alk-phos 59, total bili 1.1, AST 30, ALT 54, WBC 8.6, hemoglobin 15.2, MCV 88.7, platelet count 493, ANC 5.21.  ALC 2.29.  11/06/2022:  Creatinine 1.04, albumin 4.2, calcium 10.5, alkaline phosphatase 69, total bili 1.2, AST 26, ALT 54, WBC count 7.95, hemoglobin 15.1, MCV 87.6, platelet count 523, ANC 5.16         Past Medical History:   Diagnosis Date    Astrocytoma     Glioblastoma multiforme        Past Surgical History:   Procedure Laterality Date    CRANIOTOMY FOR EXCISION OF INTRACRANIAL TUMOR      VENTRICULOSTOMY Left 9/20/2022    Procedure: VENTRICULOSTOMY;  Surgeon: Duglas Fowler MD;  Location: [facility];  Service: Neurosurgery;  Laterality: Left;       Family History   Problem Relation Age of Onset    Hypertension Father        Social History     Socioeconomic History    Marital status: Unknown   Tobacco Use    Smoking status: Never    Smokeless  tobacco: Never   Substance and Sexual Activity    Alcohol use: Never    Drug use: Never       Current Outpatient Medications   Medication Sig Dispense Refill    folic acid (FOLVITE) 1 MG tablet Take 1 tablet (1 mg total) by mouth once daily. 30 tablet 4    levETIRAcetam (KEPPRA) 500 MG Tab Take 1 tablet (500 mg total) by mouth 2 (two) times daily. 60 tablet 11    ondansetron (ZOFRAN) 8 MG tablet Take 1 tablet (8 mg total) by mouth every 8 (eight) hours as needed for Nausea. 30 tablet 1    potassium chloride SA (K-DUR,KLOR-CON) 20 MEQ tablet Take 1 tablet (20 mEq total) by mouth once daily. 30 tablet 3    temozolomide (TEMODAR) 20 MG capsule TAKE TWO 20MG CAPSULES BY MOUTH DAILY WITH ONE 250MG TEMOZOLOMIDE TO EQUAL 290MG TOTAL FOR 5 DAYS OF 28 DAY CYCLE. 10 capsule 5    temozolomide (TEMODAR) 250 MG capsule TAKE ONE (250MG) CAPSULE BY MOUTH DAILY WITH TWO (20MG) TEMOZOLOMIDE CAPSULES FOR A TOTAL  OF 290MG DAILY FOR 5 DAYS OF 28 DAY CYCLE 5 capsule 5     No current facility-administered medications for this visit.       Review of patient's allergies indicates:  No Known Allergies    Review of system  CONSTITUTIONAL: no fevers, no chills, no weight loss, no fatigue, no weakness  HEMATOLOGIC: no abnormal bleeding, no abnormal bruising, no drenching night sweats  ONCOLOGIC: no new masses or lumps  HEENT: no vision loss, no tinnitus or hearing loss, no nose bleeding, no dysphagia, no odynophagia  CVS: no chest pain, no palpitations, no dyspnea on exertion  RESP: no shortness of breath, no hemoptysis, no cough  GI: no nausea, no vomiting, no diarrhea, no constipation, no melena, no hematochezia, no hematemesis, no abdominal pain, no increase in abdominal girth  : no dysuria, no hematuria, no hesitancy, no scrotal swelling, no discharge  INTEGUMENT: no rashes, no abnormal bruising, no nail pitting, no hyperpigmentation  NEURO: no falls, no memory loss, no paresthesias or dysesthesias, no urofecal incontinence or  retention, no loss of strength on any extremity  MSK: no back pain, no new joint pain, no joint swelling  PSYCH: no suicidal or homicidal ideation, no depression, no insomnia, no anhedonia  ENDOCRINE: no heat or cold intolerance, no polyuria, no polydipsia      Physical Exam:  Blood pressure (!) 153/90, pulse 73, temperature 98.2 °F (36.8 °C), temperature source Oral, resp. rate 18, height 6' (1.829 m), weight 66.6 kg (146 lb 14.4 oz), SpO2 100 %.   ECOG PS 0  GA: AAOx3, NAD  HEENT: NCAT, PERRLA, EOMI, good dentition, moist oral mucous membranes, well-healed incision over the right frontal region  LYMPH: no cervical, axillary or supraclavicular adenopathy  CVS: s1s2 RRR, no M/R/G  RESP: CTA b/l, no crackles, no wheezes or rhonchi  ABD: soft, NT, ND, BS+, no hepatosplenomegaly  EXT: no deformities, no pedal edema  SKIN: no rashes, warm and dry  NEURO: normal mentation, strength 5/5 on all 4 extremities, no sensory deficits, cranial nerves 2-12 grossly intact      Assessment and plan    # Astrocytoma, WHO grade 4, IDH1 mutated,  ATRX mutated, TP53 mutated MGMT methylation present.   Status post gross total resection on 09/20/2022   Reviewed pathology report, noted grade 4 astrocytoma, IDH1 mutated, ATRX mutated and MGMT methylation present  Discussed with patient and his father the diagnosis, aggressive nature of disease, poor prognosis and treatment recommendations including adjuvant chemoradiation followed by chemotherapy with Temodar.    Discussed the option to explore clinical trials given patient's young age and good ECOG.  Prescription for Temodar 75 mg per m2 days 1-7 concurrently with radiation sent on his initial visit  Patient started concurrent chemo RT on 11/02/2022.  Noted updated pathology report with neuro are expanded panel and integrated diagnosis of astrocytoma, grade 4, IDH mutant.  Will plan for adjuvant temozolomide for 12 months following completion of concurrent chemo RT.   MRI brain with and  without contrast in September 2023 with chronic findings which are very similar to recent MRI brain from 07/23 with no new acute abnormality        Plan   Reviewed labs, patient will receive being his next cycle of Temodar today  Follow-up in 4 weeks, labs the day prior to his next cycle of treatment to assess treatment tolerance  Patient is scheduled for an MRI brain with Radiation Oncology towards end of November 2023.    Portions of the record may have been created with voice recognition software. Occasional wrong-word or sound-a-like substitutions may have occurred due to the inherent limitations of voice recognition software. Read the chart carefully and recognize, using context, where substitutions have occurred.

## 2023-11-30 DIAGNOSIS — E53.8 FOLIC ACID DEFICIENCY: ICD-10-CM

## 2023-11-30 RX ORDER — FOLIC ACID 1 MG/1
1 TABLET ORAL DAILY
Qty: 30 TABLET | Refills: 4 | Status: SHIPPED | OUTPATIENT
Start: 2023-11-30 | End: 2023-12-01 | Stop reason: SDUPTHER

## 2023-11-30 RX ORDER — FOLIC ACID 1 MG/1
1 TABLET ORAL DAILY
Qty: 30 TABLET | Refills: 4 | Status: SHIPPED | OUTPATIENT
Start: 2023-11-30 | End: 2023-11-30 | Stop reason: SDUPTHER

## 2023-12-01 ENCOUNTER — OFFICE VISIT (OUTPATIENT)
Dept: HEMATOLOGY/ONCOLOGY | Facility: CLINIC | Age: 33
End: 2023-12-01
Payer: COMMERCIAL

## 2023-12-01 VITALS
HEART RATE: 66 BPM | TEMPERATURE: 98 F | SYSTOLIC BLOOD PRESSURE: 130 MMHG | RESPIRATION RATE: 20 BRPM | WEIGHT: 152 LBS | BODY MASS INDEX: 20.59 KG/M2 | DIASTOLIC BLOOD PRESSURE: 80 MMHG | OXYGEN SATURATION: 100 % | HEIGHT: 72 IN

## 2023-12-01 DIAGNOSIS — C71.9 HIGH GRADE GLIOMA NOT CLASSIFIABLE BY WHO CRITERIA: ICD-10-CM

## 2023-12-01 DIAGNOSIS — C71.9 GRADE IV ASTROCYTOMA: ICD-10-CM

## 2023-12-01 DIAGNOSIS — E53.8 FOLIC ACID DEFICIENCY: ICD-10-CM

## 2023-12-01 DIAGNOSIS — Z51.11 ENCOUNTER FOR ANTINEOPLASTIC CHEMOTHERAPY: Primary | ICD-10-CM

## 2023-12-01 PROCEDURE — 3075F SYST BP GE 130 - 139MM HG: CPT | Mod: CPTII,,, | Performed by: NURSE PRACTITIONER

## 2023-12-01 PROCEDURE — 3079F DIAST BP 80-89 MM HG: CPT | Mod: CPTII,,, | Performed by: NURSE PRACTITIONER

## 2023-12-01 PROCEDURE — 1159F MED LIST DOCD IN RCRD: CPT | Mod: CPTII,,, | Performed by: NURSE PRACTITIONER

## 2023-12-01 PROCEDURE — 3075F PR MOST RECENT SYSTOLIC BLOOD PRESS GE 130-139MM HG: ICD-10-PCS | Mod: CPTII,,, | Performed by: NURSE PRACTITIONER

## 2023-12-01 PROCEDURE — 99214 PR OFFICE/OUTPT VISIT, EST, LEVL IV, 30-39 MIN: ICD-10-PCS | Mod: ,,, | Performed by: NURSE PRACTITIONER

## 2023-12-01 PROCEDURE — 99214 OFFICE O/P EST MOD 30 MIN: CPT | Mod: ,,, | Performed by: NURSE PRACTITIONER

## 2023-12-01 PROCEDURE — 1160F RVW MEDS BY RX/DR IN RCRD: CPT | Mod: CPTII,,, | Performed by: NURSE PRACTITIONER

## 2023-12-01 PROCEDURE — 1159F PR MEDICATION LIST DOCUMENTED IN MEDICAL RECORD: ICD-10-PCS | Mod: CPTII,,, | Performed by: NURSE PRACTITIONER

## 2023-12-01 PROCEDURE — 1160F PR REVIEW ALL MEDS BY PRESCRIBER/CLIN PHARMACIST DOCUMENTED: ICD-10-PCS | Mod: CPTII,,, | Performed by: NURSE PRACTITIONER

## 2023-12-01 PROCEDURE — 3079F PR MOST RECENT DIASTOLIC BLOOD PRESSURE 80-89 MM HG: ICD-10-PCS | Mod: CPTII,,, | Performed by: NURSE PRACTITIONER

## 2023-12-01 PROCEDURE — 3008F PR BODY MASS INDEX (BMI) DOCUMENTED: ICD-10-PCS | Mod: CPTII,,, | Performed by: NURSE PRACTITIONER

## 2023-12-01 PROCEDURE — 3008F BODY MASS INDEX DOCD: CPT | Mod: CPTII,,, | Performed by: NURSE PRACTITIONER

## 2023-12-01 RX ORDER — FOLIC ACID 1 MG/1
1 TABLET ORAL DAILY
Qty: 30 TABLET | Refills: 4 | Status: SHIPPED | OUTPATIENT
Start: 2023-12-01 | End: 2024-11-30

## 2023-12-01 NOTE — PROGRESS NOTES
Referring provider: Dr. Sauceda  Reason for consult:  High-grade glioma     Diagnosis   Astrocytoma, WHO grade 4, IDH1 mutated,  ATRX mutated, TP53 mutated MGMT methylation present.       Current treatment    1/17/23- current: Temozolomide 150 mg/m2 day 1-5 q 28 days    Treatment history     11/02/2022-12/15/2022:  Concurrent chemo RT with temozolomide 75 mg per m2 days daily      Patient is a 32-year-old with no past medical history who presented to the [place] [facility] [place]20/2022 with symptoms of nausea, vomiting and headaches.  He had a CT of his head done in the ER which showed a right frontal lobe mass concerning for malignancy.  He was transferred to [facility] for neurosurgical consultation.  He underwent a right frontoparietal craniotomy with near total resection of the frontal lobe mass, pathology from which revealed high-grade glioma pending further classification.  Patient was discharged from hospital soon after his resection without any complications.    He presented to clinic on 10/05/2022 to establish care to discuss further disease management.      ECOG 0, worked in [employer] prior to his presentation, lives with his father, denies any family history of malignancy.  Former smoker, quit for many years, denies alcohol or recreational drug use.    Interval history     Mr. Anthony is here today with his father for four-week follow-up regarding Grade IV astrocytoma.  Today, he reports nausea with the last two cycles.  Reports he is taking one Zofran 60 minutes prior to Temodur.  Advised patient to take Zofran around the clock for the days 1-5 to reduce the nausea.  He denies any stroke-like symptoms, vision changes, seizure activity, headaches, fatigue, constipation, abnormal bleeding, anorexia, rash, dizziness fever, chills, and insomnia.  Labs reviewed with patient dated 11/30/2023:  K+ 3.4, creatinine 1.09, Total Bili 1.4 with normal AST, ALT and Alk Phos, WBC 3.32, Hgb 13.1, Hct 39.2, Plt  164,000, and ANC 1600.  Patient aware of his MRI Brain results dated 11/28/2023:  Chronic right anterior frontal lobe mass resection changes with no new or nodular abnormal enhancement.  No acute pathology at the head/brain.  Continues to work nights full-time.  Will begin new cycle today, 1-5 days on 23 days  off for 28-day cycle.  Taking Temodur 290 mg PO.  No recent hospitalizations, infections, or illnesses.    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    Imaging     MRI Brain results dated 11/28/2023:  Chronic right anterior frontal lobe mass resection changes with no new or nodular abnormal enhancement.  No acute pathology at the head/brain.    09/25/2023 MRI brain with and without contrast: Chronic findings as above which are similar to the most recent prior brain MRI 07/25/2023 with no new abnormality at the head/brain.       07/25/2023 MRI brain w/w/o contrast: A large surgical resection defect is again seen at the anterior right frontal lobe, with surrounding gliosis.  Relatively thin rim enhancement is identified at the surgical resection border, appearing stable in appearance. Grossly stable postsurgical resection changes in the anterior right frontal lobe.    05/23/2023 MRI brain with and without contrast:  Post resection of the right anterior frontal lobe mass with no acute intracranial pathology and no significant interval change at the resection cavity compared to the most recent MRI from March and February 2023.  Left axillary more advanced than bilateral anterior ethmoid chronic sinusitis with no paranasal fluid.    03/20/2023 MRI brain with and without contrast:  Post resection of anterior right frontal lobe mass with no acute intracranial pathology and no significant interval change compared to the most recent prior MRI brain dated 02/02/2023.    02/02/23 MRI Brain w/wo contrast: Findings consistent with large  surgical resection defect in the anterior right frontal lobe. Interval evolution of the hemorrhage or proteinaceous debris within the resection cavity. Small focus of nodular enhancement at the dorsal surface of the resection.    09/21/2022 MRI brain with and without contrast:  Findings consistent with recent craniotomy in the right frontal region for resection of a hemorrhagic mass with some persistent enhancement seen along with the mesial margin of the surgical bed suggesting some residual mass.  Fluid seen in the surgical bed with subdural hematoma seen on the right side as well as with air seen on craniotomy site.  Midline shift from right to left, intraventricular hemorrhage seen in the right lateral ventricle.    Laboratory  11/30/2023:  K+ 3.4, creatinine 1.09, Total Bili 1.4 with normal AST, ALT and Alk Phos, WBC 3.32, Hgb 13.1, Hct 39.2, Plt 164,000, and ANC 1600  11/02/2023 creatinine 1.15, CMP unremarkable, WBC count 3.7, hemoglobin 12.8, MCV 90.1, platelet count 160, ANC 1.6.  10/03/2023 CMP unremarkable, WBC count 4.59, hemoglobin 13.7, MCV 88, platelet count 157, ANC 2.4, ALC 1.28  09/05/2023 CMP unremarkable, WBC count 3.63, hemoglobin 13.3, platelet count 172, ANC 1.89.  07/30/23: Cr 1.02, alb 4.2, ca 9.6, wbc 3.11, hgb 12.7, plt 84, ANC 1.5  06/27/23: Creat 0.97, abl 4.5, lft's wnl, Bili 1.4, WBC 3.11, Hgb 13.6, MCV 89.6, , ANC 1.42  05/09/2023:  Creatinine 1.38, albumin 4.3, calcium 9.7, LFTs within normal limits, WBC count 1.9, hemoglobin 13.1, MCV 87.9, platelet count 128, ANC 0.92.  02/13/23: Cr 1.30, alb 4.3, ca 9.9, LFTs WNL, TB 1.3, mag 1.9, K+ 3.5, phosphorus 3.7, folic acid 4.89, B12 247, wbc 3.23, hgb 11.9, plt 129, ANC 2.10  01/15/2023:  Creatinine 1.20, albumin 4.6, calcium 10.0, alkaline phosphatase 58, total bili 2.2, AST 19, ALT 15, potassium 3.1, WBC count 3.69, hemoglobin 13.9, platelet 251, ANC 1.89.  12/14/2022:  Creatinine 1.53, albumin 4.1, calcium 9.6, LFTs within  normal limits, WBC count 3.9, hemoglobin 14.4, MCV 87, platelet count 358, ANC 2.01  11/30/22: cr 1.43, alb 4.0, ca 10, alkphos 66, LFTs WNL, wbc 5.05, hgb 14.9, plt 302, ANC 2.91  11/15/2022:  Creatinine 1.05, albumin 4.3, calcium 10.5, alk-phos 59, total bili 1.1, AST 30, ALT 54, WBC 8.6, hemoglobin 15.2, MCV 88.7, platelet count 493, ANC 5.21.  ALC 2.29.  11/06/2022:  Creatinine 1.04, albumin 4.2, calcium 10.5, alkaline phosphatase 69, total bili 1.2, AST 26, ALT 54, WBC count 7.95, hemoglobin 15.1, MCV 87.6, platelet count 523, ANC 5.16         Past Medical History:   Diagnosis Date    Astrocytoma     Glioblastoma multiforme        Past Surgical History:   Procedure Laterality Date    CRANIOTOMY FOR EXCISION OF INTRACRANIAL TUMOR      VENTRICULOSTOMY Left 9/20/2022    Procedure: VENTRICULOSTOMY;  Surgeon: Duglas Fowler MD;  Location: [facility];  Service: Neurosurgery;  Laterality: Left;       Family History   Problem Relation Age of Onset    Hypertension Father        Social History     Socioeconomic History    Marital status: Unknown   Tobacco Use    Smoking status: Never    Smokeless tobacco: Never   Substance and Sexual Activity    Alcohol use: Never    Drug use: Never       Current Outpatient Medications   Medication Sig Dispense Refill    ondansetron (ZOFRAN) 8 MG tablet Take 1 tablet (8 mg total) by mouth every 8 (eight) hours as needed for Nausea. 30 tablet 1    temozolomide (TEMODAR) 20 MG capsule TAKE TWO 20MG CAPSULES BY MOUTH DAILY WITH ONE 250MG TEMOZOLOMIDE TO EQUAL 290MG TOTAL FOR 5 DAYS OF 28 DAY CYCLE. 10 capsule 5    temozolomide (TEMODAR) 250 MG capsule TAKE ONE (250MG) CAPSULE BY MOUTH DAILY WITH TWO (20MG) TEMOZOLOMIDE CAPSULES FOR A TOTAL  OF 290MG DAILY FOR 5 DAYS OF 28 DAY CYCLE 5 capsule 5    folic acid (FOLVITE) 1 MG tablet Take 1 tablet (1 mg total) by mouth once daily. 30 tablet 4    levETIRAcetam (KEPPRA) 500 MG Tab Take 1 tablet (500 mg total) by mouth 2 (two) times daily. (Patient  not taking: Reported on 12/1/2023) 60 tablet 11    potassium chloride SA (K-DUR,KLOR-CON) 20 MEQ tablet Take 1 tablet (20 mEq total) by mouth once daily. (Patient not taking: Reported on 12/1/2023) 30 tablet 3     No current facility-administered medications for this visit.       Review of patient's allergies indicates:  No Known Allergies    Review of system  CONSTITUTIONAL: no fevers, no chills, no weight loss, no fatigue, no weakness  HEMATOLOGIC: no abnormal bleeding, no abnormal bruising, no drenching night sweats  ONCOLOGIC: no new masses or lumps  HEENT: no vision loss, no tinnitus or hearing loss, no nose bleeding, no dysphagia, no odynophagia  CVS: no chest pain, no palpitations, no dyspnea on exertion  RESP: no shortness of breath, no hemoptysis, no cough  GI: no nausea, no vomiting, no diarrhea, no constipation, no melena, no hematochezia, no hematemesis, no abdominal pain, no increase in abdominal girth  : no dysuria, no hematuria, no hesitancy, no scrotal swelling, no discharge  INTEGUMENT: no rashes, no abnormal bruising, no nail pitting, no hyperpigmentation  NEURO: no falls, no memory loss, no paresthesias or dysesthesias, no urofecal incontinence or retention, no loss of strength on any extremity  MSK: no back pain, no new joint pain, no joint swelling  PSYCH: no suicidal or homicidal ideation, no depression, no insomnia, no anhedonia  ENDOCRINE: no heat or cold intolerance, no polyuria, no polydipsia      Physical Exam:  Blood pressure 130/80, pulse 66, temperature 97.7 °F (36.5 °C), temperature source Oral, resp. rate 20, height 6' (1.829 m), weight 68.9 kg (152 lb), SpO2 100 %.   ECOG PS 0  GA: AAOx3, NAD  HEENT: NCAT, PERRLA, EOMI, good dentition, moist oral mucous membranes, well-healed incision over the right frontal region  LYMPH: no cervical, axillary or supraclavicular adenopathy  CVS: s1s2 RRR, no M/R/G  RESP: CTA b/l, no crackles, no wheezes or rhonchi  ABD: soft, NT, ND, BS+, no  hepatosplenomegaly  EXT: no deformities, no pedal edema  SKIN: no rashes, warm and dry  NEURO: normal mentation, strength 5/5 on all 4 extremities, no sensory deficits, cranial nerves 2-12 grossly intact      Assessment and plan    # Astrocytoma, WHO grade 4, IDH1 mutated,  ATRX mutated, TP53 mutated MGMT methylation present.   Status post gross total resection on 09/20/2022   Reviewed pathology report, noted grade 4 astrocytoma, IDH1 mutated, ATRX mutated and MGMT methylation present  Discussed with patient and his father the diagnosis, aggressive nature of disease, poor prognosis and treatment recommendations including adjuvant chemoradiation followed by chemotherapy with Temodar.    Discussed the option to explore clinical trials given patient's young age and good ECOG.  Prescription for Temodar 75 mg per m2 days 1-7 concurrently with radiation sent on his initial visit  Patient started concurrent chemo RT on 11/02/2022.  Noted updated pathology report with neuro are expanded panel and integrated diagnosis of astrocytoma, grade 4, IDH mutant.  Will plan for adjuvant temozolomide for 12 months following completion of concurrent chemo RT.   MRI brain with and without contrast in September 2023 with chronic findings which are very similar to recent MRI brain from 07/23 with no new acute abnormality    12/1/2023 Assessment:  Patient doing well per his report.  Had nausea with the last 2 cycles.  Labs are stable.   MRI with no change from September 2023.      Plan   Reviewed labs, patient will begin his next cycle of Temodar today.  Advised to increase Zofran to cover nausea during his 5 days, take around the clock every 8 hours.    Potassium 3.4 today.  Will continue to monitor.  Follow-up in 4 weeks, labs the day prior to his next cycle of treatment to assess treatment tolerance with Dr. Renee.    The patient is in agreement with today's plan of care.  All questions answered.  Patient is aware to contact our  office for any problems or concerns prior to next visit.      Brie Martinez, MSN-ED, APRN, AGACNP-BC, OCN

## 2023-12-29 ENCOUNTER — OFFICE VISIT (OUTPATIENT)
Dept: HEMATOLOGY/ONCOLOGY | Facility: CLINIC | Age: 33
End: 2023-12-29
Payer: COMMERCIAL

## 2023-12-29 VITALS
TEMPERATURE: 99 F | BODY MASS INDEX: 21.17 KG/M2 | HEIGHT: 72 IN | SYSTOLIC BLOOD PRESSURE: 158 MMHG | RESPIRATION RATE: 18 BRPM | DIASTOLIC BLOOD PRESSURE: 88 MMHG | HEART RATE: 76 BPM | WEIGHT: 156.31 LBS | OXYGEN SATURATION: 100 %

## 2023-12-29 DIAGNOSIS — C71.9 GRADE IV ASTROCYTOMA: ICD-10-CM

## 2023-12-29 DIAGNOSIS — Z51.11 ENCOUNTER FOR ANTINEOPLASTIC CHEMOTHERAPY: Primary | ICD-10-CM

## 2023-12-29 PROCEDURE — 3077F SYST BP >= 140 MM HG: CPT | Mod: CPTII,,, | Performed by: STUDENT IN AN ORGANIZED HEALTH CARE EDUCATION/TRAINING PROGRAM

## 2023-12-29 PROCEDURE — 3079F DIAST BP 80-89 MM HG: CPT | Mod: CPTII,,, | Performed by: STUDENT IN AN ORGANIZED HEALTH CARE EDUCATION/TRAINING PROGRAM

## 2023-12-29 PROCEDURE — 3079F PR MOST RECENT DIASTOLIC BLOOD PRESSURE 80-89 MM HG: ICD-10-PCS | Mod: CPTII,,, | Performed by: STUDENT IN AN ORGANIZED HEALTH CARE EDUCATION/TRAINING PROGRAM

## 2023-12-29 PROCEDURE — 3008F PR BODY MASS INDEX (BMI) DOCUMENTED: ICD-10-PCS | Mod: CPTII,,, | Performed by: STUDENT IN AN ORGANIZED HEALTH CARE EDUCATION/TRAINING PROGRAM

## 2023-12-29 PROCEDURE — 3008F BODY MASS INDEX DOCD: CPT | Mod: CPTII,,, | Performed by: STUDENT IN AN ORGANIZED HEALTH CARE EDUCATION/TRAINING PROGRAM

## 2023-12-29 PROCEDURE — 99215 OFFICE O/P EST HI 40 MIN: CPT | Mod: ,,, | Performed by: STUDENT IN AN ORGANIZED HEALTH CARE EDUCATION/TRAINING PROGRAM

## 2023-12-29 PROCEDURE — 1159F MED LIST DOCD IN RCRD: CPT | Mod: CPTII,,, | Performed by: STUDENT IN AN ORGANIZED HEALTH CARE EDUCATION/TRAINING PROGRAM

## 2023-12-29 PROCEDURE — 3077F PR MOST RECENT SYSTOLIC BLOOD PRESSURE >= 140 MM HG: ICD-10-PCS | Mod: CPTII,,, | Performed by: STUDENT IN AN ORGANIZED HEALTH CARE EDUCATION/TRAINING PROGRAM

## 2023-12-29 PROCEDURE — 99215 PR OFFICE/OUTPT VISIT, EST, LEVL V, 40-54 MIN: ICD-10-PCS | Mod: ,,, | Performed by: STUDENT IN AN ORGANIZED HEALTH CARE EDUCATION/TRAINING PROGRAM

## 2023-12-29 PROCEDURE — 1159F PR MEDICATION LIST DOCUMENTED IN MEDICAL RECORD: ICD-10-PCS | Mod: CPTII,,, | Performed by: STUDENT IN AN ORGANIZED HEALTH CARE EDUCATION/TRAINING PROGRAM

## 2023-12-29 NOTE — PROGRESS NOTES
Referring provider: Dr. Sauceda  Reason for consult:  High-grade glioma     Diagnosis   Astrocytoma, WHO grade 4, IDH1 mutated,  ATRX mutated, TP53 mutated MGMT methylation present.       Current treatment    1/17/23- current: Temozolomide 150 mg/m2 day 1-5 q 28 days    Treatment history     11/02/2022-12/15/2022:  Concurrent chemo RT with temozolomide 75 mg per m2 days daily      Patient is a 32-year-old with no past medical history who presented to the ER at [facility] on 09/20/2022 with symptoms of nausea, vomiting and headaches.  He had a CT of his head done in the ER which showed a right frontal lobe mass concerning for malignancy.  He was transferred to [facility] for neurosurgical consultation.  He underwent a right frontoparietal craniotomy with near total resection of the frontal lobe mass, pathology from which revealed high-grade glioma pending further classification.  Patient was discharged from hospital soon after his resection without any complications.    He presented to clinic on 10/05/2022 to establish care to discuss further disease management.      ECOG 0, worked in [employer] prior to his presentation, lives with his father, denies any family history of malignancy.  Former smoker, quit for many years, denies alcohol or recreational drug use.    Interval history     Today, 12/29/2023, patient denies any acute concerns today.  He reports tolerating his last cycle of Temodar well without any significant side effects.  He denies any symptoms of nausea with his last cycle.  He denies any headaches, vision changes or focal weakness.      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    Imaging     MRI Brain results dated 11/28/2023:  Chronic right anterior frontal lobe mass resection changes with no new or nodular abnormal enhancement.  No acute pathology at the head/brain.    09/25/2023 MRI brain with and without  contrast: Chronic findings as above which are similar to the most recent prior brain MRI 07/25/2023 with no new abnormality at the head/brain.       07/25/2023 MRI brain w/w/o contrast: A large surgical resection defect is again seen at the anterior right frontal lobe, with surrounding gliosis.  Relatively thin rim enhancement is identified at the surgical resection border, appearing stable in appearance. Grossly stable postsurgical resection changes in the anterior right frontal lobe.    05/23/2023 MRI brain with and without contrast:  Post resection of the right anterior frontal lobe mass with no acute intracranial pathology and no significant interval change at the resection cavity compared to the most recent MRI from March and February 2023.  Left axillary more advanced than bilateral anterior ethmoid chronic sinusitis with no paranasal fluid.    03/20/2023 MRI brain with and without contrast:  Post resection of anterior right frontal lobe mass with no acute intracranial pathology and no significant interval change compared to the most recent prior MRI brain dated 02/02/2023.    02/02/23 MRI Brain w/wo contrast: Findings consistent with large surgical resection defect in the anterior right frontal lobe. Interval evolution of the hemorrhage or proteinaceous debris within the resection cavity. Small focus of nodular enhancement at the dorsal surface of the resection.    09/21/2022 MRI brain with and without contrast:  Findings consistent with recent craniotomy in the right frontal region for resection of a hemorrhagic mass with some persistent enhancement seen along with the mesial margin of the surgical bed suggesting some residual mass.  Fluid seen in the surgical bed with subdural hematoma seen on the right side as well as with air seen on craniotomy site.  Midline shift from right to left, intraventricular hemorrhage seen in the right lateral ventricle.    Laboratory  12/28/2023 WBC count 3.6, hemoglobin 13.4,  MCV 88.9 complete count 204, ANC 1.7, CMP unremarkable.  11/30/2023:  K+ 3.4, creatinine 1.09, Total Bili 1.4 with normal AST, ALT and Alk Phos, WBC 3.32, Hgb 13.1, Hct 39.2, Plt 164,000, and ANC 1600  11/02/2023 creatinine 1.15, CMP unremarkable, WBC count 3.7, hemoglobin 12.8, MCV 90.1, platelet count 160, ANC 1.6.  10/03/2023 CMP unremarkable, WBC count 4.59, hemoglobin 13.7, MCV 88, platelet count 157, ANC 2.4, ALC 1.28  09/05/2023 CMP unremarkable, WBC count 3.63, hemoglobin 13.3, platelet count 172, ANC 1.89.  07/30/23: Cr 1.02, alb 4.2, ca 9.6, wbc 3.11, hgb 12.7, plt 84, ANC 1.5  06/27/23: Creat 0.97, abl 4.5, lft's wnl, Bili 1.4, WBC 3.11, Hgb 13.6, MCV 89.6, , ANC 1.42  05/09/2023:  Creatinine 1.38, albumin 4.3, calcium 9.7, LFTs within normal limits, WBC count 1.9, hemoglobin 13.1, MCV 87.9, platelet count 128, ANC 0.92.  02/13/23: Cr 1.30, alb 4.3, ca 9.9, LFTs WNL, TB 1.3, mag 1.9, K+ 3.5, phosphorus 3.7, folic acid 4.89, B12 247, wbc 3.23, hgb 11.9, plt 129, ANC 2.10  01/15/2023:  Creatinine 1.20, albumin 4.6, calcium 10.0, alkaline phosphatase 58, total bili 2.2, AST 19, ALT 15, potassium 3.1, WBC count 3.69, hemoglobin 13.9, platelet 251, ANC 1.89.  12/14/2022:  Creatinine 1.53, albumin 4.1, calcium 9.6, LFTs within normal limits, WBC count 3.9, hemoglobin 14.4, MCV 87, platelet count 358, ANC 2.01  11/30/22: cr 1.43, alb 4.0, ca 10, alkphos 66, LFTs WNL, wbc 5.05, hgb 14.9, plt 302, ANC 2.91  11/15/2022:  Creatinine 1.05, albumin 4.3, calcium 10.5, alk-phos 59, total bili 1.1, AST 30, ALT 54, WBC 8.6, hemoglobin 15.2, MCV 88.7, platelet count 493, ANC 5.21.  ALC 2.29.  11/06/2022:  Creatinine 1.04, albumin 4.2, calcium 10.5, alkaline phosphatase 69, total bili 1.2, AST 26, ALT 54, WBC count 7.95, hemoglobin 15.1, MCV 87.6, platelet count 523, ANC 5.16         Past Medical History:   Diagnosis Date    Astrocytoma     Glioblastoma multiforme        Past Surgical History:   Procedure Laterality  Date    CRANIOTOMY FOR EXCISION OF INTRACRANIAL TUMOR      VENTRICULOSTOMY Left 9/20/2022    Procedure: VENTRICULOSTOMY;  Surgeon: Duglas Fowler MD;  Location: [facility];  Service: Neurosurgery;  Laterality: Left;       Family History   Problem Relation Age of Onset    Hypertension Father        Social History     Socioeconomic History    Marital status: Unknown   Tobacco Use    Smoking status: Never    Smokeless tobacco: Never   Substance and Sexual Activity    Alcohol use: Never    Drug use: Never       Current Outpatient Medications   Medication Sig Dispense Refill    folic acid (FOLVITE) 1 MG tablet Take 1 tablet (1 mg total) by mouth once daily. 30 tablet 4    ondansetron (ZOFRAN) 8 MG tablet Take 1 tablet (8 mg total) by mouth every 8 (eight) hours as needed for Nausea. 30 tablet 1    temozolomide (TEMODAR) 20 MG capsule TAKE TWO 20MG CAPSULES BY MOUTH DAILY WITH ONE 250MG TEMOZOLOMIDE TO EQUAL 290MG TOTAL FOR 5 DAYS OF 28 DAY CYCLE. 10 capsule 5    temozolomide (TEMODAR) 250 MG capsule TAKE ONE (250MG) CAPSULE BY MOUTH DAILY WITH TWO (20MG) TEMOZOLOMIDE CAPSULES FOR A TOTAL  OF 290MG DAILY FOR 5 DAYS OF 28 DAY CYCLE 5 capsule 5     No current facility-administered medications for this visit.       Review of patient's allergies indicates:  No Known Allergies    Review of system  CONSTITUTIONAL: no fevers, no chills, no weight loss, no fatigue, no weakness  HEMATOLOGIC: no abnormal bleeding, no abnormal bruising, no drenching night sweats  ONCOLOGIC: no new masses or lumps  HEENT: no vision loss, no tinnitus or hearing loss, no nose bleeding, no dysphagia, no odynophagia  CVS: no chest pain, no palpitations, no dyspnea on exertion  RESP: no shortness of breath, no hemoptysis, no cough  GI: no nausea, no vomiting, no diarrhea, no constipation, no melena, no hematochezia, no hematemesis, no abdominal pain, no increase in abdominal girth  : no dysuria, no hematuria, no hesitancy, no scrotal swelling, no  discharge  INTEGUMENT: no rashes, no abnormal bruising, no nail pitting, no hyperpigmentation  NEURO: no falls, no memory loss, no paresthesias or dysesthesias, no urofecal incontinence or retention, no loss of strength on any extremity  MSK: no back pain, no new joint pain, no joint swelling  PSYCH: no suicidal or homicidal ideation, no depression, no insomnia, no anhedonia  ENDOCRINE: no heat or cold intolerance, no polyuria, no polydipsia      Physical Exam:  Blood pressure (!) 158/88, pulse 76, temperature 98.5 °F (36.9 °C), temperature source Oral, resp. rate 18, height 6' (1.829 m), weight 70.9 kg (156 lb 4.8 oz), SpO2 100 %.   ECOG PS 0  GA: AAOx3, NAD  HEENT: NCAT, PERRLA, EOMI, good dentition, moist oral mucous membranes, well-healed incision over the right frontal region  LYMPH: no cervical, axillary or supraclavicular adenopathy  CVS: s1s2 RRR, no M/R/G  RESP: CTA b/l, no crackles, no wheezes or rhonchi  ABD: soft, NT, ND, BS+, no hepatosplenomegaly  EXT: no deformities, no pedal edema  SKIN: no rashes, warm and dry  NEURO: normal mentation, strength 5/5 on all 4 extremities, no sensory deficits, cranial nerves 2-12 grossly intact      Assessment and plan    # Astrocytoma, WHO grade 4, IDH1 mutated,  ATRX mutated, TP53 mutated MGMT methylation present.   Status post gross total resection on 09/20/2022   Reviewed pathology report, noted grade 4 astrocytoma, IDH1 mutated, ATRX mutated and MGMT methylation present  Discussed with patient and his father the diagnosis, aggressive nature of disease, poor prognosis and treatment recommendations including adjuvant chemoradiation followed by chemotherapy with Temodar.    Discussed the option to explore clinical trials given patient's young age and good ECOG.  Prescription for Temodar 75 mg per m2 days 1-7 concurrently with radiation sent on his initial visit  Patient started concurrent chemo RT on 11/02/2022.  Noted updated pathology report with neuro are expanded  panel and integrated diagnosis of astrocytoma, grade 4, IDH mutant.  Will plan for adjuvant temozolomide for 12 months following completion of concurrent chemo RT.   MRI brain with and without contrast in September 2023 with chronic findings which are very similar to recent MRI brain from 07/23 with no new acute abnormality  MRI brain in November 2023 with stable changes.    Plan   Reviewed labs, patient will begin his last cycle of Temodar today.  Confirmed with specialty pharmacy, patient was received 12 doses of Temodar to date  Plan to follow-up in 4 weeks to assess treatment tolerance and discuss surveillance.  Noted patient was scheduled for follow-up with radiation Oncology with an repeat MRI in January 2024      Portions of the record may have been created with voice recognition software. Occasional wrong-word or sound-a-like substitutions may have occurred due to the inherent limitations of voice recognition software.

## 2024-01-26 ENCOUNTER — OFFICE VISIT (OUTPATIENT)
Dept: HEMATOLOGY/ONCOLOGY | Facility: CLINIC | Age: 34
End: 2024-01-26
Payer: COMMERCIAL

## 2024-01-26 VITALS
OXYGEN SATURATION: 99 % | WEIGHT: 159.31 LBS | HEIGHT: 72 IN | RESPIRATION RATE: 18 BRPM | DIASTOLIC BLOOD PRESSURE: 100 MMHG | SYSTOLIC BLOOD PRESSURE: 160 MMHG | HEART RATE: 70 BPM | TEMPERATURE: 97 F | BODY MASS INDEX: 21.58 KG/M2

## 2024-01-26 DIAGNOSIS — C71.9 GRADE IV ASTROCYTOMA: Primary | ICD-10-CM

## 2024-01-26 PROCEDURE — 1159F MED LIST DOCD IN RCRD: CPT | Mod: CPTII,,, | Performed by: STUDENT IN AN ORGANIZED HEALTH CARE EDUCATION/TRAINING PROGRAM

## 2024-01-26 PROCEDURE — 3008F BODY MASS INDEX DOCD: CPT | Mod: CPTII,,, | Performed by: STUDENT IN AN ORGANIZED HEALTH CARE EDUCATION/TRAINING PROGRAM

## 2024-01-26 PROCEDURE — 3080F DIAST BP >= 90 MM HG: CPT | Mod: CPTII,,, | Performed by: STUDENT IN AN ORGANIZED HEALTH CARE EDUCATION/TRAINING PROGRAM

## 2024-01-26 PROCEDURE — 99214 OFFICE O/P EST MOD 30 MIN: CPT | Mod: ,,, | Performed by: STUDENT IN AN ORGANIZED HEALTH CARE EDUCATION/TRAINING PROGRAM

## 2024-01-26 PROCEDURE — 3077F SYST BP >= 140 MM HG: CPT | Mod: CPTII,,, | Performed by: STUDENT IN AN ORGANIZED HEALTH CARE EDUCATION/TRAINING PROGRAM

## 2024-01-26 NOTE — PROGRESS NOTES
Referring provider: Dr. Sauceda  Reason for consult:  High-grade glioma     Diagnosis   Astrocytoma, WHO grade 4, IDH1 mutated,  ATRX mutated, TP53 mutated MGMT methylation present.       Current treatment      Treatment history     11/02/2022-12/15/2022:  Concurrent chemo RT with temozolomide 75 mg per m2 days daily  1/17/23- 12/29/2023: Temozolomide 150 mg/m2 day 1-5 q 28 days      HPI    Patient is a 32-year-old with no past medical history who presented to the ER at [facility] on 09/20/2022 with symptoms of nausea, vomiting and headaches.  He had a CT of his head done in the ER which showed a right frontal lobe mass concerning for malignancy.  He was transferred to [facility] for neurosurgical consultation.  He underwent a right frontoparietal craniotomy with near total resection of the frontal lobe mass, pathology from which revealed high-grade glioma pending further classification.  Patient was discharged from hospital soon after his resection without any complications.    He presented to clinic on 10/05/2022 to establish care to discuss further disease management.      ECOG 0, worked in [employer] prior to his presentation, lives with his father, denies any family history of malignancy.  Former smoker, quit for many years, denies alcohol or recreational drug use.    Interval history     Today, 01/26/2024, patient denies any acute concerns today.  He reports tolerating his last cycle of Temodar well without any significant side effects.  He denies any symptoms of nausea with his last cycle.  He denies any headaches, vision changes or focal weakness.      Pathology   09/20/2022:  Right frontal hemorrhagic mass resection-high-grade glioma, grade 4 IDH1 mutated,  ATRX mutated, TP53 mutated MGMT methylation present.     Imaging     MRI Brain results dated 11/28/2023:  Chronic right anterior frontal lobe mass resection changes with no new or nodular abnormal enhancement.  No acute pathology at the  head/brain.    09/25/2023 MRI brain with and without contrast: Chronic findings as above which are similar to the most recent prior brain MRI 07/25/2023 with no new abnormality at the head/brain.       07/25/2023 MRI brain w/w/o contrast: A large surgical resection defect is again seen at the anterior right frontal lobe, with surrounding gliosis.  Relatively thin rim enhancement is identified at the surgical resection border, appearing stable in appearance. Grossly stable postsurgical resection changes in the anterior right frontal lobe.    05/23/2023 MRI brain with and without contrast:  Post resection of the right anterior frontal lobe mass with no acute intracranial pathology and no significant interval change at the resection cavity compared to the most recent MRI from March and February 2023.  Left axillary more advanced than bilateral anterior ethmoid chronic sinusitis with no paranasal fluid.    03/20/2023 MRI brain with and without contrast:  Post resection of anterior right frontal lobe mass with no acute intracranial pathology and no significant interval change compared to the most recent prior MRI brain dated 02/02/2023.    02/02/23 MRI Brain w/wo contrast: Findings consistent with large surgical resection defect in the anterior right frontal lobe. Interval evolution of the hemorrhage or proteinaceous debris within the resection cavity. Small focus of nodular enhancement at the dorsal surface of the resection.    09/21/2022 MRI brain with and without contrast:  Findings consistent with recent craniotomy in the right frontal region for resection of a hemorrhagic mass with some persistent enhancement seen along with the mesial margin of the surgical bed suggesting some residual mass.  Fluid seen in the surgical bed with subdural hematoma seen on the right side as well as with air seen on craniotomy site.  Midline shift from right to left, intraventricular hemorrhage seen in the right lateral  ventricle.    Laboratory  01/25/2024 CMP unremarkable, WBC count 3.6, hemoglobin 13.5, MCV 89.5, platelet count 171, ANC 1.6.  12/28/2023 WBC count 3.6, hemoglobin 13.4, MCV 88.9 complete count 204, ANC 1.7, CMP unremarkable.  11/30/2023:  K+ 3.4, creatinine 1.09, Total Bili 1.4 with normal AST, ALT and Alk Phos, WBC 3.32, Hgb 13.1, Hct 39.2, Plt 164,000, and ANC 1600  11/02/2023 creatinine 1.15, CMP unremarkable, WBC count 3.7, hemoglobin 12.8, MCV 90.1, platelet count 160, ANC 1.6.  10/03/2023 CMP unremarkable, WBC count 4.59, hemoglobin 13.7, MCV 88, platelet count 157, ANC 2.4, ALC 1.28  09/05/2023 CMP unremarkable, WBC count 3.63, hemoglobin 13.3, platelet count 172, ANC 1.89.  07/30/23: Cr 1.02, alb 4.2, ca 9.6, wbc 3.11, hgb 12.7, plt 84, ANC 1.5  06/27/23: Creat 0.97, abl 4.5, lft's wnl, Bili 1.4, WBC 3.11, Hgb 13.6, MCV 89.6, , ANC 1.42  05/09/2023:  Creatinine 1.38, albumin 4.3, calcium 9.7, LFTs within normal limits, WBC count 1.9, hemoglobin 13.1, MCV 87.9, platelet count 128, ANC 0.92.  02/13/23: Cr 1.30, alb 4.3, ca 9.9, LFTs WNL, TB 1.3, mag 1.9, K+ 3.5, phosphorus 3.7, folic acid 4.89, B12 247, wbc 3.23, hgb 11.9, plt 129, ANC 2.10  01/15/2023:  Creatinine 1.20, albumin 4.6, calcium 10.0, alkaline phosphatase 58, total bili 2.2, AST 19, ALT 15, potassium 3.1, WBC count 3.69, hemoglobin 13.9, platelet 251, ANC 1.89.  12/14/2022:  Creatinine 1.53, albumin 4.1, calcium 9.6, LFTs within normal limits, WBC count 3.9, hemoglobin 14.4, MCV 87, platelet count 358, ANC 2.01  11/30/22: cr 1.43, alb 4.0, ca 10, alkphos 66, LFTs WNL, wbc 5.05, hgb 14.9, plt 302, ANC 2.91  11/15/2022:  Creatinine 1.05, albumin 4.3, calcium 10.5, alk-phos 59, total bili 1.1, AST 30, ALT 54, WBC 8.6, hemoglobin 15.2, MCV 88.7, platelet count 493, ANC 5.21.  ALC 2.29.  11/06/2022:  Creatinine 1.04, albumin 4.2, calcium 10.5, alkaline phosphatase 69, total bili 1.2, AST 26, ALT 54, WBC count 7.95, hemoglobin 15.1, MCV 87.6,  platelet count 523, ANC 5.16         Past Medical History:   Diagnosis Date    Astrocytoma     Glioblastoma multiforme        Past Surgical History:   Procedure Laterality Date    CRANIOTOMY FOR EXCISION OF INTRACRANIAL TUMOR      VENTRICULOSTOMY Left 9/20/2022    Procedure: VENTRICULOSTOMY;  Surgeon: Duglas Fowler MD;  Location: [facility];  Service: Neurosurgery;  Laterality: Left;       Family History   Problem Relation Age of Onset    Hypertension Father        Social History     Socioeconomic History    Marital status: Unknown   Tobacco Use    Smoking status: Never    Smokeless tobacco: Never   Substance and Sexual Activity    Alcohol use: Never    Drug use: Never       Current Outpatient Medications   Medication Sig Dispense Refill    folic acid (FOLVITE) 1 MG tablet Take 1 tablet (1 mg total) by mouth once daily. 30 tablet 4    ondansetron (ZOFRAN) 8 MG tablet Take 1 tablet (8 mg total) by mouth every 8 (eight) hours as needed for Nausea. 30 tablet 1    temozolomide (TEMODAR) 20 MG capsule TAKE TWO 20MG CAPSULES BY MOUTH DAILY WITH ONE 250MG TEMOZOLOMIDE TO EQUAL 290MG TOTAL FOR 5 DAYS OF 28 DAY CYCLE. 10 capsule 5    temozolomide (TEMODAR) 250 MG capsule TAKE ONE (250MG) CAPSULE BY MOUTH DAILY WITH TWO (20MG) TEMOZOLOMIDE CAPSULES FOR A TOTAL  OF 290MG DAILY FOR 5 DAYS OF 28 DAY CYCLE 5 capsule 5     No current facility-administered medications for this visit.       Review of patient's allergies indicates:  No Known Allergies    Review of system  CONSTITUTIONAL: no fevers, no chills, no weight loss, no fatigue, no weakness  HEMATOLOGIC: no abnormal bleeding, no abnormal bruising, no drenching night sweats  ONCOLOGIC: no new masses or lumps  HEENT: no vision loss, no tinnitus or hearing loss, no nose bleeding, no dysphagia, no odynophagia  CVS: no chest pain, no palpitations, no dyspnea on exertion  RESP: no shortness of breath, no hemoptysis, no cough  GI: no nausea, no vomiting, no diarrhea, no constipation,  no melena, no hematochezia, no hematemesis, no abdominal pain, no increase in abdominal girth  : no dysuria, no hematuria, no hesitancy, no scrotal swelling, no discharge  INTEGUMENT: no rashes, no abnormal bruising, no nail pitting, no hyperpigmentation  NEURO: no falls, no memory loss, no paresthesias or dysesthesias, no urofecal incontinence or retention, no loss of strength on any extremity  MSK: no back pain, no new joint pain, no joint swelling  PSYCH: no suicidal or homicidal ideation, no depression, no insomnia, no anhedonia  ENDOCRINE: no heat or cold intolerance, no polyuria, no polydipsia      Physical Exam:  Blood pressure (!) 160/100, pulse 70, temperature 97.4 °F (36.3 °C), temperature source Oral, resp. rate 18, height 6' (1.829 m), weight 72.3 kg (159 lb 4.8 oz), SpO2 99 %.   ECOG PS 0  GA: AAOx3, NAD  HEENT: NCAT, PERRLA, EOMI, good dentition, moist oral mucous membranes, well-healed incision over the right frontal region  LYMPH: no cervical, axillary or supraclavicular adenopathy  CVS: s1s2 RRR, no M/R/G  RESP: CTA b/l, no crackles, no wheezes or rhonchi  ABD: soft, NT, ND, BS+, no hepatosplenomegaly  EXT: no deformities, no pedal edema  SKIN: no rashes, warm and dry  NEURO: normal mentation, strength 5/5 on all 4 extremities, no sensory deficits, cranial nerves 2-12 grossly intact      Assessment and plan    # Astrocytoma, WHO grade 4, IDH1 mutated,  ATRX mutated, TP53 mutated MGMT methylation present.   Status post gross total resection on 09/20/2022   Reviewed pathology report, noted grade 4 astrocytoma, IDH1 mutated, ATRX mutated and MGMT methylation present  Discussed with patient and his father the diagnosis, aggressive nature of disease, poor prognosis and treatment recommendations including adjuvant chemoradiation followed by chemotherapy with Temodar.    Discussed the option to explore clinical trials given patient's young age and good ECOG.  Prescription for Temodar 75 mg per m2 days  1-7 concurrently with radiation sent on his initial visit  Patient started concurrent chemo RT on 11/02/2022.  Noted updated pathology report with neuro are expanded panel and integrated diagnosis of astrocytoma, grade 4, IDH mutant.  Will plan for adjuvant temozolomide for 12 months following completion of concurrent chemo RT.   MRI brain with and without contrast in September 2023 with chronic findings which are very similar to recent MRI brain from 07/23 with no new acute abnormality  MRI brain in November 2023 with stable changes.  Patient finished 12 months of adjuvant Temodar    Plan   Patient was scheduled for his MRI on 01/29/2024  Surveillance with H&P an MRI every 2-4 mo for 3 y, then every 3-6 mo indefinitely  Plan to follow-up in 4 months with labs  Patient has a close follow-up with radiation oncology and has MRIs done with them.    A total of  30 minutes were spent in review of records, interpretation of test, coordination of care, discussion and counseling with the patient.        Portions of the record may have been created with voice recognition software. Occasional wrong-word or sound-a-like substitutions may have occurred due to the inherent limitations of voice recognition software.

## 2024-02-27 ENCOUNTER — PATIENT MESSAGE (OUTPATIENT)
Dept: ADMINISTRATIVE | Facility: OTHER | Age: 34
End: 2024-02-27
Payer: COMMERCIAL

## 2024-05-30 ENCOUNTER — OFFICE VISIT (OUTPATIENT)
Dept: HEMATOLOGY/ONCOLOGY | Facility: CLINIC | Age: 34
End: 2024-05-30
Payer: COMMERCIAL

## 2024-05-30 VITALS
DIASTOLIC BLOOD PRESSURE: 116 MMHG | HEIGHT: 72 IN | OXYGEN SATURATION: 98 % | RESPIRATION RATE: 18 BRPM | SYSTOLIC BLOOD PRESSURE: 182 MMHG | BODY MASS INDEX: 21.69 KG/M2 | HEART RATE: 69 BPM | WEIGHT: 160.13 LBS | TEMPERATURE: 97 F

## 2024-05-30 DIAGNOSIS — C71.9 GRADE IV ASTROCYTOMA: Primary | ICD-10-CM

## 2024-05-30 PROCEDURE — 99214 OFFICE O/P EST MOD 30 MIN: CPT | Mod: ,,,

## 2024-05-30 PROCEDURE — 1159F MED LIST DOCD IN RCRD: CPT | Mod: CPTII,,,

## 2024-05-30 PROCEDURE — 3008F BODY MASS INDEX DOCD: CPT | Mod: CPTII,,,

## 2024-05-30 PROCEDURE — 3077F SYST BP >= 140 MM HG: CPT | Mod: CPTII,,,

## 2024-05-30 PROCEDURE — 3080F DIAST BP >= 90 MM HG: CPT | Mod: CPTII,,,

## 2024-05-30 NOTE — PROGRESS NOTES
Referring provider: Dr. Sauceda  Reason for consult:  High-grade glioma     Diagnosis   Astrocytoma, WHO grade 4, IDH1 mutated,  ATRX mutated, TP53 mutated MGMT methylation present.       Current treatment      Treatment history     11/02/2022-12/15/2022:  Concurrent chemo RT with temozolomide 75 mg per m2 days daily  1/17/23- 12/29/2023: Temozolomide 150 mg/m2 day 1-5 q 28 days      HPI    Patient is a 32-year-old with no past medical history who presented to the ER at [facility] on 09/20/2022 with symptoms of nausea, vomiting and headaches.  He had a CT of his head done in the ER which showed a right frontal lobe mass concerning for malignancy.  He was transferred to [facility] for neurosurgical consultation.  He underwent a right frontoparietal craniotomy with near total resection of the frontal lobe mass, pathology from which revealed high-grade glioma pending further classification.  Patient was discharged from hospital soon after his resection without any complications.    He presented to clinic on 10/05/2022 to establish care to discuss further disease management.      ECOG 0, worked in [employer] prior to his presentation, lives with his father, denies any family history of malignancy.  Former smoker, quit for many years, denies alcohol or recreational drug use.    Interval history     Today, 05/30/24, patient denies any acute concerns today.   He has brain MRI scheduled for 8/5/24 w/ Rad/Onc and clinic visit to follow.  He denies any headaches, vision changes or focal weakness.  He is working nights and has not slept yet from getting off at 6 am causing his BP to be elevated.     Pathology   09/20/2022:  Right frontal hemorrhagic mass resection-high-grade glioma, grade 4 IDH1 mutated,  ATRX mutated, TP53 mutated MGMT methylation present.     Imaging     MRI Brain results dated 11/28/2023:  Chronic right anterior frontal lobe mass resection changes with no new or nodular abnormal enhancement.  No acute  pathology at the head/brain.    09/25/2023 MRI brain with and without contrast: Chronic findings as above which are similar to the most recent prior brain MRI 07/25/2023 with no new abnormality at the head/brain.       07/25/2023 MRI brain w/w/o contrast: A large surgical resection defect is again seen at the anterior right frontal lobe, with surrounding gliosis.  Relatively thin rim enhancement is identified at the surgical resection border, appearing stable in appearance. Grossly stable postsurgical resection changes in the anterior right frontal lobe.    05/23/2023 MRI brain with and without contrast:  Post resection of the right anterior frontal lobe mass with no acute intracranial pathology and no significant interval change at the resection cavity compared to the most recent MRI from March and February 2023.  Left axillary more advanced than bilateral anterior ethmoid chronic sinusitis with no paranasal fluid.    03/20/2023 MRI brain with and without contrast:  Post resection of anterior right frontal lobe mass with no acute intracranial pathology and no significant interval change compared to the most recent prior MRI brain dated 02/02/2023.    02/02/23 MRI Brain w/wo contrast: Findings consistent with large surgical resection defect in the anterior right frontal lobe. Interval evolution of the hemorrhage or proteinaceous debris within the resection cavity. Small focus of nodular enhancement at the dorsal surface of the resection.    09/21/2022 MRI brain with and without contrast:  Findings consistent with recent craniotomy in the right frontal region for resection of a hemorrhagic mass with some persistent enhancement seen along with the mesial margin of the surgical bed suggesting some residual mass.  Fluid seen in the surgical bed with subdural hematoma seen on the right side as well as with air seen on craniotomy site.  Midline shift from right to left, intraventricular hemorrhage seen in the right  lateral ventricle.    Laboratory  05/29/24: CMP unremarkable except ca 10.4, wbc 5.11, hgb 14.1, plt 346, ANC 2.23  01/25/2024 CMP unremarkable, WBC count 3.6, hemoglobin 13.5, MCV 89.5, platelet count 171, ANC 1.6.  12/28/2023 WBC count 3.6, hemoglobin 13.4, MCV 88.9 complete count 204, ANC 1.7, CMP unremarkable.  11/30/2023:  K+ 3.4, creatinine 1.09, Total Bili 1.4 with normal AST, ALT and Alk Phos, WBC 3.32, Hgb 13.1, Hct 39.2, Plt 164,000, and ANC 1600  11/02/2023 creatinine 1.15, CMP unremarkable, WBC count 3.7, hemoglobin 12.8, MCV 90.1, platelet count 160, ANC 1.6.  10/03/2023 CMP unremarkable, WBC count 4.59, hemoglobin 13.7, MCV 88, platelet count 157, ANC 2.4, ALC 1.28  09/05/2023 CMP unremarkable, WBC count 3.63, hemoglobin 13.3, platelet count 172, ANC 1.89.  07/30/23: Cr 1.02, alb 4.2, ca 9.6, wbc 3.11, hgb 12.7, plt 84, ANC 1.5  06/27/23: Creat 0.97, abl 4.5, lft's wnl, Bili 1.4, WBC 3.11, Hgb 13.6, MCV 89.6, , ANC 1.42  05/09/2023:  Creatinine 1.38, albumin 4.3, calcium 9.7, LFTs within normal limits, WBC count 1.9, hemoglobin 13.1, MCV 87.9, platelet count 128, ANC 0.92.  02/13/23: Cr 1.30, alb 4.3, ca 9.9, LFTs WNL, TB 1.3, mag 1.9, K+ 3.5, phosphorus 3.7, folic acid 4.89, B12 247, wbc 3.23, hgb 11.9, plt 129, ANC 2.10  01/15/2023:  Creatinine 1.20, albumin 4.6, calcium 10.0, alkaline phosphatase 58, total bili 2.2, AST 19, ALT 15, potassium 3.1, WBC count 3.69, hemoglobin 13.9, platelet 251, ANC 1.89.  12/14/2022:  Creatinine 1.53, albumin 4.1, calcium 9.6, LFTs within normal limits, WBC count 3.9, hemoglobin 14.4, MCV 87, platelet count 358, ANC 2.01  11/30/22: cr 1.43, alb 4.0, ca 10, alkphos 66, LFTs WNL, wbc 5.05, hgb 14.9, plt 302, ANC 2.91  11/15/2022:  Creatinine 1.05, albumin 4.3, calcium 10.5, alk-phos 59, total bili 1.1, AST 30, ALT 54, WBC 8.6, hemoglobin 15.2, MCV 88.7, platelet count 493, ANC 5.21.  ALC 2.29.  11/06/2022:  Creatinine 1.04, albumin 4.2, calcium 10.5, alkaline  phosphatase 69, total bili 1.2, AST 26, ALT 54, WBC count 7.95, hemoglobin 15.1, MCV 87.6, platelet count 523, ANC 5.16         Past Medical History:   Diagnosis Date    Astrocytoma     Glioblastoma multiforme        Past Surgical History:   Procedure Laterality Date    CRANIOTOMY FOR EXCISION OF INTRACRANIAL TUMOR      VENTRICULOSTOMY Left 9/20/2022    Procedure: VENTRICULOSTOMY;  Surgeon: Duglas Fowler MD;  Location: [facility];  Service: Neurosurgery;  Laterality: Left;       Family History   Problem Relation Name Age of Onset    Hypertension Father         Social History     Socioeconomic History    Marital status: Unknown   Tobacco Use    Smoking status: Never    Smokeless tobacco: Never   Substance and Sexual Activity    Alcohol use: Never    Drug use: Never       Current Outpatient Medications   Medication Sig Dispense Refill    folic acid (FOLVITE) 1 MG tablet Take 1 tablet (1 mg total) by mouth once daily. 30 tablet 4    ondansetron (ZOFRAN) 8 MG tablet Take 1 tablet (8 mg total) by mouth every 8 (eight) hours as needed for Nausea. 30 tablet 1    temozolomide (TEMODAR) 20 MG capsule TAKE TWO 20MG CAPSULES BY MOUTH DAILY WITH ONE 250MG TEMOZOLOMIDE TO EQUAL 290MG TOTAL FOR 5 DAYS OF 28 DAY CYCLE. 10 capsule 5    temozolomide (TEMODAR) 250 MG capsule TAKE ONE (250MG) CAPSULE BY MOUTH DAILY WITH TWO (20MG) TEMOZOLOMIDE CAPSULES FOR A TOTAL  OF 290MG DAILY FOR 5 DAYS OF 28 DAY CYCLE 5 capsule 5     No current facility-administered medications for this visit.       Review of patient's allergies indicates:  No Known Allergies    Review of system  CONSTITUTIONAL: no fevers, no chills, no weight loss, no fatigue, no weakness  HEMATOLOGIC: no abnormal bleeding, no abnormal bruising, no drenching night sweats  ONCOLOGIC: no new masses or lumps  HEENT: no vision loss, no tinnitus or hearing loss, no nose bleeding, no dysphagia, no odynophagia  CVS: no chest pain, no palpitations, no dyspnea on exertion  RESP: no  shortness of breath, no hemoptysis, no cough  GI: no nausea, no vomiting, no diarrhea, no constipation, no melena, no hematochezia, no hematemesis, no abdominal pain, no increase in abdominal girth  : no dysuria, no hematuria, no hesitancy, no scrotal swelling, no discharge  INTEGUMENT: no rashes, no abnormal bruising, no nail pitting, no hyperpigmentation  NEURO: no falls, no memory loss, no paresthesias or dysesthesias, no urofecal incontinence or retention, no loss of strength on any extremity  MSK: no back pain, no new joint pain, no joint swelling  PSYCH: no suicidal or homicidal ideation, no depression, no insomnia, no anhedonia  ENDOCRINE: no heat or cold intolerance, no polyuria, no polydipsia      Physical Exam:  Blood pressure (!) 182/116, pulse 69, temperature 97.2 °F (36.2 °C), temperature source Oral, resp. rate 18, height 6' (1.829 m), weight 72.6 kg (160 lb 1.6 oz), SpO2 98%.   ECOG PS 0  GA: AAOx3, NAD  HEENT: NCAT, PERRLA, EOMI, good dentition, moist oral mucous membranes, well-healed incision over the right frontal region  LYMPH: no cervical, axillary or supraclavicular adenopathy  CVS: s1s2 RRR, no M/R/G  RESP: CTA b/l, no crackles, no wheezes or rhonchi  ABD: soft, NT, ND, BS+, no hepatosplenomegaly  EXT: no deformities, no pedal edema  SKIN: no rashes, warm and dry  NEURO: normal mentation, strength 5/5 on all 4 extremities, no sensory deficits, cranial nerves 2-12 grossly intact      Assessment and plan    # Astrocytoma, WHO grade 4, IDH1 mutated,  ATRX mutated, TP53 mutated MGMT methylation present.   Status post gross total resection on 09/20/2022   Reviewed pathology report, noted grade 4 astrocytoma, IDH1 mutated, ATRX mutated and MGMT methylation present  Discussed with patient and his father the diagnosis, aggressive nature of disease, poor prognosis and treatment recommendations including adjuvant chemoradiation followed by chemotherapy with Temodar.    Discussed the option to explore  clinical trials given patient's young age and good ECOG.  Prescription for Temodar 75 mg per m2 days 1-7 concurrently with radiation sent on his initial visit  Patient started concurrent chemo RT on 11/02/2022.  Noted updated pathology report with neuro are expanded panel and integrated diagnosis of astrocytoma, grade 4, IDH mutant.  Will plan for adjuvant temozolomide for 12 months following completion of concurrent chemo RT.   MRI brain with and without contrast in September 2023 with chronic findings which are very similar to recent MRI brain from 07/23 with no new acute abnormality  MRI brain in November 2023 with stable changes.  Patient finished 12 months of adjuvant Temodar    Plan   Patient was scheduled for his MRI on 8/5/24  Surveillance with H&P an MRI every 2-4 mo for 3 y, then every 3-6 mo indefinitely  Plan to follow-up in 4 months with labs  Patient has a close follow-up with radiation oncology and has MRIs done with them.    A total of  30 minutes were spent in review of records, interpretation of test, coordination of care, discussion and counseling with the patient.

## 2024-09-18 DIAGNOSIS — E53.8 FOLIC ACID DEFICIENCY: ICD-10-CM

## 2024-09-18 RX ORDER — FOLIC ACID 1 MG/1
1 TABLET ORAL DAILY
Qty: 30 TABLET | Refills: 4 | Status: CANCELLED | OUTPATIENT
Start: 2024-09-18 | End: 2025-09-18

## 2024-09-20 DIAGNOSIS — E53.8 FOLIC ACID DEFICIENCY: ICD-10-CM

## 2024-09-20 RX ORDER — FOLIC ACID 1 MG/1
1 TABLET ORAL DAILY
Qty: 30 TABLET | Refills: 4 | Status: SHIPPED | OUTPATIENT
Start: 2024-09-20 | End: 2025-09-20

## 2024-09-30 ENCOUNTER — OFFICE VISIT (OUTPATIENT)
Dept: HEMATOLOGY/ONCOLOGY | Facility: CLINIC | Age: 34
End: 2024-09-30
Payer: COMMERCIAL

## 2024-09-30 VITALS
DIASTOLIC BLOOD PRESSURE: 97 MMHG | RESPIRATION RATE: 16 BRPM | HEART RATE: 81 BPM | SYSTOLIC BLOOD PRESSURE: 168 MMHG | BODY MASS INDEX: 21.56 KG/M2 | OXYGEN SATURATION: 100 % | WEIGHT: 159.19 LBS | HEIGHT: 72 IN

## 2024-09-30 DIAGNOSIS — C71.9 GRADE IV ASTROCYTOMA: Primary | ICD-10-CM

## 2024-09-30 PROCEDURE — 99213 OFFICE O/P EST LOW 20 MIN: CPT | Mod: ,,, | Performed by: STUDENT IN AN ORGANIZED HEALTH CARE EDUCATION/TRAINING PROGRAM

## 2024-09-30 PROCEDURE — 3077F SYST BP >= 140 MM HG: CPT | Mod: CPTII,,, | Performed by: STUDENT IN AN ORGANIZED HEALTH CARE EDUCATION/TRAINING PROGRAM

## 2024-09-30 PROCEDURE — 3080F DIAST BP >= 90 MM HG: CPT | Mod: CPTII,,, | Performed by: STUDENT IN AN ORGANIZED HEALTH CARE EDUCATION/TRAINING PROGRAM

## 2024-09-30 PROCEDURE — 3008F BODY MASS INDEX DOCD: CPT | Mod: CPTII,,, | Performed by: STUDENT IN AN ORGANIZED HEALTH CARE EDUCATION/TRAINING PROGRAM

## 2024-09-30 PROCEDURE — 1159F MED LIST DOCD IN RCRD: CPT | Mod: CPTII,,, | Performed by: STUDENT IN AN ORGANIZED HEALTH CARE EDUCATION/TRAINING PROGRAM

## 2024-09-30 NOTE — PROGRESS NOTES
Referring provider: Dr. Sauceda  Reason for consult:  High-grade glioma     Diagnosis   Astrocytoma, WHO grade 4, IDH1 mutated,  ATRX mutated, TP53 mutated MGMT methylation present.       Current treatment      Treatment history     11/02/2022-12/15/2022:  Concurrent chemo RT with temozolomide 75 mg per m2 days daily  1/17/23- 12/29/2023: Temozolomide 150 mg/m2 day 1-5 q 28 days      HPI    Patient is a 32-year-old with no past medical history who presented to the ER at [facility] on 09/20/2022 with symptoms of nausea, vomiting and headaches.  He had a CT of his head done in the ER which showed a right frontal lobe mass concerning for malignancy.  He was transferred to [facility] for neurosurgical consultation.  He underwent a right frontoparietal craniotomy with near total resection of the frontal lobe mass, pathology from which revealed high-grade glioma pending further classification.  Patient was discharged from hospital soon after his resection without any complications.    He presented to clinic on 10/05/2022 to establish care to discuss further disease management.      ECOG 0, worked in [employer] prior to his presentation, lives with his father, denies any family history of malignancy.  Former smoker, quit for many years, denies alcohol or recreational drug use.    Interval history     Today, 09/30/2024, patient denies any acute concerns today.  He denies any headaches, vision changes or focal weakness.  He denies any change in his appetite, new foci of pain, new medications, ER or hospital visits since his last follow-up with us with us.  He continues to work without any difficulty    Pathology   09/20/2022:  Right frontal hemorrhagic mass resection-high-grade glioma, grade 4 IDH1 mutated,  ATRX mutated, TP53 mutated MGMT methylation present.     Imaging     08/05/2024 MRI brain with and without contrast status post resection changes in the anterior right frontal lobe.  Stable in appearance.  No new  abnormal enhancement identified in the brain    MRI Brain results dated 11/28/2023:  Chronic right anterior frontal lobe mass resection changes with no new or nodular abnormal enhancement.  No acute pathology at the head/brain.    09/25/2023 MRI brain with and without contrast: Chronic findings as above which are similar to the most recent prior brain MRI 07/25/2023 with no new abnormality at the head/brain.       07/25/2023 MRI brain w/w/o contrast: A large surgical resection defect is again seen at the anterior right frontal lobe, with surrounding gliosis.  Relatively thin rim enhancement is identified at the surgical resection border, appearing stable in appearance. Grossly stable postsurgical resection changes in the anterior right frontal lobe.    05/23/2023 MRI brain with and without contrast:  Post resection of the right anterior frontal lobe mass with no acute intracranial pathology and no significant interval change at the resection cavity compared to the most recent MRI from March and February 2023.  Left axillary more advanced than bilateral anterior ethmoid chronic sinusitis with no paranasal fluid.    03/20/2023 MRI brain with and without contrast:  Post resection of anterior right frontal lobe mass with no acute intracranial pathology and no significant interval change compared to the most recent prior MRI brain dated 02/02/2023.    02/02/23 MRI Brain w/wo contrast: Findings consistent with large surgical resection defect in the anterior right frontal lobe. Interval evolution of the hemorrhage or proteinaceous debris within the resection cavity. Small focus of nodular enhancement at the dorsal surface of the resection.    09/21/2022 MRI brain with and without contrast:  Findings consistent with recent craniotomy in the right frontal region for resection of a hemorrhagic mass with some persistent enhancement seen along with the mesial margin of the surgical bed suggesting some residual mass.  Fluid seen  in the surgical bed with subdural hematoma seen on the right side as well as with air seen on craniotomy site.  Midline shift from right to left, intraventricular hemorrhage seen in the right lateral ventricle.    Laboratory    09/23/2024 creatinine 1.1, albumin 4.1, LFTs unremarkable, WBC count 4.75, hemoglobin 14.7, MCV 82.1, platelet count 326.  ANC 2.47    05/29/24: CMP unremarkable except ca 10.4, wbc 5.11, hgb 14.1, plt 346, ANC 2.23  01/25/2024 CMP unremarkable, WBC count 3.6, hemoglobin 13.5, MCV 89.5, platelet count 171, ANC 1.6.  12/28/2023 WBC count 3.6, hemoglobin 13.4, MCV 88.9 complete count 204, ANC 1.7, CMP unremarkable.  11/30/2023:  K+ 3.4, creatinine 1.09, Total Bili 1.4 with normal AST, ALT and Alk Phos, WBC 3.32, Hgb 13.1, Hct 39.2, Plt 164,000, and ANC 1600  11/02/2023 creatinine 1.15, CMP unremarkable, WBC count 3.7, hemoglobin 12.8, MCV 90.1, platelet count 160, ANC 1.6.  10/03/2023 CMP unremarkable, WBC count 4.59, hemoglobin 13.7, MCV 88, platelet count 157, ANC 2.4, ALC 1.28  09/05/2023 CMP unremarkable, WBC count 3.63, hemoglobin 13.3, platelet count 172, ANC 1.89.  07/30/23: Cr 1.02, alb 4.2, ca 9.6, wbc 3.11, hgb 12.7, plt 84, ANC 1.5  06/27/23: Creat 0.97, abl 4.5, lft's wnl, Bili 1.4, WBC 3.11, Hgb 13.6, MCV 89.6, , ANC 1.42  05/09/2023:  Creatinine 1.38, albumin 4.3, calcium 9.7, LFTs within normal limits, WBC count 1.9, hemoglobin 13.1, MCV 87.9, platelet count 128, ANC 0.92.  02/13/23: Cr 1.30, alb 4.3, ca 9.9, LFTs WNL, TB 1.3, mag 1.9, K+ 3.5, phosphorus 3.7, folic acid 4.89, B12 247, wbc 3.23, hgb 11.9, plt 129, ANC 2.10  01/15/2023:  Creatinine 1.20, albumin 4.6, calcium 10.0, alkaline phosphatase 58, total bili 2.2, AST 19, ALT 15, potassium 3.1, WBC count 3.69, hemoglobin 13.9, platelet 251, ANC 1.89.  12/14/2022:  Creatinine 1.53, albumin 4.1, calcium 9.6, LFTs within normal limits, WBC count 3.9, hemoglobin 14.4, MCV 87, platelet count 358, ANC 2.01  11/30/22: cr  1.43, alb 4.0, ca 10, alkphos 66, LFTs WNL, wbc 5.05, hgb 14.9, plt 302, ANC 2.91  11/15/2022:  Creatinine 1.05, albumin 4.3, calcium 10.5, alk-phos 59, total bili 1.1, AST 30, ALT 54, WBC 8.6, hemoglobin 15.2, MCV 88.7, platelet count 493, ANC 5.21.  ALC 2.29.  11/06/2022:  Creatinine 1.04, albumin 4.2, calcium 10.5, alkaline phosphatase 69, total bili 1.2, AST 26, ALT 54, WBC count 7.95, hemoglobin 15.1, MCV 87.6, platelet count 523, ANC 5.16         Past Medical History:   Diagnosis Date    Astrocytoma     Glioblastoma multiforme        Past Surgical History:   Procedure Laterality Date    CRANIOTOMY FOR EXCISION OF INTRACRANIAL TUMOR      VENTRICULOSTOMY Left 9/20/2022    Procedure: VENTRICULOSTOMY;  Surgeon: Duglas Fowler MD;  Location: [facility];  Service: Neurosurgery;  Laterality: Left;       Family History   Problem Relation Name Age of Onset    Hypertension Father         Social History     Socioeconomic History    Marital status: Unknown   Tobacco Use    Smoking status: Never    Smokeless tobacco: Never   Substance and Sexual Activity    Alcohol use: Never    Drug use: Never       Current Outpatient Medications   Medication Sig Dispense Refill    folic acid (FOLVITE) 1 MG tablet Take 1 tablet (1 mg total) by mouth once daily. 30 tablet 4    ondansetron (ZOFRAN) 8 MG tablet Take 1 tablet (8 mg total) by mouth every 8 (eight) hours as needed for Nausea. (Patient not taking: Reported on 9/30/2024) 30 tablet 1    temozolomide (TEMODAR) 20 MG capsule TAKE TWO 20MG CAPSULES BY MOUTH DAILY WITH ONE 250MG TEMOZOLOMIDE TO EQUAL 290MG TOTAL FOR 5 DAYS OF 28 DAY CYCLE. 10 capsule 5    temozolomide (TEMODAR) 250 MG capsule TAKE ONE (250MG) CAPSULE BY MOUTH DAILY WITH TWO (20MG) TEMOZOLOMIDE CAPSULES FOR A TOTAL  OF 290MG DAILY FOR 5 DAYS OF 28 DAY CYCLE 5 capsule 5     No current facility-administered medications for this visit.       Review of patient's allergies indicates:  No Known Allergies    Review of  system  CONSTITUTIONAL: no fevers, no chills, no weight loss, no fatigue, no weakness  HEMATOLOGIC: no abnormal bleeding, no abnormal bruising, no drenching night sweats  ONCOLOGIC: no new masses or lumps  HEENT: no vision loss, no tinnitus or hearing loss, no nose bleeding, no dysphagia, no odynophagia  CVS: no chest pain, no palpitations, no dyspnea on exertion  RESP: no shortness of breath, no hemoptysis, no cough  GI: no nausea, no vomiting, no diarrhea, no constipation, no melena, no hematochezia, no hematemesis, no abdominal pain, no increase in abdominal girth  : no dysuria, no hematuria, no hesitancy, no scrotal swelling, no discharge  INTEGUMENT: no rashes, no abnormal bruising, no nail pitting, no hyperpigmentation  NEURO: no falls, no memory loss, no paresthesias or dysesthesias, no urofecal incontinence or retention, no loss of strength on any extremity  MSK: no back pain, no new joint pain, no joint swelling  PSYCH: no suicidal or homicidal ideation, no depression, no insomnia, no anhedonia  ENDOCRINE: no heat or cold intolerance, no polyuria, no polydipsia      Physical Exam:  Blood pressure (!) 168/97, pulse 81, resp. rate 16, height 6' (1.829 m), weight 72.2 kg (159 lb 3.2 oz), SpO2 100%.     GA: AAOx3, NAD  HEENT:  moist oral mucous membranes  RESP:  Equal chest rise, no accessory muscle use  EXT: no deformities, no pedal edema  SKIN: no rashes, warm and dry  NEURO: normal mentation, no gross neuro deficits      Assessment and plan    # Astrocytoma, WHO grade 4, IDH1 mutated,  ATRX mutated, TP53 mutated MGMT methylation present.   Status post gross total resection on 09/20/2022   Reviewed pathology report, noted grade 4 astrocytoma, IDH1 mutated, ATRX mutated and MGMT methylation present  Discussed with patient and his father the diagnosis, aggressive nature of disease, poor prognosis and treatment recommendations including adjuvant chemoradiation followed by chemotherapy with Temodar.     Discussed the option to explore clinical trials given patient's young age and good ECOG.  Prescription for Temodar 75 mg per m2 days 1-7 concurrently with radiation sent on his initial visit  Patient started concurrent chemo RT on 11/02/2022.  Noted updated pathology report with neuro are expanded panel and integrated diagnosis of astrocytoma, grade 4, IDH mutant.  Will plan for adjuvant temozolomide for 12 months following completion of concurrent chemo RT.   MRI brain with and without contrast in September 2023 with chronic findings which are very similar to recent MRI brain from 07/23 with no new acute abnormality  MRI brain in November 2023 with stable changes.  Patient finished 12 months of adjuvant Temodar in December 2023  Surveillance with H&P and MRI every 2-4 mo for 3 y, then every 3-6 mo indefinitely  MRI brain with and without contrast in August 2024 with stable changes    Plan   Plan to follow-up in 4 months with repeat labs.  Patient has a close follow-up with radiation oncology and has MRIs done with them.        Portions of the record may have been created with voice recognition software. Occasional wrong-word or sound-a-like substitutions may have occurred due to the inherent limitations of voice recognition software.

## 2025-02-11 DIAGNOSIS — E53.8 FOLIC ACID DEFICIENCY: ICD-10-CM

## 2025-02-11 RX ORDER — FOLIC ACID 1 MG/1
1 TABLET ORAL DAILY
Qty: 30 TABLET | Refills: 3 | Status: SHIPPED | OUTPATIENT
Start: 2025-02-11 | End: 2025-06-11

## 2025-02-13 ENCOUNTER — OFFICE VISIT (OUTPATIENT)
Dept: HEMATOLOGY/ONCOLOGY | Facility: CLINIC | Age: 35
End: 2025-02-13
Payer: COMMERCIAL

## 2025-02-13 VITALS
SYSTOLIC BLOOD PRESSURE: 159 MMHG | HEART RATE: 66 BPM | DIASTOLIC BLOOD PRESSURE: 91 MMHG | RESPIRATION RATE: 16 BRPM | WEIGHT: 161 LBS | OXYGEN SATURATION: 99 % | BODY MASS INDEX: 21.81 KG/M2 | TEMPERATURE: 98 F | HEIGHT: 72 IN

## 2025-02-13 DIAGNOSIS — C71.9 GRADE IV ASTROCYTOMA: Primary | ICD-10-CM

## 2025-02-13 NOTE — PROGRESS NOTES
Referring provider: Dr. Sauceda  Reason for consult:  High-grade glioma     Diagnosis   Astrocytoma, WHO grade 4, IDH1 mutated,  ATRX mutated, TP53 mutated MGMT methylation present.       Current treatment      Treatment history     11/02/2022-12/15/2022:  Concurrent chemo RT with temozolomide 75 mg per m2 days daily  1/17/23- 12/29/2023: Temozolomide 150 mg/m2 day 1-5 q 28 days      HPI    Patient is a 32-year-old with no past medical history who presented to the ER at [facility] on 09/20/2022 with symptoms of nausea, vomiting and headaches.  He had a CT of his head done in the ER which showed a right frontal lobe mass concerning for malignancy.  He was transferred to [facility] for neurosurgical consultation.  He underwent a right frontoparietal craniotomy with near total resection of the frontal lobe mass, pathology from which revealed high-grade glioma pending further classification.  Patient was discharged from hospital soon after his resection without any complications.    He presented to clinic on 10/05/2022 to establish care to discuss further disease management.      ECOG 0, worked in [employer] prior to his presentation, lives with his father, denies any family history of malignancy.  Former smoker, quit for many years, denies alcohol or recreational drug use.    Interval history     Today, 02/13/2025, patient denies any acute concerns today.  He denies any headaches, vision changes or focal weakness.  He denies any change in his appetite, new foci of pain, new medications, ER or hospital visits since his last follow-up with us with us.  He continues to work without any difficulty    Pathology   09/20/2022:  Right frontal hemorrhagic mass resection-high-grade glioma, grade 4 IDH1 mutated,  ATRX mutated, TP53 mutated MGMT methylation present.     Imaging     02/11/2025:  MRI brain with and without contrast Chronic changes of right frontal craniotomy and right frontal lobe mass resection with little  interval change since the most recent MRI 11/11/2024. Small area of thin enhancement at the posterior margin of the resection cavity along the lateral margin of the anterior right lateral ventricle is more conspicuous in the interval. Recommend attention to this area on subsequent MRI exams. 08/05/2024 MRI brain with and without contrast status post resection changes in the anterior right frontal lobe.  Stable in appearance.  No new abnormal enhancement identified in the brain    MRI Brain results dated 11/28/2023:  Chronic right anterior frontal lobe mass resection changes with no new or nodular abnormal enhancement.  No acute pathology at the head/brain.    09/25/2023 MRI brain with and without contrast: Chronic findings as above which are similar to the most recent prior brain MRI 07/25/2023 with no new abnormality at the head/brain.       07/25/2023 MRI brain w/w/o contrast: A large surgical resection defect is again seen at the anterior right frontal lobe, with surrounding gliosis.  Relatively thin rim enhancement is identified at the surgical resection border, appearing stable in appearance. Grossly stable postsurgical resection changes in the anterior right frontal lobe.    05/23/2023 MRI brain with and without contrast:  Post resection of the right anterior frontal lobe mass with no acute intracranial pathology and no significant interval change at the resection cavity compared to the most recent MRI from March and February 2023.  Left axillary more advanced than bilateral anterior ethmoid chronic sinusitis with no paranasal fluid.    03/20/2023 MRI brain with and without contrast:  Post resection of anterior right frontal lobe mass with no acute intracranial pathology and no significant interval change compared to the most recent prior MRI brain dated 02/02/2023.    02/02/23 MRI Brain w/wo contrast: Findings consistent with large surgical resection defect in the anterior right frontal lobe. Interval  evolution of the hemorrhage or proteinaceous debris within the resection cavity. Small focus of nodular enhancement at the dorsal surface of the resection.    09/21/2022 MRI brain with and without contrast:  Findings consistent with recent craniotomy in the right frontal region for resection of a hemorrhagic mass with some persistent enhancement seen along with the mesial margin of the surgical bed suggesting some residual mass.  Fluid seen in the surgical bed with subdural hematoma seen on the right side as well as with air seen on craniotomy site.  Midline shift from right to left, intraventricular hemorrhage seen in the right lateral ventricle.    Laboratory    01/27/25: CMP unremarkable except TB 1.6, CBC WNL  09/23/2024 creatinine 1.1, albumin 4.1, LFTs unremarkable, WBC count 4.75, hemoglobin 14.7, MCV 82.1, platelet count 326.  ANC 2.47    05/29/24: CMP unremarkable except ca 10.4, wbc 5.11, hgb 14.1, plt 346, ANC 2.23  01/25/2024 CMP unremarkable, WBC count 3.6, hemoglobin 13.5, MCV 89.5, platelet count 171, ANC 1.6.  12/28/2023 WBC count 3.6, hemoglobin 13.4, MCV 88.9 complete count 204, ANC 1.7, CMP unremarkable.  11/30/2023:  K+ 3.4, creatinine 1.09, Total Bili 1.4 with normal AST, ALT and Alk Phos, WBC 3.32, Hgb 13.1, Hct 39.2, Plt 164,000, and ANC 1600  11/02/2023 creatinine 1.15, CMP unremarkable, WBC count 3.7, hemoglobin 12.8, MCV 90.1, platelet count 160, ANC 1.6.  10/03/2023 CMP unremarkable, WBC count 4.59, hemoglobin 13.7, MCV 88, platelet count 157, ANC 2.4, ALC 1.28  09/05/2023 CMP unremarkable, WBC count 3.63, hemoglobin 13.3, platelet count 172, ANC 1.89.  07/30/23: Cr 1.02, alb 4.2, ca 9.6, wbc 3.11, hgb 12.7, plt 84, ANC 1.5  06/27/23: Creat 0.97, abl 4.5, lft's wnl, Bili 1.4, WBC 3.11, Hgb 13.6, MCV 89.6, , ANC 1.42  05/09/2023:  Creatinine 1.38, albumin 4.3, calcium 9.7, LFTs within normal limits, WBC count 1.9, hemoglobin 13.1, MCV 87.9, platelet count 128, ANC 0.92.  02/13/23: Cr  1.30, alb 4.3, ca 9.9, LFTs WNL, TB 1.3, mag 1.9, K+ 3.5, phosphorus 3.7, folic acid 4.89, B12 247, wbc 3.23, hgb 11.9, plt 129, ANC 2.10  01/15/2023:  Creatinine 1.20, albumin 4.6, calcium 10.0, alkaline phosphatase 58, total bili 2.2, AST 19, ALT 15, potassium 3.1, WBC count 3.69, hemoglobin 13.9, platelet 251, ANC 1.89.  12/14/2022:  Creatinine 1.53, albumin 4.1, calcium 9.6, LFTs within normal limits, WBC count 3.9, hemoglobin 14.4, MCV 87, platelet count 358, ANC 2.01  11/30/22: cr 1.43, alb 4.0, ca 10, alkphos 66, LFTs WNL, wbc 5.05, hgb 14.9, plt 302, ANC 2.91  11/15/2022:  Creatinine 1.05, albumin 4.3, calcium 10.5, alk-phos 59, total bili 1.1, AST 30, ALT 54, WBC 8.6, hemoglobin 15.2, MCV 88.7, platelet count 493, ANC 5.21.  ALC 2.29.  11/06/2022:  Creatinine 1.04, albumin 4.2, calcium 10.5, alkaline phosphatase 69, total bili 1.2, AST 26, ALT 54, WBC count 7.95, hemoglobin 15.1, MCV 87.6, platelet count 523, ANC 5.16         Past Medical History:   Diagnosis Date    Astrocytoma     Glioblastoma multiforme        Past Surgical History:   Procedure Laterality Date    CRANIOTOMY FOR EXCISION OF INTRACRANIAL TUMOR      VENTRICULOSTOMY Left 9/20/2022    Procedure: VENTRICULOSTOMY;  Surgeon: Duglas Fowler MD;  Location: [facility];  Service: Neurosurgery;  Laterality: Left;       Family History   Problem Relation Name Age of Onset    Hypertension Father         Social History     Socioeconomic History    Marital status: Unknown   Tobacco Use    Smoking status: Never    Smokeless tobacco: Never   Substance and Sexual Activity    Alcohol use: Never    Drug use: Never       Current Outpatient Medications   Medication Sig Dispense Refill    folic acid (FOLVITE) 1 MG tablet Take 1 tablet (1 mg total) by mouth once daily. 30 tablet 3     No current facility-administered medications for this visit.       Review of patient's allergies indicates:  No Known Allergies    Review of system  CONSTITUTIONAL: no fevers, no  chills, no weight loss, no fatigue, no weakness  HEMATOLOGIC: no abnormal bleeding, no abnormal bruising, no drenching night sweats  ONCOLOGIC: no new masses or lumps  HEENT: no vision loss, no tinnitus or hearing loss, no nose bleeding, no dysphagia, no odynophagia  CVS: no chest pain, no palpitations, no dyspnea on exertion  RESP: no shortness of breath, no hemoptysis, no cough  GI: no nausea, no vomiting, no diarrhea, no constipation, no melena, no hematochezia, no hematemesis, no abdominal pain, no increase in abdominal girth  : no dysuria, no hematuria, no hesitancy, no scrotal swelling, no discharge  INTEGUMENT: no rashes, no abnormal bruising, no nail pitting, no hyperpigmentation  NEURO: no falls, no memory loss, no paresthesias or dysesthesias, no urofecal incontinence or retention, no loss of strength on any extremity  MSK: no back pain, no new joint pain, no joint swelling  PSYCH: no suicidal or homicidal ideation, no depression, no insomnia, no anhedonia  ENDOCRINE: no heat or cold intolerance, no polyuria, no polydipsia      Physical Exam:  Blood pressure (!) 159/91, pulse 66, temperature 97.8 °F (36.6 °C), temperature source Oral, resp. rate 16, height 6' (1.829 m), weight 73 kg (161 lb), SpO2 99%.     GA: AAOx3, NAD  HEENT:  moist oral mucous membranes  RESP:  Equal chest rise, no accessory muscle use  EXT: no deformities, no pedal edema  SKIN: no rashes, warm and dry  NEURO: normal mentation, no gross neuro deficits      Assessment and plan    # Astrocytoma, WHO grade 4, IDH1 mutated,  ATRX mutated, TP53 mutated MGMT methylation present.   Status post gross total resection on 09/20/2022   Reviewed pathology report, noted grade 4 astrocytoma, IDH1 mutated, ATRX mutated and MGMT methylation present  Discussed with patient and his father the diagnosis, aggressive nature of disease, poor prognosis and treatment recommendations including adjuvant chemoradiation followed by chemotherapy with Temodar.     Discussed the option to explore clinical trials given patient's young age and good ECOG.  Prescription for Temodar 75 mg per m2 days 1-7 concurrently with radiation sent on his initial visit  Patient started concurrent chemo RT on 11/02/2022.  Noted updated pathology report with neuro are expanded panel and integrated diagnosis of astrocytoma, grade 4, IDH mutant.  Will plan for adjuvant temozolomide for 12 months following completion of concurrent chemo RT.   MRI brain with and without contrast in September 2023 with chronic findings which are very similar to recent MRI brain from 07/23 with no new acute abnormality  MRI brain in November 2023 with stable changes.  Patient finished 12 months of adjuvant Temodar in December 2023  Surveillance with H&P and MRI every 2-4 mo for 3 y, then every 3-6 mo indefinitely  MRI brain with and without contrast in August 2024 with stable changes    Plan   Plan to follow-up in 4 months with repeat labs.  Patient has a close follow-up with radiation oncology and has MRIs done with them.

## 2025-06-04 DIAGNOSIS — E53.8 FOLIC ACID DEFICIENCY: ICD-10-CM

## 2025-06-04 RX ORDER — FOLIC ACID 1 MG/1
1000 TABLET ORAL DAILY
Qty: 30 TABLET | Refills: 0 | Status: SHIPPED | OUTPATIENT
Start: 2025-06-04 | End: 2025-07-04

## 2025-06-13 ENCOUNTER — OFFICE VISIT (OUTPATIENT)
Dept: HEMATOLOGY/ONCOLOGY | Facility: CLINIC | Age: 35
End: 2025-06-13
Payer: COMMERCIAL

## 2025-06-13 VITALS
TEMPERATURE: 98 F | OXYGEN SATURATION: 99 % | WEIGHT: 164.69 LBS | DIASTOLIC BLOOD PRESSURE: 96 MMHG | SYSTOLIC BLOOD PRESSURE: 162 MMHG | RESPIRATION RATE: 14 BRPM | HEART RATE: 75 BPM | HEIGHT: 72 IN | BODY MASS INDEX: 22.31 KG/M2

## 2025-06-13 DIAGNOSIS — C71.9 GRADE IV ASTROCYTOMA: ICD-10-CM

## 2025-06-13 NOTE — PROGRESS NOTES
Referring provider: Dr. Sauceda  Reason for consult:  High-grade glioma     Diagnosis   Astrocytoma, WHO grade 4, IDH1 mutated,  ATRX mutated, TP53 mutated MGMT methylation present.       Current treatment: N/A      Treatment history     11/02/2022-12/15/2022:  Concurrent chemo RT with temozolomide 75 mg per m2 days daily  1/17/23- 12/29/2023: Temozolomide 150 mg/m2 day 1-5 q 28 days      HPI    Patient is a 35-year-old with no past medical history who presented to the ER at [facility] on 09/20/2022 with symptoms of nausea, vomiting and headaches.  He had a CT of his head done in the ER which showed a right frontal lobe mass concerning for malignancy.  He was transferred to [facility] for neurosurgical consultation.  He underwent a right frontoparietal craniotomy with near total resection of the frontal lobe mass, pathology from which revealed high-grade glioma pending further classification.  Patient was discharged from hospital soon after his resection without any complications.    He presented to clinic on 10/05/2022 to establish care to discuss further disease management.      ECOG 0, worked in [employer] prior to his presentation, lives with his father, denies any family history of malignancy.  Former smoker, quit for many years, denies alcohol or recreational drug use.    Interval history     Today, 06/13/2025, patient denies any acute concerns today.  He denies any headaches, vision changes or focal weakness.  He denies any change in his appetite, new foci of pain, new medications, ER or hospital visits since his last follow-up with us with us.  He continues to work without any difficulty    Pathology   09/20/2022:  Right frontal hemorrhagic mass resection-high-grade glioma, grade 4 IDH1 mutated,  ATRX mutated, TP53 mutated MGMT methylation present.     Imaging     04/14/2025 MRI brain with and without contrast stable post resection changes of the anterior right frontal lobe.  10 mm diameter focus off  curvilinear enhancement at subependymal surface of the right lateral ventricle anterior horn, similar in appearance when compared to the prior study.  Continued short interval follow-up advised      02/11/2025:  MRI brain with and without contrast Chronic changes of right frontal craniotomy and right frontal lobe mass resection with little interval change since the most recent MRI 11/11/2024. Small area of thin enhancement at the posterior margin of the resection cavity along the lateral margin of the anterior right lateral ventricle is more conspicuous in the interval. Recommend attention to this area on subsequent MRI exams. 08/05/2024 MRI brain with and without contrast status post resection changes in the anterior right frontal lobe.  Stable in appearance.  No new abnormal enhancement identified in the brain    MRI Brain results dated 11/28/2023:  Chronic right anterior frontal lobe mass resection changes with no new or nodular abnormal enhancement.  No acute pathology at the head/brain.    09/25/2023 MRI brain with and without contrast: Chronic findings as above which are similar to the most recent prior brain MRI 07/25/2023 with no new abnormality at the head/brain.       07/25/2023 MRI brain w/w/o contrast: A large surgical resection defect is again seen at the anterior right frontal lobe, with surrounding gliosis.  Relatively thin rim enhancement is identified at the surgical resection border, appearing stable in appearance. Grossly stable postsurgical resection changes in the anterior right frontal lobe.    05/23/2023 MRI brain with and without contrast:  Post resection of the right anterior frontal lobe mass with no acute intracranial pathology and no significant interval change at the resection cavity compared to the most recent MRI from March and February 2023.  Left axillary more advanced than bilateral anterior ethmoid chronic sinusitis with no paranasal fluid.    03/20/2023 MRI brain with and without  contrast:  Post resection of anterior right frontal lobe mass with no acute intracranial pathology and no significant interval change compared to the most recent prior MRI brain dated 02/02/2023.    02/02/23 MRI Brain w/wo contrast: Findings consistent with large surgical resection defect in the anterior right frontal lobe. Interval evolution of the hemorrhage or proteinaceous debris within the resection cavity. Small focus of nodular enhancement at the dorsal surface of the resection.    09/21/2022 MRI brain with and without contrast:  Findings consistent with recent craniotomy in the right frontal region for resection of a hemorrhagic mass with some persistent enhancement seen along with the mesial margin of the surgical bed suggesting some residual mass.  Fluid seen in the surgical bed with subdural hematoma seen on the right side as well as with air seen on craniotomy site.  Midline shift from right to left, intraventricular hemorrhage seen in the right lateral ventricle.    Laboratory    01/27/25: CMP unremarkable except TB 1.6, CBC WNL  09/23/2024 creatinine 1.1, albumin 4.1, LFTs unremarkable, WBC count 4.75, hemoglobin 14.7, MCV 82.1, platelet count 326.  ANC 2.47    05/29/24: CMP unremarkable except ca 10.4, wbc 5.11, hgb 14.1, plt 346, ANC 2.23  01/25/2024 CMP unremarkable, WBC count 3.6, hemoglobin 13.5, MCV 89.5, platelet count 171, ANC 1.6.  12/28/2023 WBC count 3.6, hemoglobin 13.4, MCV 88.9 complete count 204, ANC 1.7, CMP unremarkable.  11/30/2023:  K+ 3.4, creatinine 1.09, Total Bili 1.4 with normal AST, ALT and Alk Phos, WBC 3.32, Hgb 13.1, Hct 39.2, Plt 164,000, and ANC 1600  11/02/2023 creatinine 1.15, CMP unremarkable, WBC count 3.7, hemoglobin 12.8, MCV 90.1, platelet count 160, ANC 1.6.  10/03/2023 CMP unremarkable, WBC count 4.59, hemoglobin 13.7, MCV 88, platelet count 157, ANC 2.4, ALC 1.28  09/05/2023 CMP unremarkable, WBC count 3.63, hemoglobin 13.3, platelet count 172, ANC  1.89.  07/30/23: Cr 1.02, alb 4.2, ca 9.6, wbc 3.11, hgb 12.7, plt 84, ANC 1.5  06/27/23: Creat 0.97, abl 4.5, lft's wnl, Bili 1.4, WBC 3.11, Hgb 13.6, MCV 89.6, , ANC 1.42  05/09/2023:  Creatinine 1.38, albumin 4.3, calcium 9.7, LFTs within normal limits, WBC count 1.9, hemoglobin 13.1, MCV 87.9, platelet count 128, ANC 0.92.  02/13/23: Cr 1.30, alb 4.3, ca 9.9, LFTs WNL, TB 1.3, mag 1.9, K+ 3.5, phosphorus 3.7, folic acid 4.89, B12 247, wbc 3.23, hgb 11.9, plt 129, ANC 2.10  01/15/2023:  Creatinine 1.20, albumin 4.6, calcium 10.0, alkaline phosphatase 58, total bili 2.2, AST 19, ALT 15, potassium 3.1, WBC count 3.69, hemoglobin 13.9, platelet 251, ANC 1.89.  12/14/2022:  Creatinine 1.53, albumin 4.1, calcium 9.6, LFTs within normal limits, WBC count 3.9, hemoglobin 14.4, MCV 87, platelet count 358, ANC 2.01  11/30/22: cr 1.43, alb 4.0, ca 10, alkphos 66, LFTs WNL, wbc 5.05, hgb 14.9, plt 302, ANC 2.91  11/15/2022:  Creatinine 1.05, albumin 4.3, calcium 10.5, alk-phos 59, total bili 1.1, AST 30, ALT 54, WBC 8.6, hemoglobin 15.2, MCV 88.7, platelet count 493, ANC 5.21.  ALC 2.29.  11/06/2022:  Creatinine 1.04, albumin 4.2, calcium 10.5, alkaline phosphatase 69, total bili 1.2, AST 26, ALT 54, WBC count 7.95, hemoglobin 15.1, MCV 87.6, platelet count 523, ANC 5.16         Past Medical History:   Diagnosis Date    Astrocytoma     Glioblastoma multiforme        Past Surgical History:   Procedure Laterality Date    CRANIOTOMY FOR EXCISION OF INTRACRANIAL TUMOR      VENTRICULOSTOMY Left 9/20/2022    Procedure: VENTRICULOSTOMY;  Surgeon: Duglas Fowler MD;  Location: [facility];  Service: Neurosurgery;  Laterality: Left;       Family History   Problem Relation Name Age of Onset    Hypertension Father         Social History     Socioeconomic History    Marital status: Unknown   Tobacco Use    Smoking status: Never    Smokeless tobacco: Never   Substance and Sexual Activity    Alcohol use: Never    Drug use: Never        Current Outpatient Medications   Medication Sig Dispense Refill    folic acid (FOLVITE) 1 MG tablet Take 1 tablet (1,000 mcg total) by mouth once daily. 30 tablet 0     No current facility-administered medications for this visit.       Review of patient's allergies indicates:  No Known Allergies    Review of system  CONSTITUTIONAL: no fevers, no chills, no weight loss, no fatigue, no weakness  HEMATOLOGIC: no abnormal bleeding, no abnormal bruising, no drenching night sweats  ONCOLOGIC: no new masses or lumps  HEENT: no vision loss, no tinnitus or hearing loss, no nose bleeding, no dysphagia, no odynophagia  CVS: no chest pain, no palpitations, no dyspnea on exertion  RESP: no shortness of breath, no hemoptysis, no cough  GI: no nausea, no vomiting, no diarrhea, no constipation, no melena, no hematochezia, no hematemesis, no abdominal pain, no increase in abdominal girth  : no dysuria, no hematuria, no hesitancy, no scrotal swelling, no discharge  INTEGUMENT: no rashes, no abnormal bruising, no nail pitting, no hyperpigmentation  NEURO: no falls, no memory loss, no paresthesias or dysesthesias, no urofecal incontinence or retention, no loss of strength on any extremity  MSK: no back pain, no new joint pain, no joint swelling  PSYCH: no suicidal or homicidal ideation, no depression, no insomnia, no anhedonia  ENDOCRINE: no heat or cold intolerance, no polyuria, no polydipsia      Physical Exam:  Blood pressure (!) 162/96, pulse 75, temperature 97.8 °F (36.6 °C), temperature source Oral, resp. rate 14, height 6' (1.829 m), weight 74.7 kg (164 lb 11.2 oz), SpO2 99%.     GA: AAOx3, NAD  HEENT:  moist oral mucous membranes  RESP:  Equal chest rise, no accessory muscle use  EXT: no deformities, no pedal edema  SKIN: no rashes, warm and dry  NEURO: normal mentation, no gross neuro deficits      Assessment and plan    # Astrocytoma, WHO grade 4, IDH1 mutated,  ATRX mutated, TP53 mutated MGMT methylation present.    Status post gross total resection on 09/20/2022   Reviewed pathology report, noted grade 4 astrocytoma, IDH1 mutated, ATRX mutated and MGMT methylation present  Discussed with patient and his father the diagnosis, aggressive nature of disease, poor prognosis and treatment recommendations including adjuvant chemoradiation followed by chemotherapy with Temodar.    Discussed the option to explore clinical trials given patient's young age and good ECOG.  Prescription for Temodar 75 mg per m2 days 1-7 concurrently with radiation sent on his initial visit  Patient started concurrent chemo RT on 11/02/2022.  Noted updated pathology report with neuro are expanded panel and integrated diagnosis of astrocytoma, grade 4, IDH mutant.  Will plan for adjuvant temozolomide for 12 months following completion of concurrent chemo RT.   MRI brain with and without contrast in September 2023 with chronic findings which are very similar to recent MRI brain from 07/23 with no new acute abnormality  MRI brain in November 2023 with stable changes.  Patient finished 12 months of adjuvant Temodar in December 2023  Surveillance with H&P and MRI every 2-4 mo for 3 y, then every 3-6 mo indefinitely  MRI brain with and without contrast in August 2024 with stable changes  MRI brain with and without contrast in April 2025 with stable changes      Plan   Plan to follow-up in 6 months with repeat labs.  Patient has a close follow-up with radiation oncology and has MRIs done with them.    Portions of the record may have been created with voice recognition software. Occasional wrong-word or sound-a-like substitutions may have occurred due to the inherent limitations of voice recognition software.

## 2025-07-02 DIAGNOSIS — E53.8 FOLIC ACID DEFICIENCY: ICD-10-CM

## 2025-07-02 RX ORDER — FOLIC ACID 1 MG/1
1000 TABLET ORAL DAILY
Qty: 30 TABLET | Refills: 0 | Status: SHIPPED | OUTPATIENT
Start: 2025-07-02 | End: 2025-08-01

## 2025-07-24 ENCOUNTER — TELEPHONE (OUTPATIENT)
Dept: NEUROSURGERY | Facility: CLINIC | Age: 35
End: 2025-07-24
Payer: COMMERCIAL

## 2025-07-24 DIAGNOSIS — C71.9 MALIGNANT NEOPLASM OF BRAIN, UNSPECIFIED: Primary | ICD-10-CM

## 2025-07-24 DIAGNOSIS — C71.1 MALIGNANT NEOPLASM OF FRONTAL LOBE: ICD-10-CM

## 2025-07-24 NOTE — TELEPHONE ENCOUNTER
Patient referred to Dr. Grant by Dr. 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 Fowler, last seen in clinic by Dr. Fowler on 9/25/23.    MRI Brain W WO 7/21/25. Impression:  1 mm diameter focus of curvilinear enhancement at subependymal surface of the right lateral ventricle anterior horn, slowly enlarging when compared to recent prior exams.  Otherwise stable post treatment changes in the brain.    Per referring 7/24/25 progress note:      Please review and advise.

## 2025-07-24 NOTE — TELEPHONE ENCOUNTER
Dr. Gastelum.  I am thinking we need to see this patient quick.  I can see him and you pop in if there is nothing open in your schedule.    Dr. Fowler note 9/25/2023  He is s/p  right frontoparietal craniotomy for resection of hemorrhagic tumor/glioblastoma that was done on 9/20/22.  He was treated with radiation and Temodar from 11/2/22 to 12/15/22.  He is under the care of Dr. Renee with oncology.  He continues treatment with Temodar.  He is to remain on this until sometime in January.  He reports to be doing well today.  He denies any new or worsening symptoms.  He remains on Keppra.  He has not had any seizures.     He was supposed to follow up in 12/2023.  I am not sure how he was lost to follow up.

## 2025-08-06 ENCOUNTER — OFFICE VISIT (OUTPATIENT)
Dept: NEUROSURGERY | Facility: CLINIC | Age: 35
End: 2025-08-06
Payer: COMMERCIAL

## 2025-08-06 VITALS
HEIGHT: 72 IN | DIASTOLIC BLOOD PRESSURE: 86 MMHG | WEIGHT: 163 LBS | RESPIRATION RATE: 16 BRPM | HEART RATE: 71 BPM | SYSTOLIC BLOOD PRESSURE: 126 MMHG | BODY MASS INDEX: 22.08 KG/M2

## 2025-08-06 DIAGNOSIS — C71.9 GBM (GLIOBLASTOMA MULTIFORME): ICD-10-CM

## 2025-08-06 DIAGNOSIS — C71.9 GRADE IV ASTROCYTOMA: Primary | ICD-10-CM

## 2025-08-06 DIAGNOSIS — C71.1 MALIGNANT NEOPLASM OF FRONTAL LOBE: ICD-10-CM

## 2025-08-06 PROCEDURE — 1159F MED LIST DOCD IN RCRD: CPT | Mod: CPTII,,, | Performed by: STUDENT IN AN ORGANIZED HEALTH CARE EDUCATION/TRAINING PROGRAM

## 2025-08-06 PROCEDURE — 3008F BODY MASS INDEX DOCD: CPT | Mod: CPTII,,, | Performed by: STUDENT IN AN ORGANIZED HEALTH CARE EDUCATION/TRAINING PROGRAM

## 2025-08-06 PROCEDURE — 3079F DIAST BP 80-89 MM HG: CPT | Mod: CPTII,,, | Performed by: STUDENT IN AN ORGANIZED HEALTH CARE EDUCATION/TRAINING PROGRAM

## 2025-08-06 PROCEDURE — 99214 OFFICE O/P EST MOD 30 MIN: CPT | Mod: ,,, | Performed by: STUDENT IN AN ORGANIZED HEALTH CARE EDUCATION/TRAINING PROGRAM

## 2025-08-06 PROCEDURE — 3074F SYST BP LT 130 MM HG: CPT | Mod: CPTII,,, | Performed by: STUDENT IN AN ORGANIZED HEALTH CARE EDUCATION/TRAINING PROGRAM

## 2025-08-06 PROCEDURE — 1160F RVW MEDS BY RX/DR IN RCRD: CPT | Mod: CPTII,,, | Performed by: STUDENT IN AN ORGANIZED HEALTH CARE EDUCATION/TRAINING PROGRAM

## 2025-08-08 DIAGNOSIS — E53.8 FOLIC ACID DEFICIENCY: ICD-10-CM

## 2025-08-11 RX ORDER — FOLIC ACID 1 MG/1
1000 TABLET ORAL
Qty: 30 TABLET | Refills: 0 | Status: SHIPPED | OUTPATIENT
Start: 2025-08-11

## 2025-08-15 ENCOUNTER — TELEPHONE (OUTPATIENT)
Dept: NEUROSURGERY | Facility: CLINIC | Age: 35
End: 2025-08-15
Payer: COMMERCIAL

## 2025-08-19 ENCOUNTER — TELEPHONE (OUTPATIENT)
Dept: NEUROSURGERY | Facility: CLINIC | Age: 35
End: 2025-08-19
Payer: COMMERCIAL

## 2025-08-25 ENCOUNTER — OFFICE VISIT (OUTPATIENT)
Dept: HEMATOLOGY/ONCOLOGY | Facility: CLINIC | Age: 35
End: 2025-08-25
Payer: COMMERCIAL

## 2025-08-25 VITALS
BODY MASS INDEX: 21.54 KG/M2 | OXYGEN SATURATION: 99 % | DIASTOLIC BLOOD PRESSURE: 91 MMHG | TEMPERATURE: 98 F | SYSTOLIC BLOOD PRESSURE: 143 MMHG | WEIGHT: 159 LBS | HEIGHT: 72 IN | RESPIRATION RATE: 14 BRPM | HEART RATE: 77 BPM

## 2025-08-25 DIAGNOSIS — C71.9 GRADE IV ASTROCYTOMA: Primary | ICD-10-CM

## 2025-08-25 PROCEDURE — 3080F DIAST BP >= 90 MM HG: CPT | Mod: CPTII,,, | Performed by: STUDENT IN AN ORGANIZED HEALTH CARE EDUCATION/TRAINING PROGRAM

## 2025-08-25 PROCEDURE — 3008F BODY MASS INDEX DOCD: CPT | Mod: CPTII,,, | Performed by: STUDENT IN AN ORGANIZED HEALTH CARE EDUCATION/TRAINING PROGRAM

## 2025-08-25 PROCEDURE — 3077F SYST BP >= 140 MM HG: CPT | Mod: CPTII,,, | Performed by: STUDENT IN AN ORGANIZED HEALTH CARE EDUCATION/TRAINING PROGRAM

## 2025-08-25 PROCEDURE — 99215 OFFICE O/P EST HI 40 MIN: CPT | Mod: ,,, | Performed by: STUDENT IN AN ORGANIZED HEALTH CARE EDUCATION/TRAINING PROGRAM

## 2025-08-25 PROCEDURE — 1159F MED LIST DOCD IN RCRD: CPT | Mod: CPTII,,, | Performed by: STUDENT IN AN ORGANIZED HEALTH CARE EDUCATION/TRAINING PROGRAM

## 2025-08-25 PROCEDURE — G2211 COMPLEX E/M VISIT ADD ON: HCPCS | Mod: ,,, | Performed by: STUDENT IN AN ORGANIZED HEALTH CARE EDUCATION/TRAINING PROGRAM

## 2025-09-02 ENCOUNTER — OFFICE VISIT (OUTPATIENT)
Dept: HEMATOLOGY/ONCOLOGY | Facility: CLINIC | Age: 35
End: 2025-09-02
Payer: COMMERCIAL

## 2025-09-02 VITALS
SYSTOLIC BLOOD PRESSURE: 152 MMHG | TEMPERATURE: 98 F | HEART RATE: 59 BPM | BODY MASS INDEX: 21.83 KG/M2 | HEIGHT: 72 IN | OXYGEN SATURATION: 100 % | DIASTOLIC BLOOD PRESSURE: 96 MMHG | WEIGHT: 161.13 LBS | RESPIRATION RATE: 16 BRPM

## 2025-09-02 DIAGNOSIS — C71.9 GRADE IV ASTROCYTOMA: ICD-10-CM

## 2025-09-02 PROCEDURE — 3077F SYST BP >= 140 MM HG: CPT | Mod: CPTII,,, | Performed by: STUDENT IN AN ORGANIZED HEALTH CARE EDUCATION/TRAINING PROGRAM

## 2025-09-02 PROCEDURE — 99215 OFFICE O/P EST HI 40 MIN: CPT | Mod: ,,, | Performed by: STUDENT IN AN ORGANIZED HEALTH CARE EDUCATION/TRAINING PROGRAM

## 2025-09-02 PROCEDURE — G2211 COMPLEX E/M VISIT ADD ON: HCPCS | Mod: ,,, | Performed by: STUDENT IN AN ORGANIZED HEALTH CARE EDUCATION/TRAINING PROGRAM

## 2025-09-02 PROCEDURE — 3008F BODY MASS INDEX DOCD: CPT | Mod: CPTII,,, | Performed by: STUDENT IN AN ORGANIZED HEALTH CARE EDUCATION/TRAINING PROGRAM

## 2025-09-02 PROCEDURE — 1159F MED LIST DOCD IN RCRD: CPT | Mod: CPTII,,, | Performed by: STUDENT IN AN ORGANIZED HEALTH CARE EDUCATION/TRAINING PROGRAM

## 2025-09-02 PROCEDURE — 3080F DIAST BP >= 90 MM HG: CPT | Mod: CPTII,,, | Performed by: STUDENT IN AN ORGANIZED HEALTH CARE EDUCATION/TRAINING PROGRAM

## 2025-09-02 RX ORDER — SODIUM CHLORIDE 0.9 % (FLUSH) 0.9 %
10 SYRINGE (ML) INJECTION
Status: CANCELLED | OUTPATIENT
Start: 2025-09-02

## 2025-09-02 RX ORDER — EPINEPHRINE 0.3 MG/.3ML
0.3 INJECTION SUBCUTANEOUS ONCE AS NEEDED
Status: CANCELLED | OUTPATIENT
Start: 2025-09-02 | End: 2037-01-29

## 2025-09-02 RX ORDER — PROCHLORPERAZINE EDISYLATE 5 MG/ML
10 INJECTION INTRAMUSCULAR; INTRAVENOUS ONCE AS NEEDED
Status: CANCELLED | OUTPATIENT
Start: 2025-09-02

## 2025-09-02 RX ORDER — HEPARIN 100 UNIT/ML
500 SYRINGE INTRAVENOUS
Status: CANCELLED | OUTPATIENT
Start: 2025-09-02

## 2025-09-02 RX ORDER — DIPHENHYDRAMINE HYDROCHLORIDE 50 MG/ML
50 INJECTION, SOLUTION INTRAMUSCULAR; INTRAVENOUS ONCE AS NEEDED
Status: CANCELLED | OUTPATIENT
Start: 2025-09-02 | End: 2037-01-29

## 2025-09-04 ENCOUNTER — TELEPHONE (OUTPATIENT)
Dept: HEMATOLOGY/ONCOLOGY | Facility: CLINIC | Age: 35
End: 2025-09-04
Payer: COMMERCIAL

## (undated) DEVICE — DRAPE FLUID WARMER 44X44IN

## (undated) DEVICE — TOOL MR8 TAPER 7CM 1.1MM

## (undated) DEVICE — TOOL MR8 F2 TAPER 7CM 2.3MM

## (undated) DEVICE — GLOVE PROTEXIS BLUE LATEX 7

## (undated) DEVICE — SPHERE NDI PASSIVE

## (undated) DEVICE — CATH BACTISEAL EVD LG LMN 1.9

## (undated) DEVICE — POSITIONER HEEL FOAM CONVOLTD

## (undated) DEVICE — TRAY CATH FOL SIL URIMTR 16FR

## (undated) DEVICE — DRAIN JP STD PENROSE 1/4X12IN

## (undated) DEVICE — POSITIONER HEAD ADULT

## (undated) DEVICE — MARKER WRITESITE SKIN CHLRAPRP

## (undated) DEVICE — SPONGE CDMN CLLGN BICOL 4X3IN

## (undated) DEVICE — PERFORATOR SURG CRAN 14X11MM

## (undated) DEVICE — GLOVE PROTEXIS BLUE LATEX 6.5

## (undated) DEVICE — ELECTRODE PATIENT RETURN DISP

## (undated) DEVICE — BAND RUBBER STERILE 1/4X3.5IN

## (undated) DEVICE — KIT CRANIAL ACCESS

## (undated) DEVICE — HEMOSTAT SURGICEL FIBRLR 2X4IN

## (undated) DEVICE — SUT VICRYL 2-0 8-18 CP-2

## (undated) DEVICE — TUBING SILICON CLR 3/16IN 10FT

## (undated) DEVICE — SEALANT ADHERUS DURAL AUTOSPRY

## (undated) DEVICE — DRAPE STERI INSTRUMENT 1018

## (undated) DEVICE — TAPE CLOTH MEDIPORE SOFT 2X2YD

## (undated) DEVICE — SYS ACCUDRAIN EXT DRNGE CSF

## (undated) DEVICE — GLOVE PROTEXIS PI SYN SURG 6.0

## (undated) DEVICE — GLOVE PROTEXIS PI SYN SURG 6.5

## (undated) DEVICE — TUBE SUCTION MEDI-VAC STERILE

## (undated) DEVICE — GLOVE PROTEXIS PI SYN SURG 7.5

## (undated) DEVICE — GAUZE SPONGE 4X4 12PLY

## (undated) DEVICE — SUT VICRYL PLUS 4-0 TF 18IN

## (undated) DEVICE — COVER HD BACK TABLE 6FT

## (undated) DEVICE — SEE L#804

## (undated) DEVICE — SEE MEDLINE ITEM 157103

## (undated) DEVICE — BLADE CLIPPER NEURO / MDL 4411

## (undated) DEVICE — DISH PETRI MED 3.5IN

## (undated) DEVICE — GOWN SMARTSLEEVE AAMI LVL4 LG

## (undated) DEVICE — KIT SURGIFLO HEMOSTATIC MATRIX

## (undated) DEVICE — DURAPREP SURG SCRUB 26ML

## (undated) DEVICE — BOWL STERILE LARGE 32OZ

## (undated) DEVICE — BENZOIN TINCTURE 0.66ML

## (undated) DEVICE — PACK VARISPREED BATTERY

## (undated) DEVICE — SET SONAPET TUBING W/EXT

## (undated) DEVICE — DRESSING TRANS 2X2 TEGADERM

## (undated) DEVICE — CONTAINER SPECIMEN SCREW 4OZ

## (undated) DEVICE — SOL .9NACL PF 100 ML

## (undated) DEVICE — DRESSING TELFA N ADH 3X8

## (undated) DEVICE — GLOVE PROTEXIS BLUE LATEX 8

## (undated) DEVICE — SUT VICRYL PLUS 3-0 SH 18IN

## (undated) DEVICE — SPONGE SURGIFOAM 100 8.5X12X10

## (undated) DEVICE — COTTON BALL HIGH ABSRB LARGE

## (undated) DEVICE — Device

## (undated) DEVICE — NDL HYPO 22GX1 1/2 SYR 10ML LL

## (undated) DEVICE — CLIP SCALP SCALPFIX MAGAZINE

## (undated) DEVICE — DRAPE OPMI STERILE